# Patient Record
Sex: MALE | Race: WHITE | HISPANIC OR LATINO | Employment: FULL TIME | URBAN - METROPOLITAN AREA
[De-identification: names, ages, dates, MRNs, and addresses within clinical notes are randomized per-mention and may not be internally consistent; named-entity substitution may affect disease eponyms.]

---

## 2020-08-11 ENCOUNTER — HOSPITAL ENCOUNTER (EMERGENCY)
Facility: MEDICAL CENTER | Age: 45
End: 2020-08-12
Attending: EMERGENCY MEDICINE
Payer: OTHER MISCELLANEOUS

## 2020-08-11 DIAGNOSIS — K42.9 UMBILICAL HERNIA WITHOUT OBSTRUCTION AND WITHOUT GANGRENE: ICD-10-CM

## 2020-08-11 NOTE — LETTER
"  FORM C-4:  EMPLOYEE’S CLAIM FOR COMPENSATION/ REPORT OF INITIAL TREATMENT  EMPLOYEE’S CLAIM - PROVIDE ALL INFORMATION REQUESTED   First Name Herrera Last Name MelgarMorena Cerda Birthdate 1975  Sex male Claim Number   Home Employee Address [address]                                     Zip  [address] Height  1.651 m (5' 5\") Weight  77.1 kg (170 lb) [facility]     Mailing Employee Address [address]               Zip  [address] Telephone  [phone] (home)  Primary Language Spoken  English   Insurer   Third Party   [employer] Employee's Occupation (Job Title) When Injury or Occupational Disease Occurred     Employer's Name [employer] Telephone N/A    Employer Address [address] [place] [address]   Date of Injury  8/10/2020       Hour of Injury  5:15 PM Date Employer Notified  8/11/2020 Last Day of Work after Injury or Occupational Disease  8/11/2020 Supervisor to Whom Injury Reported  Alvaro PRÉEZ   Address or Location of Accident (if applicable) [[address] [place] [address]]   What were you doing at the time of accident? (if applicable) loading a container onto an alter elevator    How did this injury or occupational disease occur? Be specific and answer in detail. Use additional sheet if necessary)  I was loading a container onto an alter elevator when the lanyard on the container ripped  and caused me to fall backwards. I gurt my left shoulder and noticed pain and a bump  by my belly button   If you believe that you have an occupational disease, when did you first have knowledge of the disability and it relationship to your employment? N/A Witnesses to the Accident  2 leads (Jerel/Moises) A few coworkers   Nature of Injury or Occupational Disease  Workers' Compensation Part(s) of Body Injured or Affected  Shoulder (L), Abdomen Including Groin, N/A    I CERTIFY THAT THE ABOVE IS TRUE AND CORRECT TO THE BEST OF MY KNOWLEDGE AND " THAT I HAVE PROVIDED THIS INFORMATION IN ORDER TO OBTAIN THE BENEFITS OF [place]).  I HEREBY AUTHORIZE ANY PHYSICIAN, CHIROPRACTOR, SURGEON, PRACTITIONER, OR OTHER PERSON, ANY HOSPITAL, INCLUDING [facility] OR [facility], ANY MEDICAL SERVICE ORGANIZATION, ANY INSURANCE COMPANY, OR OTHER INSTITUTION OR ORGANIZATION TO RELEASE TO EACH OTHER, ANY MEDICAL OR OTHER INFORMATION, INCLUDING BENEFITS PAID OR PAYABLE, PERTINENT TO THIS INJURY OR DISEASE, EXCEPT INFORMATION RELATIVE TO DIAGNOSIS, TREATMENT AND/OR COUNSELING FOR AIDS, PSYCHOLOGICAL CONDITIONS, ALCOHOL OR CONTROLLED SUBSTANCES, FOR WHICH I MUST GIVE SPECIFIC AUTHORIZATION.  A PHOTOSTAT OF THIS AUTHORIZATION SHALL BE AS VALID AS THE ORIGINAL.  Date 08-      [facility]     Employee’s Signature   THIS REPORT MUST BE COMPLETED AND MAILED WITHIN 3 WORKING DAYS OF TREATMENT   Place [facility], EMERGENCY DEPT                                                                             Name of Facility [facility]   Date  8/11/2020 Diagnosis  (K42.9) Umbilical hernia without obstruction and without gangrene Is there evidence the injured employee was under the influence of alcohol and/or another controlled substance at the time of accident?   Hour  2:07 AM Description of Injury or Disease  Umbilical hernia without obstruction and without gangrene No   Treatment  Reduction of incarcerated umbilical hernia  Have you advised the patient to remain off work five days or more?         No   X-Ray Findings    If Yes   From Date    To Date      From information given by the employee, together with medical evidence, can you directly connect this injury or occupational disease as job incurred? Yes If No, is employee capable of: Full Duty  No Modified Duty  Yes   Is additional  "medical care by a physician indicated? Yes If Modified Duty, Specify any Limitations / Restrictions   No lifting greater than 5 pounds   Do you know of any previous injury or disease contributing to this condition or occupational disease? No    Date 8/12/2020 Print Doctor’s Name Chuy Curiel certify the employer’s copy of this form was mailed on:   Address [address] [place] [address]  [place] [id number] [phone] INSURER’S USE ONLY   Provider’s Tax ID Number [id number] Telephone Dept: [phone]    Doctor’s Signature e-CHUY Cronin M.D. Degree M.D.      Form C-4 (rev.10/07)                                                                         BRIEF DESCRIPTION OF RIGHTS AND BENEFITS  (Pursuant to [place] 616[place].050)    Notice of Injury or Occupational Disease (Incident Report Form C-1): If an injury or occupational disease (OD) arises out of and in the course of employment, you must provide written notice to your employer as soon as practicable, but no later than 7 days after the accident or OD. Your employer shall maintain a sufficient supply of the required forms.    Claim for Compensation (Form C-4): If medical treatment is sought, the form C-4 is available at the place of initial treatment. A completed \"Claim for Compensation\" (Form C-4) must be filed within 90 days after an accident or OD. The treating physician or chiropractor must, within 3 working days after treatment, complete and mail to the employer, the employer's insurer and third-party , the Claim for Compensation.    Medical Treatment: If you require medical treatment for your on-the-job injury or OD, you may be required to select a physician or chiropractor from a list provided by your workers’ compensation insurer, if it has contracted with an Organization for Managed Care (MCO) or Preferred Provider Organization (PPO) or providers of health care. If your employer has not entered into a contract with an MCO or PPO, you may " select a physician or chiropractor from the Panel of Physicians and Chiropractors. Any medical costs related to your industrial injury or OD will be paid by your insurer.    Temporary Total Disability (TTD): If your doctor has certified that you are unable to work for a period of at least 5 consecutive days, or 5 cumulative days in a 20-day period, or places restrictions on you that your employer does not accommodate, you may be entitled to TTD compensation.    Temporary Partial Disability (TPD): If the wage you receive upon reemployment is less than the compensation for TTD to which you are entitled, the insurer may be required to pay you TPD compensation to make up the difference. TPD can only be paid for a maximum of 24 months.    Permanent Partial Disability (PPD): When your medical condition is stable and there is an indication of a PPD as a result of your injury or OD, within 30 days, your insurer must arrange for an evaluation by a rating physician or chiropractor to determine the degree of your PPD. The amount of your PPD award depends on the date of injury, the results of the PPD evaluation and your age and wage.    Permanent Total Disability (PTD): If you are medically certified by a treating physician or chiropractor as permanently and totally disabled and have been granted a PTD status by your insurer, you are entitled to receive monthly benefits not to exceed 66 2/3% of your average monthly wage. The amount of your PTD payments is subject to reduction if you previously received a PPD award.    Vocational Rehabilitation Services: You may be eligible for vocational rehabilitation services if you are unable to return to the job due to a permanent physical impairment or permanent restrictions as a result of your injury or occupational disease.    Transportation and Per Andrei Reimbursement: You may be eligible for travel expenses and per andrei associated with medical treatment.    Reopening: You may be able to  reopen your claim if your condition worsens after claim closure.     Appeal Process: If you disagree with a written determination issued by the insurer or the insurer does not respond to your request, you may appeal to the Department of Administration, , by following the instructions contained in your determination letter. You must appeal the determination within 70 days from the date of the determination letter at [address], [address], [place], [place] [address], or [address], [address], [place], [place] [address]. If you disagree with the  decision, you may appeal to the Department of Administration, . You must file your appeal within 30 days from the date of the  decision letter at [address], [address], [place], [place] [address], or [address], [place], [place] [address]. If you disagree with a decision of an , you may file a petition for judicial review with the District Court. You must do so within 30 days of the Appeal Officer’s decision. You may be represented by an  at your own expense or you may contact the [facility] for possible representation.    [place]  for Injured Workers (NAIW): If you disagree with a  decision, you may request that NAIW represent you without charge at an  Hearing. For information regarding denial of benefits, you may contact the [facility] at: [address], [address], [place] [address], [phone], or [address], [place] [address], [phone]    To File a Complaint with the Division: If you wish to file a complaint with the  of the Division of Industrial Relations (DIR),  please contact the Workers’ Compensation Section, [address], [address], [place], [place] [address], telephone [phone], or [address], [address], [place], [place] [address],  telephone [phone].    For assistance with Workers’ Compensation Issues: You may contact the Office of the Governor Consumer Health Assistance, [address] [place] [address], [address], [place] [address], Toll Free 1-509.477.2899, Web site: http://FONU2.On license of [email or web address]., E-mail [email or web address].On license of [facility].[place].  D-2 (rev. 06/18)              __________________________________________________________________                                    08-            Employee Name / Signature                                                                                                                            Date

## 2020-08-12 VITALS
RESPIRATION RATE: 18 BRPM | DIASTOLIC BLOOD PRESSURE: 94 MMHG | HEIGHT: 65 IN | TEMPERATURE: 97.2 F | WEIGHT: 170 LBS | HEART RATE: 72 BPM | SYSTOLIC BLOOD PRESSURE: 155 MMHG | OXYGEN SATURATION: 94 % | BODY MASS INDEX: 28.32 KG/M2

## 2020-08-12 LAB
ALBUMIN SERPL BCP-MCNC: 4.4 G/DL (ref 3.2–4.9)
ALBUMIN/GLOB SERPL: 1.6 G/DL
ALP SERPL-CCNC: 88 U/L (ref 30–99)
ALT SERPL-CCNC: 25 U/L (ref 2–50)
ANION GAP SERPL CALC-SCNC: 14 MMOL/L (ref 7–16)
AST SERPL-CCNC: 22 U/L (ref 12–45)
BASOPHILS # BLD AUTO: 0.5 % (ref 0–1.8)
BASOPHILS # BLD: 0.05 K/UL (ref 0–0.12)
BILIRUB SERPL-MCNC: 0.2 MG/DL (ref 0.1–1.5)
BUN SERPL-MCNC: 12 MG/DL (ref 8–22)
CALCIUM SERPL-MCNC: 9 MG/DL (ref 8.5–10.5)
CHLORIDE SERPL-SCNC: 101 MMOL/L (ref 96–112)
CO2 SERPL-SCNC: 22 MMOL/L (ref 20–33)
CREAT SERPL-MCNC: 0.81 MG/DL (ref 0.5–1.4)
EOSINOPHIL # BLD AUTO: 0.23 K/UL (ref 0–0.51)
EOSINOPHIL NFR BLD: 2.4 % (ref 0–6.9)
ERYTHROCYTE [DISTWIDTH] IN BLOOD BY AUTOMATED COUNT: 44.3 FL (ref 35.9–50)
GLOBULIN SER CALC-MCNC: 2.8 G/DL (ref 1.9–3.5)
GLUCOSE SERPL-MCNC: 91 MG/DL (ref 65–99)
HCT VFR BLD AUTO: 43.8 % (ref 42–52)
HGB BLD-MCNC: 14.3 G/DL (ref 14–18)
IMM GRANULOCYTES # BLD AUTO: 0.02 K/UL (ref 0–0.11)
IMM GRANULOCYTES NFR BLD AUTO: 0.2 % (ref 0–0.9)
LYMPHOCYTES # BLD AUTO: 3.27 K/UL (ref 1–4.8)
LYMPHOCYTES NFR BLD: 34.1 % (ref 22–41)
MCH RBC QN AUTO: 27.9 PG (ref 27–33)
MCHC RBC AUTO-ENTMCNC: 32.6 G/DL (ref 33.7–35.3)
MCV RBC AUTO: 85.5 FL (ref 81.4–97.8)
MONOCYTES # BLD AUTO: 0.79 K/UL (ref 0–0.85)
MONOCYTES NFR BLD AUTO: 8.2 % (ref 0–13.4)
NEUTROPHILS # BLD AUTO: 5.24 K/UL (ref 1.82–7.42)
NEUTROPHILS NFR BLD: 54.6 % (ref 44–72)
NRBC # BLD AUTO: 0 K/UL
NRBC BLD-RTO: 0 /100 WBC
PLATELET # BLD AUTO: 308 K/UL (ref 164–446)
PMV BLD AUTO: 10.7 FL (ref 9–12.9)
POTASSIUM SERPL-SCNC: 3.6 MMOL/L (ref 3.6–5.5)
PROT SERPL-MCNC: 7.2 G/DL (ref 6–8.2)
RBC # BLD AUTO: 5.12 M/UL (ref 4.7–6.1)
SODIUM SERPL-SCNC: 137 MMOL/L (ref 135–145)
WBC # BLD AUTO: 9.6 K/UL (ref 4.8–10.8)

## 2020-08-12 PROCEDURE — 99284 EMERGENCY DEPT VISIT MOD MDM: CPT

## 2020-08-12 PROCEDURE — 85025 COMPLETE CBC W/AUTO DIFF WBC: CPT

## 2020-08-12 PROCEDURE — 700111 HCHG RX REV CODE 636 W/ 250 OVERRIDE (IP): Performed by: EMERGENCY MEDICINE

## 2020-08-12 PROCEDURE — 80053 COMPREHEN METABOLIC PANEL: CPT

## 2020-08-12 PROCEDURE — 96375 TX/PRO/DX INJ NEW DRUG ADDON: CPT

## 2020-08-12 PROCEDURE — 96374 THER/PROPH/DIAG INJ IV PUSH: CPT

## 2020-08-12 RX ORDER — MIDAZOLAM HYDROCHLORIDE 1 MG/ML
1 INJECTION INTRAMUSCULAR; INTRAVENOUS ONCE
Status: COMPLETED | OUTPATIENT
Start: 2020-08-12 | End: 2020-08-12

## 2020-08-12 RX ADMIN — MIDAZOLAM HYDROCHLORIDE 1 MG: 1 INJECTION, SOLUTION INTRAMUSCULAR; INTRAVENOUS at 00:38

## 2020-08-12 RX ADMIN — FENTANYL CITRATE 100 MCG: 50 INJECTION INTRAMUSCULAR; INTRAVENOUS at 00:38

## 2020-08-12 NOTE — ED TRIAGE NOTES
"Pt feel from standing yest at work, today c/o abd pain and a \"bump by his belly button\", denies n/v, denies HA, denies LOC   "

## 2020-08-12 NOTE — ED PROVIDER NOTES
ED Provider Note    CHIEF COMPLAINT  Chief Complaint   Patient presents with   • Fall   • Work-Related Injury       HPI  Herrera Barros is a 44 y.o. male who presents with abdominal pain.  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  He states he was pulling some cargo and what he is pulling on broke.  He had significant left shoulder pain and since that time he said periumbilical abdominal pain.  He states the pain is worse with any Valsalva type maneuver.  He does not have any associated vomiting.  He has not had any fevers.  He denies change in bowel or bladder function.  He states the pain is worse with pressure in the umbilical region.  States the pain is moderate in intensity.    REVIEW OF SYSTEMS  See HPI for further details. All other systems are negative.     PAST MEDICAL HISTORY  No past medical history on file.    FAMILY HISTORY  [unfilled]    SOCIAL HISTORY  Social History     Socioeconomic History   • Marital status: Single     Spouse name: Not on file   • Number of children: Not on file   • Years of education: Not on file   • Highest education level: Not on file   Occupational History   • Not on file   Social Needs   • Financial resource strain: Not on file   • Food insecurity     Worry: Not on file     Inability: Not on file   • Transportation needs     Medical: Not on file     Non-medical: Not on file   Tobacco Use   • Smoking status: Never Smoker   Substance and Sexual Activity   • Alcohol use: Yes     Comment: occ   • Drug use: No   • Sexual activity: Not on file   Lifestyle   • Physical activity     Days per week: Not on file     Minutes per session: Not on file   • Stress: Not on file   Relationships   • Social connections     Talks on phone: Not on file     Gets together: Not on file     Attends Hinduism service: Not on file     Active member of club or organization: Not on file     Attends meetings of clubs or organizations: Not on file     Relationship  "status: Not on file   • Intimate partner violence     Fear of current or ex partner: Not on file     Emotionally abused: Not on file     Physically abused: Not on file     Forced sexual activity: Not on file   Other Topics Concern   • Not on file   Social History Narrative   • Not on file       SURGICAL HISTORY  No past surgical history on file.    CURRENT MEDICATIONS  Home Medications     Reviewed by Sonal Barriga R.N. (Registered Nurse) on 08/11/20 at 2212  Med List Status: Not Addressed   Medication Last Dose Status        Patient Compa Taking any Medications                       ALLERGIES  No Known Allergies    PHYSICAL EXAM  VITAL SIGNS: BP (!) 169/93   Pulse 90   Temp 36 °C (96.8 °F) (Oral)   Resp 14   Ht 1.651 m (5' 5\")   Wt 77.1 kg (170 lb)   SpO2 97%   BMI 28.29 kg/m²       Constitutional: Well developed, Well nourished, No acute distress, Non-toxic appearance.   HENT: Normocephalic, Atraumatic, Bilateral external ears normal, Oropharynx moist, No oral exudates, Nose normal.   Eyes: PERRLA, EOMI, Conjunctiva normal, No discharge.   Neck: Normal range of motion, No tenderness, Supple, No stridor.   Lymphatic: No lymphadenopathy noted.   Cardiovascular: Normal heart rate, Normal rhythm, No murmurs, No rubs, No gallops.   Thorax & Lungs: Normal breath sounds, No respiratory distress, No wheezing, No chest tenderness.   Abdomen: Incarcerated umbilical hernia, otherwise mild diffuse tenderness, no distention, normal bowel sounds  Skin: Warm, Dry, No erythema, No rash.   Back: No tenderness, No CVA tenderness.   Extremities: Left anterior and lateral shoulder discomfort without obvious deformities.  Full range of motion to the left upper extremity.  Extremities otherwise intact distal pulses, No edema, No tenderness, No cyanosis, No clubbing.   Neurologic: GCS of 15   psychiatric: Affect normal, Judgment normal, Mood normal.     COURSE & MEDICAL DECISION MAKING  Pertinent Labs & Imaging studies reviewed. " (See chart for details)  This is a 44-year-old male who presents the emergency department with an incarcerated umbilical hernia.  I was able to reduce the hernia after administering Versed and fentanyl intravenously.  Subsequently the patient has been observed and has been asymptomatic since that time.  Laboratory analysis is reassuring.  The patient will be discharged with instructions to follow-up with the general surgeon for outpatient surgical intervention.  If he is unable to reduce the hernia manually he will return for repeat examination.  He is instructed to not lift greater than 5 pounds.  As for the left shoulder injury suspect this is more of a strain.  This should respond to anti-inflammatories.    Procedure umbilical hernia reduction  The patient received a milligram of Versed and 100 mcg of fentanyl.  I was able to reduce the umbilical hernia without difficulty.    FINAL IMPRESSION  1.  Incarcerated umbilical hernia  2.  Umbilical hernia reduction  3.  Left shoulder strain    Disposition  The patient will be discharged in stable condition         Electronically signed by: Chuy Curiel M.D., 8/12/2020 12:12 AM

## 2020-09-17 ENCOUNTER — HOSPITAL ENCOUNTER (OUTPATIENT)
Facility: MEDICAL CENTER | Age: 45
End: 2020-09-17
Attending: SURGERY | Admitting: SURGERY
Payer: COMMERCIAL

## 2020-09-25 ENCOUNTER — PRE-ADMISSION TESTING (OUTPATIENT)
Dept: ADMISSIONS | Facility: MEDICAL CENTER | Age: 45
End: 2020-09-25
Attending: SURGERY
Payer: COMMERCIAL

## 2020-09-25 VITALS — BODY MASS INDEX: 32.67 KG/M2 | WEIGHT: 203.26 LBS | HEIGHT: 66 IN

## 2020-09-25 DIAGNOSIS — Z01.812 PRE-OPERATIVE LABORATORY EXAMINATION: ICD-10-CM

## 2020-09-25 LAB
ANION GAP SERPL CALC-SCNC: 12 MMOL/L (ref 7–16)
BUN SERPL-MCNC: 19 MG/DL (ref 8–22)
CALCIUM SERPL-MCNC: 9.4 MG/DL (ref 8.5–10.5)
CHLORIDE SERPL-SCNC: 101 MMOL/L (ref 96–112)
CO2 SERPL-SCNC: 22 MMOL/L (ref 20–33)
COVID ORDER STATUS COVID19: NORMAL
CREAT SERPL-MCNC: 0.95 MG/DL (ref 0.5–1.4)
GLUCOSE SERPL-MCNC: 89 MG/DL (ref 65–99)
POTASSIUM SERPL-SCNC: 4.8 MMOL/L (ref 3.6–5.5)
SARS-COV-2 RNA RESP QL NAA+PROBE: NOTDETECTED
SODIUM SERPL-SCNC: 135 MMOL/L (ref 135–145)
SPECIMEN SOURCE: NORMAL

## 2020-09-25 PROCEDURE — 36415 COLL VENOUS BLD VENIPUNCTURE: CPT

## 2020-09-25 PROCEDURE — C9803 HOPD COVID-19 SPEC COLLECT: HCPCS

## 2020-09-25 PROCEDURE — U0003 INFECTIOUS AGENT DETECTION BY NUCLEIC ACID (DNA OR RNA); SEVERE ACUTE RESPIRATORY SYNDROME CORONAVIRUS 2 (SARS-COV-2) (CORONAVIRUS DISEASE [COVID-19]), AMPLIFIED PROBE TECHNIQUE, MAKING USE OF HIGH THROUGHPUT TECHNOLOGIES AS DESCRIBED BY CMS-2020-01-R: HCPCS

## 2020-09-25 PROCEDURE — 80048 BASIC METABOLIC PNL TOTAL CA: CPT

## 2020-09-25 RX ORDER — M-VIT,TX,IRON,MINS/CALC/FOLIC 27MG-0.4MG
1 TABLET ORAL DAILY
Status: ON HOLD | COMMUNITY
End: 2020-11-03

## 2020-09-25 RX ORDER — LOSARTAN POTASSIUM 50 MG/1
50 TABLET ORAL DAILY
Status: ON HOLD | COMMUNITY
End: 2021-02-01

## 2020-09-25 SDOH — HEALTH STABILITY: MENTAL HEALTH: HOW OFTEN DO YOU HAVE A DRINK CONTAINING ALCOHOL?: 4 OR MORE TIMES A WEEK

## 2020-09-25 ASSESSMENT — FIBROSIS 4 INDEX: FIB4 SCORE: 0.66

## 2020-09-28 ENCOUNTER — APPOINTMENT (OUTPATIENT)
Dept: RADIOLOGY | Facility: MEDICAL CENTER | Age: 45
End: 2020-09-28
Attending: EMERGENCY MEDICINE
Payer: OTHER MISCELLANEOUS

## 2020-09-28 ENCOUNTER — HOSPITAL ENCOUNTER (EMERGENCY)
Facility: MEDICAL CENTER | Age: 45
End: 2020-09-28
Attending: EMERGENCY MEDICINE
Payer: OTHER MISCELLANEOUS

## 2020-09-28 VITALS
WEIGHT: 202.82 LBS | DIASTOLIC BLOOD PRESSURE: 108 MMHG | TEMPERATURE: 97.4 F | OXYGEN SATURATION: 96 % | BODY MASS INDEX: 32.6 KG/M2 | SYSTOLIC BLOOD PRESSURE: 176 MMHG | RESPIRATION RATE: 18 BRPM | HEIGHT: 66 IN | HEART RATE: 57 BPM

## 2020-09-28 DIAGNOSIS — N20.0 KIDNEY STONE: ICD-10-CM

## 2020-09-28 LAB
ALBUMIN SERPL BCP-MCNC: 4.3 G/DL (ref 3.2–4.9)
ALBUMIN/GLOB SERPL: 1.3 G/DL
ALP SERPL-CCNC: 92 U/L (ref 30–99)
ALT SERPL-CCNC: 26 U/L (ref 2–50)
ANION GAP SERPL CALC-SCNC: 12 MMOL/L (ref 7–16)
APPEARANCE UR: CLEAR
AST SERPL-CCNC: 24 U/L (ref 12–45)
BACTERIA #/AREA URNS HPF: ABNORMAL /HPF
BASOPHILS # BLD AUTO: 0.4 % (ref 0–1.8)
BASOPHILS # BLD: 0.06 K/UL (ref 0–0.12)
BILIRUB SERPL-MCNC: 0.2 MG/DL (ref 0.1–1.5)
BILIRUB UR QL STRIP.AUTO: NEGATIVE
BUN SERPL-MCNC: 16 MG/DL (ref 8–22)
CALCIUM SERPL-MCNC: 9.2 MG/DL (ref 8.4–10.2)
CHLORIDE SERPL-SCNC: 105 MMOL/L (ref 96–112)
CO2 SERPL-SCNC: 25 MMOL/L (ref 20–33)
COLOR UR: YELLOW
CREAT SERPL-MCNC: 0.9 MG/DL (ref 0.5–1.4)
EOSINOPHIL # BLD AUTO: 0.05 K/UL (ref 0–0.51)
EOSINOPHIL NFR BLD: 0.4 % (ref 0–6.9)
ERYTHROCYTE [DISTWIDTH] IN BLOOD BY AUTOMATED COUNT: 42.8 FL (ref 35.9–50)
GLOBULIN SER CALC-MCNC: 3.3 G/DL (ref 1.9–3.5)
GLUCOSE SERPL-MCNC: 108 MG/DL (ref 65–99)
GLUCOSE UR STRIP.AUTO-MCNC: NEGATIVE MG/DL
HCT VFR BLD AUTO: 43.9 % (ref 42–52)
HGB BLD-MCNC: 14.5 G/DL (ref 14–18)
IMM GRANULOCYTES # BLD AUTO: 0.06 K/UL (ref 0–0.11)
IMM GRANULOCYTES NFR BLD AUTO: 0.4 % (ref 0–0.9)
KETONES UR STRIP.AUTO-MCNC: NEGATIVE MG/DL
LEUKOCYTE ESTERASE UR QL STRIP.AUTO: NEGATIVE
LIPASE SERPL-CCNC: 32 U/L (ref 7–58)
LYMPHOCYTES # BLD AUTO: 2.31 K/UL (ref 1–4.8)
LYMPHOCYTES NFR BLD: 16.3 % (ref 22–41)
MCH RBC QN AUTO: 27.5 PG (ref 27–33)
MCHC RBC AUTO-ENTMCNC: 33 G/DL (ref 33.7–35.3)
MCV RBC AUTO: 83.1 FL (ref 81.4–97.8)
MICRO URNS: ABNORMAL
MONOCYTES # BLD AUTO: 0.68 K/UL (ref 0–0.85)
MONOCYTES NFR BLD AUTO: 4.8 % (ref 0–13.4)
MUCOUS THREADS #/AREA URNS HPF: ABNORMAL /HPF
NEUTROPHILS # BLD AUTO: 11.03 K/UL (ref 1.82–7.42)
NEUTROPHILS NFR BLD: 77.7 % (ref 44–72)
NITRITE UR QL STRIP.AUTO: NEGATIVE
NRBC # BLD AUTO: 0 K/UL
NRBC BLD-RTO: 0 /100 WBC
PH UR STRIP.AUTO: 6.5 [PH] (ref 5–8)
PLATELET # BLD AUTO: 361 K/UL (ref 164–446)
PMV BLD AUTO: 10.2 FL (ref 9–12.9)
POTASSIUM SERPL-SCNC: 4.2 MMOL/L (ref 3.6–5.5)
PROT SERPL-MCNC: 7.6 G/DL (ref 6–8.2)
PROT UR QL STRIP: NEGATIVE MG/DL
RBC # BLD AUTO: 5.28 M/UL (ref 4.7–6.1)
RBC # URNS HPF: ABNORMAL /HPF
RBC UR QL AUTO: ABNORMAL
SODIUM SERPL-SCNC: 142 MMOL/L (ref 135–145)
SP GR UR STRIP.AUTO: 1.01
WBC # BLD AUTO: 14.2 K/UL (ref 4.8–10.8)
WBC #/AREA URNS HPF: ABNORMAL /HPF

## 2020-09-28 PROCEDURE — 74177 CT ABD & PELVIS W/CONTRAST: CPT

## 2020-09-28 PROCEDURE — 700105 HCHG RX REV CODE 258: Performed by: EMERGENCY MEDICINE

## 2020-09-28 PROCEDURE — 700117 HCHG RX CONTRAST REV CODE 255: Performed by: EMERGENCY MEDICINE

## 2020-09-28 PROCEDURE — 96365 THER/PROPH/DIAG IV INF INIT: CPT

## 2020-09-28 PROCEDURE — 700102 HCHG RX REV CODE 250 W/ 637 OVERRIDE(OP): Performed by: EMERGENCY MEDICINE

## 2020-09-28 PROCEDURE — 87086 URINE CULTURE/COLONY COUNT: CPT

## 2020-09-28 PROCEDURE — A9270 NON-COVERED ITEM OR SERVICE: HCPCS | Performed by: EMERGENCY MEDICINE

## 2020-09-28 PROCEDURE — 99284 EMERGENCY DEPT VISIT MOD MDM: CPT

## 2020-09-28 PROCEDURE — 81001 URINALYSIS AUTO W/SCOPE: CPT

## 2020-09-28 PROCEDURE — 85025 COMPLETE CBC W/AUTO DIFF WBC: CPT

## 2020-09-28 PROCEDURE — 83690 ASSAY OF LIPASE: CPT

## 2020-09-28 PROCEDURE — 700111 HCHG RX REV CODE 636 W/ 250 OVERRIDE (IP): Performed by: EMERGENCY MEDICINE

## 2020-09-28 PROCEDURE — 96375 TX/PRO/DX INJ NEW DRUG ADDON: CPT

## 2020-09-28 PROCEDURE — 80053 COMPREHEN METABOLIC PANEL: CPT

## 2020-09-28 RX ORDER — KETOROLAC TROMETHAMINE 30 MG/ML
15 INJECTION, SOLUTION INTRAMUSCULAR; INTRAVENOUS ONCE
Status: COMPLETED | OUTPATIENT
Start: 2020-09-28 | End: 2020-09-28

## 2020-09-28 RX ORDER — TAMSULOSIN HYDROCHLORIDE 0.4 MG/1
0.4 CAPSULE ORAL DAILY
Qty: 10 CAP | Refills: 0 | Status: ON HOLD | OUTPATIENT
Start: 2020-09-28 | End: 2021-02-01

## 2020-09-28 RX ORDER — TAMSULOSIN HYDROCHLORIDE 0.4 MG/1
0.4 CAPSULE ORAL ONCE
Status: COMPLETED | OUTPATIENT
Start: 2020-09-28 | End: 2020-09-28

## 2020-09-28 RX ORDER — HYDROCODONE BITARTRATE AND ACETAMINOPHEN 5; 325 MG/1; MG/1
1-2 TABLET ORAL EVERY 4 HOURS PRN
Qty: 15 TAB | Refills: 0 | Status: SHIPPED | OUTPATIENT
Start: 2020-09-28 | End: 2020-10-01

## 2020-09-28 RX ORDER — HYDROCODONE BITARTRATE AND ACETAMINOPHEN 5; 325 MG/1; MG/1
2 TABLET ORAL ONCE
Status: COMPLETED | OUTPATIENT
Start: 2020-09-28 | End: 2020-09-28

## 2020-09-28 RX ORDER — ONDANSETRON 4 MG/1
4 TABLET, ORALLY DISINTEGRATING ORAL EVERY 6 HOURS PRN
Qty: 10 TAB | Refills: 0 | Status: ON HOLD | OUTPATIENT
Start: 2020-09-28 | End: 2021-02-01

## 2020-09-28 RX ORDER — SULFAMETHOXAZOLE AND TRIMETHOPRIM 800; 160 MG/1; MG/1
1 TABLET ORAL 2 TIMES DAILY
Qty: 14 TAB | Refills: 0 | Status: SHIPPED | OUTPATIENT
Start: 2020-09-28 | End: 2020-10-05

## 2020-09-28 RX ADMIN — KETOROLAC TROMETHAMINE 15 MG: 30 INJECTION, SOLUTION INTRAMUSCULAR at 21:15

## 2020-09-28 RX ADMIN — TAMSULOSIN HYDROCHLORIDE 0.4 MG: 0.4 CAPSULE ORAL at 20:58

## 2020-09-28 RX ADMIN — CEFTRIAXONE SODIUM 2 G: 2 INJECTION, POWDER, FOR SOLUTION INTRAMUSCULAR; INTRAVENOUS at 22:20

## 2020-09-28 RX ADMIN — HYDROCODONE BITARTRATE AND ACETAMINOPHEN 2 TABLET: 5; 325 TABLET ORAL at 22:12

## 2020-09-28 RX ADMIN — IOHEXOL 100 ML: 350 INJECTION, SOLUTION INTRAVENOUS at 20:27

## 2020-09-28 ASSESSMENT — FIBROSIS 4 INDEX: FIB4 SCORE: 0.66

## 2020-09-29 RX ORDER — SODIUM CHLORIDE, SODIUM LACTATE, POTASSIUM CHLORIDE, CALCIUM CHLORIDE 600; 310; 30; 20 MG/100ML; MG/100ML; MG/100ML; MG/100ML
INJECTION, SOLUTION INTRAVENOUS CONTINUOUS
Status: CANCELLED | OUTPATIENT
Start: 2020-09-29 | End: 2020-09-29

## 2020-09-29 NOTE — ED TRIAGE NOTES
"Presents with a hx of umbilical hernia diagnosed this past August, scheduled for surgery tomorrow. He C/O exacerbating pain.   Chief Complaint   Patient presents with   • Hernia     /100   Pulse 78   Temp 36.3 °C (97.4 °F) (Temporal)   Resp 18   Ht 1.676 m (5' 6\")   Wt 92 kg (202 lb 13.2 oz)   SpO2 96%   BMI 32.74 kg/m²     "

## 2020-09-29 NOTE — ED NOTES
Spoke with ERP about Toradol as pt is due for surgery on his hernia tomorrow. Per ERP given Flomax and hold Toradol at this time.    Pt aware of plan and states that at this time he does not need pain medication.

## 2020-09-29 NOTE — ED PROVIDER NOTES
ED Provider Note    CHIEF COMPLAINT  Chief Complaint   Patient presents with   • Hernia       HPI  Herrera Barros is a 44 y.o. male who presents for evaluation of crampy left lower quadrant abdominal pain.  The patient apparently is scheduled for elective repair of an umbilical hernia tomorrow with Dr. Hall.  This was scheduled weeks ago.  He denies any sharp pain or discomfort or swelling around his umbilicus.  He reports dull crampy intermittent left lower quadrant pain however.  He denies hematochezia melena or hemoptysis.  No history of kidney stones no reported flank pain dysuria or hematuria.  Pain started earlier today.  It does not radiate.  REVIEW OF SYSTEMS  See HPI for further details.  No high fevers chills night sweats weight loss all other systems are negative.     PAST MEDICAL HISTORY  Past Medical History:   Diagnosis Date   • Hypertension        FAMILY HISTORY  Noncontributory    SOCIAL HISTORY  Social History     Socioeconomic History   • Marital status: Single     Spouse name: Not on file   • Number of children: Not on file   • Years of education: Not on file   • Highest education level: Not on file   Occupational History   • Not on file   Social Needs   • Financial resource strain: Not on file   • Food insecurity     Worry: Not on file     Inability: Not on file   • Transportation needs     Medical: Not on file     Non-medical: Not on file   Tobacco Use   • Smoking status: Never Smoker   • Smokeless tobacco: Never Used   Substance and Sexual Activity   • Alcohol use: Yes     Frequency: 4 or more times a week     Comment: Occasionally   • Drug use: No   • Sexual activity: Not on file   Lifestyle   • Physical activity     Days per week: Not on file     Minutes per session: Not on file   • Stress: Not on file   Relationships   • Social connections     Talks on phone: Not on file     Gets together: Not on file     Attends Christian service: Not on file     Active member of club or  "organization: Not on file     Attends meetings of clubs or organizations: Not on file     Relationship status: Not on file   • Intimate partner violence     Fear of current or ex partner: Not on file     Emotionally abused: Not on file     Physically abused: Not on file     Forced sexual activity: Not on file   Other Topics Concern   • Not on file   Social History Narrative   • Not on file     Denies IV drugs  SURGICAL HISTORY  Past Surgical History:   Procedure Laterality Date   • OTHER ORTHOPEDIC SURGERY Left 2001    knee tendon       CURRENT MEDICATIONS  No current facility-administered medications for this encounter.     Current Outpatient Medications:   •  losartan (COZAAR) 50 MG Tab, Take 50 mg by mouth every day., Disp: , Rfl:   •  therapeutic multivitamin-minerals (THERAGRAN-M) Tab, Take 1 Tab by mouth every day., Disp: , Rfl:       ALLERGIES  No Known Allergies    PHYSICAL EXAM  VITAL SIGNS: /100   Pulse 78   Temp 36.3 °C (97.4 °F) (Temporal)   Resp 18   Ht 1.676 m (5' 6\")   Wt 92 kg (202 lb 13.2 oz)   SpO2 96%   BMI 32.74 kg/m²       Constitutional: Well developed, Well nourished, No acute distress, Non-toxic appearance.   HENT: Normocephalic, Atraumatic, Bilateral external ears normal, Oropharynx moist, No oral exudates, Nose normal.   Eyes: PERRLA, EOMI, Conjunctiva normal, No discharge.   Neck: Normal range of motion, No tenderness, Supple, No stridor.   Cardiovascular: Normal heart rate, Normal rhythm, No murmurs, No rubs, No gallops.   Thorax & Lungs: Normal breath sounds, No respiratory distress, No wheezing, No chest tenderness.   Abdomen: Bowel sounds normal, patient has a palpable umbilical hernia that is nontender and easily reducible, he does however have reproducible tenderness in the left lower quadrant with localized guarding he also has some right lower quadrant tenderness as well  Skin: Warm, Dry, No erythema, No rash.   Back: No tenderness, No CVA tenderness.   Extremities: " Intact distal pulses, No edema, No tenderness, No cyanosis, No clubbing.   Neurologic: Alert & oriented x 3, Normal motor function, Normal sensory function, No focal deficits noted.   Psychiatric: Anxious    COURSE & MEDICAL DECISION MAKING  Pertinent Labs & Imaging studies reviewed. (See chart for details)  CT-ABDOMEN-PELVIS WITH   Final Result         1.  7.2 mm distal right ureteral stone results in left urinary outflow tract obstructive changes and changes of obstructive nephropathy.   2.  Right nephrolithiasis without visualized obstructive changes.   3.  Small fat-containing inguinal hernia   4.  Hepatomegaly        Results for orders placed or performed during the hospital encounter of 09/28/20   URINALYSIS    Specimen: Urine   Result Value Ref Range    Color Yellow     Character Clear     Specific Gravity 1.015 <1.035    Ph 6.5 5.0 - 8.0    Glucose Negative Negative mg/dL    Ketones Negative Negative mg/dL    Protein Negative Negative mg/dL    Bilirubin Negative Negative    Nitrite Negative Negative    Leukocyte Esterase Negative Negative    Occult Blood Small (A) Negative    Micro Urine Req Microscopic    CBC WITH DIFFERENTIAL   Result Value Ref Range    WBC 14.2 (H) 4.8 - 10.8 K/uL    RBC 5.28 4.70 - 6.10 M/uL    Hemoglobin 14.5 14.0 - 18.0 g/dL    Hematocrit 43.9 42.0 - 52.0 %    MCV 83.1 81.4 - 97.8 fL    MCH 27.5 27.0 - 33.0 pg    MCHC 33.0 (L) 33.7 - 35.3 g/dL    RDW 42.8 35.9 - 50.0 fL    Platelet Count 361 164 - 446 K/uL    MPV 10.2 9.0 - 12.9 fL    Neutrophils-Polys 77.70 (H) 44.00 - 72.00 %    Lymphocytes 16.30 (L) 22.00 - 41.00 %    Monocytes 4.80 0.00 - 13.40 %    Eosinophils 0.40 0.00 - 6.90 %    Basophils 0.40 0.00 - 1.80 %    Immature Granulocytes 0.40 0.00 - 0.90 %    Nucleated RBC 0.00 /100 WBC    Neutrophils (Absolute) 11.03 (H) 1.82 - 7.42 K/uL    Lymphs (Absolute) 2.31 1.00 - 4.80 K/uL    Monos (Absolute) 0.68 0.00 - 0.85 K/uL    Eos (Absolute) 0.05 0.00 - 0.51 K/uL    Baso (Absolute)  0.06 0.00 - 0.12 K/uL    Immature Granulocytes (abs) 0.06 0.00 - 0.11 K/uL    NRBC (Absolute) 0.00 K/uL   Comp Metabolic Panel   Result Value Ref Range    Sodium 142 135 - 145 mmol/L    Potassium 4.2 3.6 - 5.5 mmol/L    Chloride 105 96 - 112 mmol/L    Co2 25 20 - 33 mmol/L    Anion Gap 12.0 7.0 - 16.0    Glucose 108 (H) 65 - 99 mg/dL    Bun 16 8 - 22 mg/dL    Creatinine 0.90 0.50 - 1.40 mg/dL    Calcium 9.2 8.4 - 10.2 mg/dL    AST(SGOT) 24 12 - 45 U/L    ALT(SGPT) 26 2 - 50 U/L    Alkaline Phosphatase 92 30 - 99 U/L    Total Bilirubin 0.2 0.1 - 1.5 mg/dL    Albumin 4.3 3.2 - 4.9 g/dL    Total Protein 7.6 6.0 - 8.2 g/dL    Globulin 3.3 1.9 - 3.5 g/dL    A-G Ratio 1.3 g/dL   LIPASE   Result Value Ref Range    Lipase 32 7 - 58 U/L   URINE MICROSCOPIC (W/UA)   Result Value Ref Range    WBC 0-2 (A) /hpf    RBC 0-2 (A) /hpf    Bacteria Rare (A) None /hpf    Mucous Threads Few /hpf   ESTIMATED GFR   Result Value Ref Range    GFR If African American >60 >60 mL/min/1.73 m 2    GFR If Non African American >60 >60 mL/min/1.73 m 2      An IV was established.  Patient did not have severe pain.  I reviewed his history.  He is apparently scheduled for an umbilical hernia repair electively tomorrow with Dr. Hall.  His pain here does not seem to originate from his umbilicus.  He has some crampy both left and lower abdominal pain.  Therefore CT scan was performed.  He does apparently have a rather large distal 7 mm right-sided stone, with oddly some obstructive uropathy radiological signs on the left.  I reviewed this case with Dr. Villgeas urology.  He said sometimes you can see that or could be incidental finding or not germane.  He does agree that this is quite a large stone.  The patient does not have pyuria or obvious urinary tract infection.  He has mild leukocytosis which is likely stress response.  We will culture his urine given dose of Rocephin.  I reviewed the issue with Dr. Hall who indicated that he will likely cancel the  elective umbilical hernia repair until the kidney stone can get sorted out.  Urgent referral to urology tomorrow.  I will place him on Bactrim, Norco and Zofran and advised him to drink plenty of fluids    FINAL IMPRESSION  1.  Right-sided 7 mm distal ureteral kidney stone      Electronically signed by: Baljinder Logan M.D., 9/28/2020 6:32 PM

## 2020-09-29 NOTE — ED NOTES
ABX completed. Pt educated on discharge instructions and prescriptions. Pt declines any questions at this time. Pt ambulated out of ER.

## 2020-09-29 NOTE — DISCHARGE INSTRUCTIONS
Drink plenty of clear fluids.  Return immediately for worsening pain fevers or chills.  Follow-up with urology within 1 to 2 days as discussed

## 2020-10-01 LAB
BACTERIA UR CULT: NORMAL
SIGNIFICANT IND 70042: NORMAL
SITE SITE: NORMAL
SOURCE SOURCE: NORMAL

## 2020-10-11 ENCOUNTER — HOSPITAL ENCOUNTER (OUTPATIENT)
Facility: MEDICAL CENTER | Age: 45
End: 2020-10-12
Attending: EMERGENCY MEDICINE | Admitting: HOSPITALIST
Payer: OTHER MISCELLANEOUS

## 2020-10-11 ENCOUNTER — APPOINTMENT (OUTPATIENT)
Dept: RADIOLOGY | Facility: MEDICAL CENTER | Age: 45
End: 2020-10-11
Attending: EMERGENCY MEDICINE
Payer: OTHER MISCELLANEOUS

## 2020-10-11 DIAGNOSIS — N23 RENAL COLIC ON LEFT SIDE: ICD-10-CM

## 2020-10-11 DIAGNOSIS — N20.0 NEPHROLITHIASIS: Primary | ICD-10-CM

## 2020-10-11 DIAGNOSIS — E86.0 DEHYDRATION: ICD-10-CM

## 2020-10-11 DIAGNOSIS — N20.1 URETEROLITHIASIS: ICD-10-CM

## 2020-10-11 LAB
ALBUMIN SERPL BCP-MCNC: 4.5 G/DL (ref 3.2–4.9)
ALBUMIN/GLOB SERPL: 1.4 G/DL
ALP SERPL-CCNC: 95 U/L (ref 30–99)
ALT SERPL-CCNC: 25 U/L (ref 2–50)
ANION GAP SERPL CALC-SCNC: 14 MMOL/L (ref 7–16)
APPEARANCE UR: CLEAR
AST SERPL-CCNC: 24 U/L (ref 12–45)
BACTERIA #/AREA URNS HPF: ABNORMAL /HPF
BASOPHILS # BLD AUTO: 0.4 % (ref 0–1.8)
BASOPHILS # BLD: 0.07 K/UL (ref 0–0.12)
BILIRUB SERPL-MCNC: <0.2 MG/DL (ref 0.1–1.5)
BILIRUB UR QL STRIP.AUTO: NEGATIVE
BUN SERPL-MCNC: 17 MG/DL (ref 8–22)
CALCIUM SERPL-MCNC: 8.7 MG/DL (ref 8.4–10.2)
CHLORIDE SERPL-SCNC: 103 MMOL/L (ref 96–112)
CO2 SERPL-SCNC: 21 MMOL/L (ref 20–33)
COLOR UR: YELLOW
CREAT SERPL-MCNC: 1.16 MG/DL (ref 0.5–1.4)
EOSINOPHIL # BLD AUTO: 0.18 K/UL (ref 0–0.51)
EOSINOPHIL NFR BLD: 1 % (ref 0–6.9)
EPI CELLS #/AREA URNS HPF: NEGATIVE /HPF
ERYTHROCYTE [DISTWIDTH] IN BLOOD BY AUTOMATED COUNT: 44.3 FL (ref 35.9–50)
GLOBULIN SER CALC-MCNC: 3.2 G/DL (ref 1.9–3.5)
GLUCOSE SERPL-MCNC: 165 MG/DL (ref 65–99)
GLUCOSE UR STRIP.AUTO-MCNC: NEGATIVE MG/DL
HCT VFR BLD AUTO: 44 % (ref 42–52)
HGB BLD-MCNC: 14.5 G/DL (ref 14–18)
IMM GRANULOCYTES # BLD AUTO: 0.11 K/UL (ref 0–0.11)
IMM GRANULOCYTES NFR BLD AUTO: 0.6 % (ref 0–0.9)
KETONES UR STRIP.AUTO-MCNC: NEGATIVE MG/DL
LEUKOCYTE ESTERASE UR QL STRIP.AUTO: NEGATIVE
LIPASE SERPL-CCNC: 53 U/L (ref 7–58)
LYMPHOCYTES # BLD AUTO: 2.92 K/UL (ref 1–4.8)
LYMPHOCYTES NFR BLD: 17 % (ref 22–41)
MCH RBC QN AUTO: 27.7 PG (ref 27–33)
MCHC RBC AUTO-ENTMCNC: 33 G/DL (ref 33.7–35.3)
MCV RBC AUTO: 84.1 FL (ref 81.4–97.8)
MICRO URNS: ABNORMAL
MONOCYTES # BLD AUTO: 1.22 K/UL (ref 0–0.85)
MONOCYTES NFR BLD AUTO: 7.1 % (ref 0–13.4)
MUCOUS THREADS #/AREA URNS HPF: ABNORMAL /HPF
NEUTROPHILS # BLD AUTO: 12.67 K/UL (ref 1.82–7.42)
NEUTROPHILS NFR BLD: 73.9 % (ref 44–72)
NITRITE UR QL STRIP.AUTO: NEGATIVE
NRBC # BLD AUTO: 0 K/UL
NRBC BLD-RTO: 0 /100 WBC
PH UR STRIP.AUTO: 6 [PH] (ref 5–8)
PLATELET # BLD AUTO: 346 K/UL (ref 164–446)
PMV BLD AUTO: 10.5 FL (ref 9–12.9)
POTASSIUM SERPL-SCNC: 3.8 MMOL/L (ref 3.6–5.5)
PROT SERPL-MCNC: 7.7 G/DL (ref 6–8.2)
PROT UR QL STRIP: NEGATIVE MG/DL
RBC # BLD AUTO: 5.23 M/UL (ref 4.7–6.1)
RBC # URNS HPF: ABNORMAL /HPF
RBC UR QL AUTO: ABNORMAL
SODIUM SERPL-SCNC: 138 MMOL/L (ref 135–145)
SP GR UR STRIP.AUTO: 1.02
WBC # BLD AUTO: 17.2 K/UL (ref 4.8–10.8)
WBC #/AREA URNS HPF: ABNORMAL /HPF

## 2020-10-11 PROCEDURE — C9803 HOPD COVID-19 SPEC COLLECT: HCPCS | Performed by: EMERGENCY MEDICINE

## 2020-10-11 PROCEDURE — 36415 COLL VENOUS BLD VENIPUNCTURE: CPT

## 2020-10-11 PROCEDURE — 96375 TX/PRO/DX INJ NEW DRUG ADDON: CPT

## 2020-10-11 PROCEDURE — 83690 ASSAY OF LIPASE: CPT

## 2020-10-11 PROCEDURE — 74176 CT ABD & PELVIS W/O CONTRAST: CPT

## 2020-10-11 PROCEDURE — 700105 HCHG RX REV CODE 258: Performed by: EMERGENCY MEDICINE

## 2020-10-11 PROCEDURE — 700111 HCHG RX REV CODE 636 W/ 250 OVERRIDE (IP): Performed by: EMERGENCY MEDICINE

## 2020-10-11 PROCEDURE — 85025 COMPLETE CBC W/AUTO DIFF WBC: CPT

## 2020-10-11 PROCEDURE — 96374 THER/PROPH/DIAG INJ IV PUSH: CPT

## 2020-10-11 PROCEDURE — 80053 COMPREHEN METABOLIC PANEL: CPT

## 2020-10-11 PROCEDURE — 81001 URINALYSIS AUTO W/SCOPE: CPT

## 2020-10-11 PROCEDURE — 99285 EMERGENCY DEPT VISIT HI MDM: CPT

## 2020-10-11 RX ORDER — TAMSULOSIN HYDROCHLORIDE 0.4 MG/1
0.4 CAPSULE ORAL ONCE
Status: COMPLETED | OUTPATIENT
Start: 2020-10-12 | End: 2020-10-12

## 2020-10-11 RX ORDER — SODIUM CHLORIDE 9 MG/ML
1000 INJECTION, SOLUTION INTRAVENOUS ONCE
Status: COMPLETED | OUTPATIENT
Start: 2020-10-11 | End: 2020-10-11

## 2020-10-11 RX ORDER — KETOROLAC TROMETHAMINE 30 MG/ML
15 INJECTION, SOLUTION INTRAMUSCULAR; INTRAVENOUS ONCE
Status: COMPLETED | OUTPATIENT
Start: 2020-10-11 | End: 2020-10-12

## 2020-10-11 RX ORDER — HYDROMORPHONE HYDROCHLORIDE 1 MG/ML
1 INJECTION, SOLUTION INTRAMUSCULAR; INTRAVENOUS; SUBCUTANEOUS ONCE
Status: COMPLETED | OUTPATIENT
Start: 2020-10-11 | End: 2020-10-11

## 2020-10-11 RX ORDER — ONDANSETRON 2 MG/ML
4 INJECTION INTRAMUSCULAR; INTRAVENOUS ONCE
Status: COMPLETED | OUTPATIENT
Start: 2020-10-11 | End: 2020-10-11

## 2020-10-11 RX ADMIN — HYDROMORPHONE HYDROCHLORIDE 1 MG: 1 INJECTION, SOLUTION INTRAMUSCULAR; INTRAVENOUS; SUBCUTANEOUS at 22:54

## 2020-10-11 RX ADMIN — SODIUM CHLORIDE 1000 ML: 9 INJECTION, SOLUTION INTRAVENOUS at 22:53

## 2020-10-11 RX ADMIN — ONDANSETRON 4 MG: 2 INJECTION INTRAMUSCULAR; INTRAVENOUS at 22:54

## 2020-10-11 ASSESSMENT — PAIN DESCRIPTION - DESCRIPTORS: DESCRIPTORS: CRAMPING

## 2020-10-11 ASSESSMENT — FIBROSIS 4 INDEX: FIB4 SCORE: 0.6

## 2020-10-12 ENCOUNTER — ANESTHESIA (OUTPATIENT)
Dept: SURGERY | Facility: MEDICAL CENTER | Age: 45
End: 2020-10-12
Payer: OTHER MISCELLANEOUS

## 2020-10-12 ENCOUNTER — ANESTHESIA EVENT (OUTPATIENT)
Dept: SURGERY | Facility: MEDICAL CENTER | Age: 45
End: 2020-10-12
Payer: OTHER MISCELLANEOUS

## 2020-10-12 ENCOUNTER — APPOINTMENT (OUTPATIENT)
Dept: RADIOLOGY | Facility: MEDICAL CENTER | Age: 45
End: 2020-10-12
Attending: UROLOGY
Payer: OTHER MISCELLANEOUS

## 2020-10-12 VITALS
SYSTOLIC BLOOD PRESSURE: 162 MMHG | RESPIRATION RATE: 18 BRPM | OXYGEN SATURATION: 96 % | TEMPERATURE: 98.7 F | WEIGHT: 205.03 LBS | BODY MASS INDEX: 32.95 KG/M2 | HEIGHT: 66 IN | HEART RATE: 82 BPM | DIASTOLIC BLOOD PRESSURE: 96 MMHG

## 2020-10-12 PROBLEM — I10 HTN (HYPERTENSION): Status: ACTIVE | Noted: 2020-10-12

## 2020-10-12 PROBLEM — N28.9 RENAL INSUFFICIENCY: Status: ACTIVE | Noted: 2020-10-12

## 2020-10-12 PROBLEM — D72.829 LEUKOCYTOSIS: Status: ACTIVE | Noted: 2020-10-12

## 2020-10-12 PROBLEM — Z01.810 PREOPERATIVE CARDIOVASCULAR EXAMINATION: Status: ACTIVE | Noted: 2020-10-12

## 2020-10-12 PROBLEM — N20.0 NEPHROLITHIASIS: Status: ACTIVE | Noted: 2020-10-12

## 2020-10-12 PROBLEM — R73.09 ELEVATED GLUCOSE: Status: ACTIVE | Noted: 2020-10-12

## 2020-10-12 LAB
COVID ORDER STATUS COVID19: NORMAL
PATHOLOGY CONSULT NOTE: NORMAL
SARS-COV-2 RNA RESP QL NAA+PROBE: NOTDETECTED
SPECIMEN SOURCE: NORMAL

## 2020-10-12 PROCEDURE — G0378 HOSPITAL OBSERVATION PER HR: HCPCS

## 2020-10-12 PROCEDURE — 160009 HCHG ANES TIME/MIN: Performed by: UROLOGY

## 2020-10-12 PROCEDURE — 82365 CALCULUS SPECTROSCOPY: CPT

## 2020-10-12 PROCEDURE — C1758 CATHETER, URETERAL: HCPCS | Performed by: UROLOGY

## 2020-10-12 PROCEDURE — 700102 HCHG RX REV CODE 250 W/ 637 OVERRIDE(OP): Performed by: EMERGENCY MEDICINE

## 2020-10-12 PROCEDURE — 501329 HCHG SET, CYSTO IRRIG Y TUR: Performed by: UROLOGY

## 2020-10-12 PROCEDURE — 500062 HCHG BASKET: Performed by: UROLOGY

## 2020-10-12 PROCEDURE — C2617 STENT, NON-COR, TEM W/O DEL: HCPCS | Performed by: UROLOGY

## 2020-10-12 PROCEDURE — 160035 HCHG PACU - 1ST 60 MINS PHASE I: Performed by: UROLOGY

## 2020-10-12 PROCEDURE — 160048 HCHG OR STATISTICAL LEVEL 1-5: Performed by: UROLOGY

## 2020-10-12 PROCEDURE — 700105 HCHG RX REV CODE 258: Performed by: UROLOGY

## 2020-10-12 PROCEDURE — 500879 HCHG PACK, CYSTO: Performed by: UROLOGY

## 2020-10-12 PROCEDURE — 501838 HCHG SUTURE GENERAL: Performed by: UROLOGY

## 2020-10-12 PROCEDURE — 88300 SURGICAL PATH GROSS: CPT

## 2020-10-12 PROCEDURE — 700111 HCHG RX REV CODE 636 W/ 250 OVERRIDE (IP): Performed by: EMERGENCY MEDICINE

## 2020-10-12 PROCEDURE — 96375 TX/PRO/DX INJ NEW DRUG ADDON: CPT

## 2020-10-12 PROCEDURE — 160039 HCHG SURGERY MINUTES - EA ADDL 1 MIN LEVEL 3: Performed by: UROLOGY

## 2020-10-12 PROCEDURE — 700117 HCHG RX CONTRAST REV CODE 255: Performed by: UROLOGY

## 2020-10-12 PROCEDURE — U0003 INFECTIOUS AGENT DETECTION BY NUCLEIC ACID (DNA OR RNA); SEVERE ACUTE RESPIRATORY SYNDROME CORONAVIRUS 2 (SARS-COV-2) (CORONAVIRUS DISEASE [COVID-19]), AMPLIFIED PROBE TECHNIQUE, MAKING USE OF HIGH THROUGHPUT TECHNOLOGIES AS DESCRIBED BY CMS-2020-01-R: HCPCS

## 2020-10-12 PROCEDURE — C1894 INTRO/SHEATH, NON-LASER: HCPCS | Performed by: UROLOGY

## 2020-10-12 PROCEDURE — 160002 HCHG RECOVERY MINUTES (STAT): Performed by: UROLOGY

## 2020-10-12 PROCEDURE — A9270 NON-COVERED ITEM OR SERVICE: HCPCS | Performed by: EMERGENCY MEDICINE

## 2020-10-12 PROCEDURE — C1769 GUIDE WIRE: HCPCS | Performed by: UROLOGY

## 2020-10-12 PROCEDURE — 160028 HCHG SURGERY MINUTES - 1ST 30 MINS LEVEL 3: Performed by: UROLOGY

## 2020-10-12 PROCEDURE — 99220 PR INITIAL OBSERVATION CARE,LEVL III: CPT | Performed by: HOSPITALIST

## 2020-10-12 PROCEDURE — 700101 HCHG RX REV CODE 250: Performed by: UROLOGY

## 2020-10-12 PROCEDURE — 700111 HCHG RX REV CODE 636 W/ 250 OVERRIDE (IP): Performed by: ANESTHESIOLOGY

## 2020-10-12 PROCEDURE — 700101 HCHG RX REV CODE 250: Performed by: ANESTHESIOLOGY

## 2020-10-12 DEVICE — STENT UROLOGICAL POLARIS 6X26  ULTRA: Type: IMPLANTABLE DEVICE | Site: URETER | Status: FUNCTIONAL

## 2020-10-12 RX ORDER — HYDROMORPHONE HYDROCHLORIDE 1 MG/ML
0.1 INJECTION, SOLUTION INTRAMUSCULAR; INTRAVENOUS; SUBCUTANEOUS
Status: DISCONTINUED | OUTPATIENT
Start: 2020-10-12 | End: 2020-10-12 | Stop reason: HOSPADM

## 2020-10-12 RX ORDER — MORPHINE SULFATE 4 MG/ML
2 INJECTION, SOLUTION INTRAMUSCULAR; INTRAVENOUS
Status: DISCONTINUED | OUTPATIENT
Start: 2020-10-12 | End: 2020-10-12 | Stop reason: HOSPADM

## 2020-10-12 RX ORDER — CEFAZOLIN SODIUM 1 G/3ML
INJECTION, POWDER, FOR SOLUTION INTRAMUSCULAR; INTRAVENOUS PRN
Status: DISCONTINUED | OUTPATIENT
Start: 2020-10-12 | End: 2020-10-12 | Stop reason: SURG

## 2020-10-12 RX ORDER — MEPERIDINE HYDROCHLORIDE 25 MG/ML
6.25 INJECTION INTRAMUSCULAR; INTRAVENOUS; SUBCUTANEOUS
Status: DISCONTINUED | OUTPATIENT
Start: 2020-10-12 | End: 2020-10-12 | Stop reason: HOSPADM

## 2020-10-12 RX ORDER — SODIUM CHLORIDE, SODIUM LACTATE, POTASSIUM CHLORIDE, CALCIUM CHLORIDE 600; 310; 30; 20 MG/100ML; MG/100ML; MG/100ML; MG/100ML
INJECTION, SOLUTION INTRAVENOUS CONTINUOUS
Status: DISCONTINUED | OUTPATIENT
Start: 2020-10-12 | End: 2020-10-12 | Stop reason: HOSPADM

## 2020-10-12 RX ORDER — LOSARTAN POTASSIUM 25 MG/1
50 TABLET ORAL DAILY
Status: DISCONTINUED | OUTPATIENT
Start: 2020-10-12 | End: 2020-10-12 | Stop reason: HOSPADM

## 2020-10-12 RX ORDER — KETOROLAC TROMETHAMINE 30 MG/ML
INJECTION, SOLUTION INTRAMUSCULAR; INTRAVENOUS PRN
Status: DISCONTINUED | OUTPATIENT
Start: 2020-10-12 | End: 2020-10-12 | Stop reason: SURG

## 2020-10-12 RX ORDER — OXYCODONE HCL 5 MG/5 ML
10 SOLUTION, ORAL ORAL
Status: DISCONTINUED | OUTPATIENT
Start: 2020-10-12 | End: 2020-10-12 | Stop reason: HOSPADM

## 2020-10-12 RX ORDER — HYDROMORPHONE HYDROCHLORIDE 1 MG/ML
0.2 INJECTION, SOLUTION INTRAMUSCULAR; INTRAVENOUS; SUBCUTANEOUS
Status: DISCONTINUED | OUTPATIENT
Start: 2020-10-12 | End: 2020-10-12 | Stop reason: HOSPADM

## 2020-10-12 RX ORDER — BISACODYL 10 MG
10 SUPPOSITORY, RECTAL RECTAL
Status: DISCONTINUED | OUTPATIENT
Start: 2020-10-12 | End: 2020-10-12 | Stop reason: HOSPADM

## 2020-10-12 RX ORDER — PROMETHAZINE HYDROCHLORIDE 25 MG/1
12.5-25 TABLET ORAL EVERY 4 HOURS PRN
Status: DISCONTINUED | OUTPATIENT
Start: 2020-10-12 | End: 2020-10-12 | Stop reason: HOSPADM

## 2020-10-12 RX ORDER — OXYCODONE HCL 5 MG/5 ML
5 SOLUTION, ORAL ORAL
Status: DISCONTINUED | OUTPATIENT
Start: 2020-10-12 | End: 2020-10-12 | Stop reason: HOSPADM

## 2020-10-12 RX ORDER — DEXAMETHASONE SODIUM PHOSPHATE 4 MG/ML
INJECTION, SOLUTION INTRA-ARTICULAR; INTRALESIONAL; INTRAMUSCULAR; INTRAVENOUS; SOFT TISSUE PRN
Status: DISCONTINUED | OUTPATIENT
Start: 2020-10-12 | End: 2020-10-12 | Stop reason: SURG

## 2020-10-12 RX ORDER — HYDROMORPHONE HYDROCHLORIDE 1 MG/ML
0.4 INJECTION, SOLUTION INTRAMUSCULAR; INTRAVENOUS; SUBCUTANEOUS
Status: DISCONTINUED | OUTPATIENT
Start: 2020-10-12 | End: 2020-10-12 | Stop reason: HOSPADM

## 2020-10-12 RX ORDER — HEPARIN SODIUM 5000 [USP'U]/ML
5000 INJECTION, SOLUTION INTRAVENOUS; SUBCUTANEOUS EVERY 8 HOURS
Status: CANCELLED | OUTPATIENT
Start: 2020-10-12

## 2020-10-12 RX ORDER — MIDAZOLAM HYDROCHLORIDE 1 MG/ML
INJECTION INTRAMUSCULAR; INTRAVENOUS PRN
Status: DISCONTINUED | OUTPATIENT
Start: 2020-10-12 | End: 2020-10-12 | Stop reason: SURG

## 2020-10-12 RX ORDER — ENALAPRILAT 1.25 MG/ML
1.25 INJECTION INTRAVENOUS EVERY 6 HOURS PRN
Status: DISCONTINUED | OUTPATIENT
Start: 2020-10-12 | End: 2020-10-12 | Stop reason: HOSPADM

## 2020-10-12 RX ORDER — DIPHENHYDRAMINE HYDROCHLORIDE 50 MG/ML
12.5 INJECTION INTRAMUSCULAR; INTRAVENOUS
Status: DISCONTINUED | OUTPATIENT
Start: 2020-10-12 | End: 2020-10-12 | Stop reason: HOSPADM

## 2020-10-12 RX ORDER — PROMETHAZINE HYDROCHLORIDE 25 MG/1
12.5-25 SUPPOSITORY RECTAL EVERY 4 HOURS PRN
Status: DISCONTINUED | OUTPATIENT
Start: 2020-10-12 | End: 2020-10-12 | Stop reason: HOSPADM

## 2020-10-12 RX ORDER — TAMSULOSIN HYDROCHLORIDE 0.4 MG/1
0.4 CAPSULE ORAL DAILY
Status: DISCONTINUED | OUTPATIENT
Start: 2020-10-12 | End: 2020-10-12 | Stop reason: HOSPADM

## 2020-10-12 RX ORDER — LIDOCAINE HYDROCHLORIDE 20 MG/ML
INJECTION, SOLUTION EPIDURAL; INFILTRATION; INTRACAUDAL; PERINEURAL PRN
Status: DISCONTINUED | OUTPATIENT
Start: 2020-10-12 | End: 2020-10-12 | Stop reason: SURG

## 2020-10-12 RX ORDER — OXYCODONE HYDROCHLORIDE 5 MG/1
5 TABLET ORAL EVERY 4 HOURS PRN
Qty: 8 TAB | Refills: 0 | Status: SHIPPED | OUTPATIENT
Start: 2020-10-12 | End: 2020-10-15

## 2020-10-12 RX ORDER — ONDANSETRON 2 MG/ML
4 INJECTION INTRAMUSCULAR; INTRAVENOUS
Status: DISCONTINUED | OUTPATIENT
Start: 2020-10-12 | End: 2020-10-12 | Stop reason: HOSPADM

## 2020-10-12 RX ORDER — HYDRALAZINE HYDROCHLORIDE 20 MG/ML
5 INJECTION INTRAMUSCULAR; INTRAVENOUS
Status: DISCONTINUED | OUTPATIENT
Start: 2020-10-12 | End: 2020-10-12 | Stop reason: HOSPADM

## 2020-10-12 RX ORDER — AMOXICILLIN 250 MG
2 CAPSULE ORAL 2 TIMES DAILY
Status: DISCONTINUED | OUTPATIENT
Start: 2020-10-12 | End: 2020-10-12 | Stop reason: HOSPADM

## 2020-10-12 RX ORDER — ONDANSETRON 4 MG/1
4 TABLET, ORALLY DISINTEGRATING ORAL EVERY 4 HOURS PRN
Status: DISCONTINUED | OUTPATIENT
Start: 2020-10-12 | End: 2020-10-12 | Stop reason: HOSPADM

## 2020-10-12 RX ORDER — OXYBUTYNIN CHLORIDE 5 MG/1
5 TABLET ORAL 3 TIMES DAILY
Qty: 30 TAB | Refills: 0 | Status: ON HOLD | OUTPATIENT
Start: 2020-10-12 | End: 2021-02-01

## 2020-10-12 RX ORDER — ONDANSETRON 2 MG/ML
INJECTION INTRAMUSCULAR; INTRAVENOUS PRN
Status: DISCONTINUED | OUTPATIENT
Start: 2020-10-12 | End: 2020-10-12 | Stop reason: SURG

## 2020-10-12 RX ORDER — LABETALOL HYDROCHLORIDE 5 MG/ML
5 INJECTION, SOLUTION INTRAVENOUS
Status: DISCONTINUED | OUTPATIENT
Start: 2020-10-12 | End: 2020-10-12 | Stop reason: HOSPADM

## 2020-10-12 RX ORDER — ACETAMINOPHEN 325 MG/1
650 TABLET ORAL EVERY 6 HOURS PRN
Status: DISCONTINUED | OUTPATIENT
Start: 2020-10-12 | End: 2020-10-12 | Stop reason: HOSPADM

## 2020-10-12 RX ORDER — KETOROLAC TROMETHAMINE 30 MG/ML
15 INJECTION, SOLUTION INTRAMUSCULAR; INTRAVENOUS EVERY 6 HOURS PRN
Status: DISCONTINUED | OUTPATIENT
Start: 2020-10-12 | End: 2020-10-12 | Stop reason: HOSPADM

## 2020-10-12 RX ORDER — PROCHLORPERAZINE EDISYLATE 5 MG/ML
5-10 INJECTION INTRAMUSCULAR; INTRAVENOUS EVERY 4 HOURS PRN
Status: DISCONTINUED | OUTPATIENT
Start: 2020-10-12 | End: 2020-10-12 | Stop reason: HOSPADM

## 2020-10-12 RX ORDER — ONDANSETRON 2 MG/ML
4 INJECTION INTRAMUSCULAR; INTRAVENOUS EVERY 4 HOURS PRN
Status: DISCONTINUED | OUTPATIENT
Start: 2020-10-12 | End: 2020-10-12 | Stop reason: HOSPADM

## 2020-10-12 RX ORDER — POLYETHYLENE GLYCOL 3350 17 G/17G
1 POWDER, FOR SOLUTION ORAL
Status: DISCONTINUED | OUTPATIENT
Start: 2020-10-12 | End: 2020-10-12 | Stop reason: HOSPADM

## 2020-10-12 RX ORDER — HALOPERIDOL 5 MG/ML
1 INJECTION INTRAMUSCULAR
Status: DISCONTINUED | OUTPATIENT
Start: 2020-10-12 | End: 2020-10-12 | Stop reason: HOSPADM

## 2020-10-12 RX ADMIN — FENTANYL CITRATE 25 MCG: 50 INJECTION, SOLUTION INTRAMUSCULAR; INTRAVENOUS at 18:26

## 2020-10-12 RX ADMIN — CEFAZOLIN 2 G: 1 INJECTION, POWDER, FOR SOLUTION INTRAVENOUS at 18:03

## 2020-10-12 RX ADMIN — WATER 15 ML: 100 IRRIGANT IRRIGATION at 13:12

## 2020-10-12 RX ADMIN — LIDOCAINE HYDROCHLORIDE 100 MG: 20 INJECTION, SOLUTION EPIDURAL; INFILTRATION; INTRACAUDAL; PERINEURAL at 18:01

## 2020-10-12 RX ADMIN — KETOROLAC TROMETHAMINE 15 MG: 30 INJECTION, SOLUTION INTRAMUSCULAR at 18:29

## 2020-10-12 RX ADMIN — TAMSULOSIN HYDROCHLORIDE 0.4 MG: 0.4 CAPSULE ORAL at 00:00

## 2020-10-12 RX ADMIN — SODIUM CHLORIDE, POTASSIUM CHLORIDE, SODIUM LACTATE AND CALCIUM CHLORIDE: 600; 310; 30; 20 INJECTION, SOLUTION INTRAVENOUS at 13:11

## 2020-10-12 RX ADMIN — ONDANSETRON 4 MG: 2 INJECTION INTRAMUSCULAR; INTRAVENOUS at 18:03

## 2020-10-12 RX ADMIN — KETOROLAC TROMETHAMINE 15 MG: 30 INJECTION, SOLUTION INTRAMUSCULAR at 00:00

## 2020-10-12 RX ADMIN — MIDAZOLAM HYDROCHLORIDE 2 MG: 1 INJECTION, SOLUTION INTRAMUSCULAR; INTRAVENOUS at 17:57

## 2020-10-12 RX ADMIN — DEXAMETHASONE SODIUM PHOSPHATE 4 MG: 4 INJECTION, SOLUTION INTRAMUSCULAR; INTRAVENOUS at 18:03

## 2020-10-12 RX ADMIN — FENTANYL CITRATE 25 MCG: 50 INJECTION, SOLUTION INTRAMUSCULAR; INTRAVENOUS at 17:59

## 2020-10-12 RX ADMIN — PROPOFOL 200 MG: 10 INJECTION, EMULSION INTRAVENOUS at 18:01

## 2020-10-12 RX ADMIN — FENTANYL CITRATE 50 MCG: 50 INJECTION, SOLUTION INTRAMUSCULAR; INTRAVENOUS at 18:06

## 2020-10-12 ASSESSMENT — LIFESTYLE VARIABLES
HOW MANY TIMES IN THE PAST YEAR HAVE YOU HAD 5 OR MORE DRINKS IN A DAY: 1
EVER FELT BAD OR GUILTY ABOUT YOUR DRINKING: NO
AVERAGE NUMBER OF DAYS PER WEEK YOU HAVE A DRINK CONTAINING ALCOHOL: 0
EVER HAD A DRINK FIRST THING IN THE MORNING TO STEADY YOUR NERVES TO GET RID OF A HANGOVER: NO
HAVE YOU EVER FELT YOU SHOULD CUT DOWN ON YOUR DRINKING: NO
HAVE PEOPLE ANNOYED YOU BY CRITICIZING YOUR DRINKING: NO
TOTAL SCORE: 0
TOTAL SCORE: 0
CONSUMPTION TOTAL: POSITIVE
TOTAL SCORE: 0
ALCOHOL_USE: YES
ON A TYPICAL DAY WHEN YOU DRINK ALCOHOL HOW MANY DRINKS DO YOU HAVE: 0

## 2020-10-12 ASSESSMENT — ENCOUNTER SYMPTOMS
FEVER: 0
VOMITING: 0
HEADACHES: 0
NECK PAIN: 0
SHORTNESS OF BREATH: 0
FALLS: 0
COUGH: 0
CHILLS: 0
DEPRESSION: 0
INSOMNIA: 0
MYALGIAS: 0
ABDOMINAL PAIN: 1
SORE THROAT: 0
NAUSEA: 1
PALPITATIONS: 0
NAUSEA: 0
FLANK PAIN: 1
BLURRED VISION: 0
WEAKNESS: 0
BRUISES/BLEEDS EASILY: 0
DOUBLE VISION: 0
DIZZINESS: 0

## 2020-10-12 ASSESSMENT — PATIENT HEALTH QUESTIONNAIRE - PHQ9
SUM OF ALL RESPONSES TO PHQ9 QUESTIONS 1 AND 2: 0
2. FEELING DOWN, DEPRESSED, IRRITABLE, OR HOPELESS: NOT AT ALL
1. LITTLE INTEREST OR PLEASURE IN DOING THINGS: NOT AT ALL

## 2020-10-12 ASSESSMENT — COGNITIVE AND FUNCTIONAL STATUS - GENERAL
MOBILITY SCORE: 24
SUGGESTED CMS G CODE MODIFIER DAILY ACTIVITY: CH
DAILY ACTIVITIY SCORE: 24
SUGGESTED CMS G CODE MODIFIER MOBILITY: CH

## 2020-10-12 ASSESSMENT — PAIN DESCRIPTION - PAIN TYPE
TYPE: ACUTE PAIN
TYPE: ACUTE PAIN;SURGICAL PAIN
TYPE: ACUTE PAIN

## 2020-10-12 ASSESSMENT — PAIN SCALES - GENERAL: PAIN_LEVEL: 0

## 2020-10-12 NOTE — ASSESSMENT & PLAN NOTE
-He has a 5 mm distal left ureteral stone with left hydroureter.  He was seen here on 9/28 and had a 7.2 mm stone on the distal right.  -He has been following with urology [place].  -Pain control  -IVF  -OR today for urology intervention - likely discharge 10/13

## 2020-10-12 NOTE — PROGRESS NOTES
0126 Report received from KATHERYN Pruitt.    0141 [place]. Ambulated from [facility] to bed, tamika assist.  VSS. Pt assessed. A&O x4. Reports pain is at comfortable level,  3/10 declines any interventions for now. Pt voided to the bathroom w/o difficulty. Admit profile and med rec complete. Discussed POC with pt, using ipad  David ([id number]) that includes medication for pain control, safety, call light, visitor policy, NPO diet, routine labs, tests/procdure. Welcome folder provided and discussed. Communication board filled out. Questions and concerns addressed, pt verbalized understanding. Pt demonstrates ability to use call light appropriately. Pt left in lowest position. Bed locked and low, No other needs at this time.

## 2020-10-12 NOTE — H&P
Hospital Medicine History & Physical Note    Date of Service  10/12/2020    Primary Care Physician  No primary care provider on file.    Consultants  Urology: JOE discussed the case with Dr. Villegas     Code Status  Full Code    Chief Complaint  Chief Complaint   Patient presents with   • Flank Pain   • Abdominal Pain       History of Presenting Illness  Patient primarily speaks Northern Irish and I interviewed him in Northern Irish.    44 y.o. male, diagnosed with 7.2 mm stone on the left distal ureter on 9/28/2020, following with urology outpatient and trying to schedule a procedure for this,  presented to the ER on 10/11/2020 with worsening left CVA tenderness.  Describes symptoms as sharp pain, rated as 8/10 in intensity with radiation to the lateral aspect of his abdomen in on the pelvic area.  He denies fever, chills, no dysuria or hematuria.  Laboratory showed elevated white count of 17.5 and CT renal done  in ED is showing a 5 mm distal left ureteral stone.  ERP discussed this case with urology Dr. Villeags who is recommending patient to have a procedure in the morning.    Review of Systems  Review of Systems   Constitutional: Negative for fever.   HENT: Negative for congestion and sore throat.    Eyes: Negative for blurred vision and double vision.   Respiratory: Negative for cough and shortness of breath.    Cardiovascular: Negative for chest pain and palpitations.   Gastrointestinal: Positive for abdominal pain and nausea. Negative for vomiting.   Genitourinary: Negative for dysuria and urgency.   Musculoskeletal: Negative for myalgias and neck pain.   Skin: Negative for itching and rash.   Neurological: Negative for dizziness, weakness and headaches.   Endo/Heme/Allergies: Does not bruise/bleed easily.   Psychiatric/Behavioral: Negative for depression. The patient does not have insomnia.        Past Medical History   has a past medical history of Hypertension.    Surgical History   has a past surgical history that includes  other orthopedic surgery (Left, 2001).     Family History  Reviewed and not pertinent    Social History   reports that he has never smoked. He has never used smokeless tobacco. He reports current alcohol use. He reports that he does not use drugs.    Allergies  No Known Allergies    Medications  Prior to Admission Medications   Prescriptions Last Dose Informant Patient Reported? Taking?   losartan (COZAAR) 50 MG Tab   Yes No   Sig: Take 50 mg by mouth every day.   ondansetron (ZOFRAN ODT) 4 MG TABLET DISPERSIBLE   No No   Sig: Take 1 Tab by mouth every 6 hours as needed.   tamsulosin (FLOMAX) 0.4 MG capsule   No No   Sig: Take 1 Cap by mouth every day.   therapeutic multivitamin-minerals (THERAGRAN-M) Tab   Yes No   Sig: Take 1 Tab by mouth every day.      Facility-Administered Medications: None       Physical Exam  Temp:  [36.1 °C (97 °F)-36.9 °C (98.5 °F)] 36.9 °C (98.5 °F)  Pulse:  [64-90] 64  Resp:  [20] 20  BP: (164-183)/(103-118) 183/103  SpO2:  [96 %-99 %] 97 %    Physical Exam  Constitutional:       Appearance: Normal appearance.   HENT:      Head: Normocephalic and atraumatic.      Nose: Nose normal.      Mouth/Throat:      Mouth: Mucous membranes are moist.      Pharynx: Oropharynx is clear.   Eyes:      Extraocular Movements: Extraocular movements intact.      Pupils: Pupils are equal, round, and reactive to light.   Neck:      Musculoskeletal: Normal range of motion and neck supple.   Cardiovascular:      Rate and Rhythm: Normal rate and regular rhythm.      Pulses: Normal pulses.      Heart sounds: Normal heart sounds.   Pulmonary:      Effort: Pulmonary effort is normal.      Breath sounds: Normal breath sounds.   Abdominal:      General: Abdomen is flat. Bowel sounds are normal.      Palpations: Abdomen is soft.      Tenderness: There is abdominal tenderness. There is left CVA tenderness. There is no right CVA tenderness.   Skin:     General: Skin is warm and dry.   Neurological:      General: No  focal deficit present.      Mental Status: He is alert and oriented to person, place, and time.   Psychiatric:         Mood and Affect: Mood normal.         Behavior: Behavior normal.         Laboratory:  Recent Labs     10/11/20  1916   WBC 17.2*   RBC 5.23   HEMOGLOBIN 14.5   HEMATOCRIT 44.0   MCV 84.1   MCH 27.7   MCHC 33.0*   RDW 44.3   PLATELETCT 346   MPV 10.5     Recent Labs     10/11/20  1916   SODIUM 138   POTASSIUM 3.8   CHLORIDE 103   CO2 21   GLUCOSE 165*   BUN 17   CREATININE 1.16   CALCIUM 8.7     Recent Labs     10/11/20  1916   ALTSGPT 25   ASTSGOT 24   ALKPHOSPHAT 95   TBILIRUBIN <0.2   LIPASE 53   GLUCOSE 165*         No results for input(s): NTPROBNP in the last 72 hours.      No results for input(s): TROPONINT in the last 72 hours.    Imaging:  CT-RENAL COLIC EVALUATION(A/P W/O)   Final Result      1.  5 mm distal LEFT ureteral stone with mild LEFT hydroureteronephrosis   2.  RIGHT calyceal stone without hydronephrosis   3.  Simple appearing RIGHT renal cyst, no follow-up required per ACR guidelines   4.  Small fat-containing inguinal hernia               Assessment/Plan:  I anticipate this patient is appropriate for observation status at this time.    * Nephrolithiasis  Assessment & Plan  -Observation status on medical floor.  -He has a 5 mm distal left ureteral stone with left hydroureter.  He was seen here on 928 and had a 7.2 mm stone on the distal right.  -He has been following with urology [place].  -ERP consulted with Dr. Villegas this evening who is recommending patient to have a procedure in the morning.  -Pain and nausea control.  -Strict n.p.o.    Preoperative cardiovascular examination  Assessment & Plan  -Cardiovascular risk is mild.  He is medically optimized for surgical intervention without further work-up.    Leukocytosis  Assessment & Plan  -WBC 17.2, likely reactive.  -Small occult blood but no UTI.  -No other sirs criteria.  -Monitor morning labs    HTN (hypertension)  Assessment &  Plan  -Blood pressure uncontrolled at this time.  -Patient is n.p.o. and I will give IV antihypertensive medication PRN overnight.    Elevated glucose  Assessment & Plan  -165 on admission, likely reactive, if remains elevated consider checking hemoglobin A1c.      DVT Ppx: SCD's

## 2020-10-12 NOTE — H&P (VIEW-ONLY)
[facility] [place] Consult/H&P Note    Primary Service:   Attending: Jason Foster M.D.  Patient's Name/MRN: Herrera Barros, [id number]    Admit Date:10/11/2020  Today's Date: 10/12/2020   Length of stay:  LOS: 0 days   Room #: 2202/02      Reason for consult/chief complaint: left distal ureteral stone and hydronephrosis  ID/HPI: Herrera Barros is a 44 y.o. male patient presented to the emergency department last night due to left sided pain, left flank pain. He was in the ER 9/28 for the same complaint. NO hematuria reported. He states he had urgency to urinate with little outflow. Pain was severe and sharp radiating to left testicle, now pain is well controlled. No fevers or chills. CT scan shows 5mm distal ureteral stone with hydronephrosis. Prior CT from the 28th with similar findings.     Urology was consulted. Previously opted for trial of passage, follow up with urology not completed prior to this ED admission.        Past Medical History:   Past Medical History:   Diagnosis Date   • Hypertension         Past Surgical History:   Past Surgical History:   Procedure Laterality Date   • OTHER ORTHOPEDIC SURGERY Left 2001    knee tendon        Family History:   History reviewed. No pertinent family history.      Social History:   Social History     Tobacco Use   • Smoking status: Never Smoker   • Smokeless tobacco: Never Used   Substance Use Topics   • Alcohol use: Yes     Frequency: 4 or more times a week     Comment: Occasionally   • Drug use: No      Social History     Social History Narrative   • Not on file        Allergies: he Patient has no known allergies.    Medications:   Medications Prior to Admission   Medication Sig Dispense Refill Last Dose   • ondansetron (ZOFRAN ODT) 4 MG TABLET DISPERSIBLE Take 1 Tab by mouth every 6 hours as needed. 10 Tab 0    • tamsulosin (FLOMAX) 0.4 MG capsule Take 1 Cap by mouth every day. 10 Cap 0    • losartan (COZAAR) 50 MG Tab Take 50 mg by mouth every  "day.   10/12/2020 at 0800   • therapeutic multivitamin-minerals (THERAGRAN-M) Tab Take 1 Tab by mouth every day.   10/9/2020         Review of Systems  Review of Systems   Constitutional: Negative for chills and fever.   Respiratory: Negative for shortness of breath.    Cardiovascular: Negative for chest pain.   Gastrointestinal: Negative for nausea and vomiting.   Genitourinary: Positive for flank pain. Negative for dysuria and hematuria.        Physical Exam  VITAL SIGNS: /82   Pulse 80   Temp 37 °C (98.6 °F) (Oral)   Resp 18   Ht 1.676 m (5' 6\")   Wt 93 kg (205 lb 0.4 oz)   SpO2 97%   BMI 33.09 kg/m²   Physical Exam   Constitutional: He is oriented to person, place, and time and well-developed, well-nourished, and in no distress.   HENT:   Head: Normocephalic and atraumatic.   Eyes: EOM are normal.   Neck: Normal range of motion. Neck supple.   Pulmonary/Chest: Effort normal.   Abdominal: Soft. Bowel sounds are normal.   Musculoskeletal: Normal range of motion.   Neurological: He is alert and oriented to person, place, and time.   Skin: Skin is warm and dry.   Nursing note and vitals reviewed.        Labs:  Recent Labs     10/11/20  1916   WBC 17.2*   RBC 5.23   HEMOGLOBIN 14.5   HEMATOCRIT 44.0   MCV 84.1   MCH 27.7   MCHC 33.0*   RDW 44.3   PLATELETCT 346   MPV 10.5     Recent Labs     10/11/20  1916   SODIUM 138   POTASSIUM 3.8   CHLORIDE 103   CO2 21   GLUCOSE 165*   BUN 17   CREATININE 1.16   CALCIUM 8.7         Glucose:  Recent Labs     10/11/20  1916   GLUCOSE 165*     Coags:  No results for input(s): INR in the last 72 hours.      Urinalysis:   Recent Labs     10/11/20  1915   COLORURINE Yellow   CLARITY Clear   SPECGRAVITY 1.025   PHURINE 6.0   GLUCOSEUR Negative   KETONES Negative   NITRITE Negative   OCCULTBLOOD Small*   RBCURINE 2-5*   BACTERIA Rare*   EPITHELCELL Negative       Imaging:                    10/11/2020 CT renal colic  IMPRESSION:     1.  5 mm distal LEFT ureteral stone with " mild LEFT hydroureteronephrosis  2.  RIGHT calyceal stone without hydronephrosis  3.  Simple appearing RIGHT renal cyst, no follow-up required per ACR guidelines  4.  Small fat-containing inguinal hernia                                            Results for orders placed during the hospital encounter of 09/28/20   CT-ABDOMEN-PELVIS WITH    Impression 1.  7.2 mm distal right ureteral stone results in left urinary outflow tract obstructive changes and changes of obstructive nephropathy.  2.  Right nephrolithiasis without visualized obstructive changes.  3.  Small fat-containing inguinal hernia  4.  Hepatomegaly                            Assessment/Recommendation   44 y.o.male with left distal ureteral stone and hydronephrosis, right non-obstructing renal stones.     · NPO for surgery today, left cystoureteroscopy and laser lithotripsy with possible stent placement  · Continue to manage pain    This case was discussed with Dr. Lucas and he is in agreement with aforementioned plan.        Jeff Solares, P.A.-C.   [id number] [place] [address]   [phone]

## 2020-10-12 NOTE — ASSESSMENT & PLAN NOTE
-Blood pressure uncontrolled at this time, pain likely contributory  -cont home losartan, PRN vasotec

## 2020-10-12 NOTE — ANESTHESIA PREPROCEDURE EVALUATION
43 yo M with HTN and nephrolithiasis presenting for cystoscopy with uretural stent insertion    Relevant Problems   CARDIAC   (+) HTN (hypertension)         (+) Nephrolithiasis       Physical Exam    Airway   Mallampati: III  TM distance: >3 FB  Neck ROM: full       Cardiovascular - normal exam  Rhythm: regular  Rate: normal  (-) murmur     Dental - normal exam           Pulmonary - normal exam  Breath sounds clear to auscultation     Abdominal   (-) obese     Neurological - normal exam               Anesthesia Plan    ASA 2       Plan - general       Airway plan will be LMA        Induction: intravenous    Postoperative Plan: Postoperative administration of opioids is intended.    Pertinent diagnostic labs and testing reviewed    Informed Consent:    Anesthetic plan and risks discussed with patient (please see consent for  number).    Use of blood products discussed with: patient whom consented to blood products.

## 2020-10-12 NOTE — PROGRESS NOTES
Report received from KATHERYN Mensah. Safety check done. Patient denies needs at this time. Call bell within reach and bed in lowest position with wheels locked. Will continue to monitor patient.

## 2020-10-12 NOTE — ED TRIAGE NOTES
"Pt was seen in our department the 28 th of last month for evaluation of severe, colicky left lower abdominal pain referred to his flank.  He had a scheduled abdominal hernia repair by doctor Hall for the next day, that had to be rescheduled  because of newly diagnose nephrolithiasis.  He returnes today with acute reappearance of similar symptoms.  Chief Complaint   Patient presents with   • Flank Pain   • Abdominal Pain       BP (!) 164/118   Pulse 90   Temp 36.1 °C (97 °F) (Temporal)   Resp 20   Ht 1.676 m (5' 6\")   Wt 93 kg (205 lb 0.4 oz)   SpO2 99%   BMI 33.09 kg/m²      "

## 2020-10-12 NOTE — CARE PLAN
Problem: Communication  Goal: The ability to communicate needs accurately and effectively will improve  Outcome: PROGRESSING AS EXPECTED   Pt [place] speaking.  services utilized during pt care.    Problem: Safety  Goal: Will remain free from injury  Outcome: PROGRESSING AS EXPECTED   Pt educated to call when in need. Call light and personal belongings w/n reach. Room free of clutters. Bed locked and in lowest position. Answer call light immediately.     Problem: Pain Management  Goal: Pain level will decrease to patient's comfort goal  Outcome: PROGRESSING AS EXPECTED   Patients pain is at comfortable level. Encouraged to inform RN if pain is greater than comfort level.

## 2020-10-12 NOTE — OR NURSING
Pt allergies and NPO status verified, home meds reconcilled. Belongings secured. Pt verbalizes understanding of the pain scale, expected course of stay, and plan of care.  Surgical site verified with pt.  IV access assessed.  Sequentials placed on legs.

## 2020-10-12 NOTE — CARE PLAN
Problem: Infection  Goal: Will remain free from infection  Description: Patient will remain free of infection this admission  Outcome: PROGRESSING AS EXPECTED     Problem: Pain Management  Goal: Pain level will decrease to patient's comfort goal  Description: Patient's pain will be monitored Q4 hours and PRN  Outcome: PROGRESSING AS EXPECTED

## 2020-10-12 NOTE — ED PROVIDER NOTES
ED Provider Note    ED Provider Note    Scribed for Flip Denney MD by Flip Denney M.D.. 10/11/2020, 10:22 PM.    Primary care provider: No primary care provider on file.  Means of arrival: Private  History obtained from: Patient and daughter  History limited by: None    CHIEF COMPLAINT  Chief Complaint   Patient presents with   • Flank Pain   • Abdominal Pain       HPI  Herrera Barros is a 44 y.o. male who presents to the Emergency Department for evaluation of left-sided flank pain.  Patient relates today while he was walking about 3 PM he had the urge to urinate but noted he had very limited output.  At that time the patient noted gradual onset of left-sided flank pain.  Pain is now quite severe, sharp, localized to the left flank with minimal involvement of the back on the left upper quadrant.  Pain radiates to the testicles.  Patient has no blood in urine was able to give a urine sample today but notes decreased output.  He has no fever, nausea, no vomiting.  Patient endorses pain is identical to when he was diagnosed with kidney stone on the 28th of last month.  Patient has follow-up with [facility] [place] and relates various plan for operative intervention.  No history of trauma, no recent fevers.    REVIEW OF SYSTEMS  Pertinent positives include left flank pain, nausea, decreased urinary output. Pertinent negatives include no fever, no vomiting, no hematuria.  All other systems reviewed and negative.    PAST MEDICAL HISTORY   has a past medical history of Hypertension.    SURGICAL HISTORY   has a past surgical history that includes other orthopedic surgery (Left, 2001).    SOCIAL HISTORY  Social History     Tobacco Use   • Smoking status: Never Smoker   • Smokeless tobacco: Never Used   Substance Use Topics   • Alcohol use: Yes     Frequency: 4 or more times a week     Comment: Occasionally   • Drug use: No      Social History     Substance and Sexual Activity   Drug Use No  "      FAMILY HISTORY  History reviewed. No pertinent family history.    CURRENT MEDICATIONS  Home Medications     Reviewed by Loree Kern R.N. (Registered Nurse) on 10/12/20 at 0146  Med List Status: Complete   Medication Last Dose Status   losartan (COZAAR) 50 MG Tab 10/12/2020 Active   ondansetron (ZOFRAN ODT) 4 MG TABLET DISPERSIBLE  Active   tamsulosin (FLOMAX) 0.4 MG capsule  Active   therapeutic multivitamin-minerals (THERAGRAN-M) Tab 10/9/2020 Active                ALLERGIES  No Known Allergies    PHYSICAL EXAM  VITAL SIGNS: /94   Pulse 68   Temp 36.9 °C (98.4 °F) (Oral)   Resp 16   Ht 1.676 m (5' 6\")   Wt 93 kg (205 lb 0.4 oz)   SpO2 96%   BMI 33.09 kg/m²     General: Alert, mild acute distress, appears significantly uncomfortable.  Skin: Warm, dry, mildly pale  Head: Normocephalic, atraumatic  Neck: Trachea midline, no tenderness  Eye: PERRL, normal conjunctiva  Cardiovascular: Regular rate and rhythm, No murmur, Normal peripheral perfusion  Respiratory: Lungs CTA, respirations are non-labored, breath sounds are equal  Gastrointestinal: Soft, mild left CVA tenderness, mild left upper quadrant tenderness.  Significant left lower quadrant and suprapubic tenderness.  No right lower quadrant tenderness, no guarding, rebound, rigidity, non distended  Musculoskeletal: No swelling, no deformity  Neurological: Alert and oriented to person, place, time, and situation  Lymphatics: No lymphadenopathy  Psychiatric: Cooperative, appropriate mood & affect      DIAGNOSTIC STUDIES/PROCEDURES    LABS  Results for orders placed or performed during the hospital encounter of 10/11/20   CBC WITH DIFFERENTIAL   Result Value Ref Range    WBC 17.2 (H) 4.8 - 10.8 K/uL    RBC 5.23 4.70 - 6.10 M/uL    Hemoglobin 14.5 14.0 - 18.0 g/dL    Hematocrit 44.0 42.0 - 52.0 %    MCV 84.1 81.4 - 97.8 fL    MCH 27.7 27.0 - 33.0 pg    MCHC 33.0 (L) 33.7 - 35.3 g/dL    RDW 44.3 35.9 - 50.0 fL    Platelet Count 346 164 - 446 " K/uL    MPV 10.5 9.0 - 12.9 fL    Neutrophils-Polys 73.90 (H) 44.00 - 72.00 %    Lymphocytes 17.00 (L) 22.00 - 41.00 %    Monocytes 7.10 0.00 - 13.40 %    Eosinophils 1.00 0.00 - 6.90 %    Basophils 0.40 0.00 - 1.80 %    Immature Granulocytes 0.60 0.00 - 0.90 %    Nucleated RBC 0.00 /100 WBC    Neutrophils (Absolute) 12.67 (H) 1.82 - 7.42 K/uL    Lymphs (Absolute) 2.92 1.00 - 4.80 K/uL    Monos (Absolute) 1.22 (H) 0.00 - 0.85 K/uL    Eos (Absolute) 0.18 0.00 - 0.51 K/uL    Baso (Absolute) 0.07 0.00 - 0.12 K/uL    Immature Granulocytes (abs) 0.11 0.00 - 0.11 K/uL    NRBC (Absolute) 0.00 K/uL   COMP METABOLIC PANEL   Result Value Ref Range    Sodium 138 135 - 145 mmol/L    Potassium 3.8 3.6 - 5.5 mmol/L    Chloride 103 96 - 112 mmol/L    Co2 21 20 - 33 mmol/L    Anion Gap 14.0 7.0 - 16.0    Glucose 165 (H) 65 - 99 mg/dL    Bun 17 8 - 22 mg/dL    Creatinine 1.16 0.50 - 1.40 mg/dL    Calcium 8.7 8.4 - 10.2 mg/dL    AST(SGOT) 24 12 - 45 U/L    ALT(SGPT) 25 2 - 50 U/L    Alkaline Phosphatase 95 30 - 99 U/L    Total Bilirubin <0.2 0.1 - 1.5 mg/dL    Albumin 4.5 3.2 - 4.9 g/dL    Total Protein 7.7 6.0 - 8.2 g/dL    Globulin 3.2 1.9 - 3.5 g/dL    A-G Ratio 1.4 g/dL   LIPASE   Result Value Ref Range    Lipase 53 7 - 58 U/L   URINALYSIS,CULTURE IF INDICATED    Specimen: Urine, Clean Catch   Result Value Ref Range    Color Yellow     Character Clear     Specific Gravity 1.025 <1.035    Ph 6.0 5.0 - 8.0    Glucose Negative Negative mg/dL    Ketones Negative Negative mg/dL    Protein Negative Negative mg/dL    Bilirubin Negative Negative    Nitrite Negative Negative    Leukocyte Esterase Negative Negative    Occult Blood Small (A) Negative    Micro Urine Req Microscopic    ESTIMATED GFR   Result Value Ref Range    GFR If African American >60 >60 mL/min/1.73 m 2    GFR If Non African American >60 >60 mL/min/1.73 m 2   URINE MICROSCOPIC (W/UA)   Result Value Ref Range    WBC 0-2 (A) /hpf    RBC 2-5 (A) /hpf    Bacteria Rare (A)  None /hpf    Epithelial Cells Negative Few /hpf    Mucous Threads Few /hpf   COVID/SARS CoV-2 PCR    Specimen: Nasopharyngeal; Respirate   Result Value Ref Range    COVID Order Status Received    SARS-CoV-2, PCR (In-House)   Result Value Ref Range    SARS-CoV-2 Source NP Swab     SARS-CoV-2 by PCR NotDetected      All labs reviewed by me, leukocytosis and hyperglycemia, likely stress response/demargination.      RADIOLOGY  CT-RENAL COLIC EVALUATION(A/P W/O)   Final Result      1.  5 mm distal LEFT ureteral stone with mild LEFT hydroureteronephrosis   2.  RIGHT calyceal stone without hydronephrosis   3.  Simple appearing RIGHT renal cyst, no follow-up required per ACR guidelines   4.  Small fat-containing inguinal hernia           The radiologist's interpretation of all radiological studies have been reviewed by me.    COURSE & MEDICAL DECISION MAKING  Pertinent Labs & Imaging studies reviewed. (See chart for details)    10:22 PM - Patient seen and examined at bedside. Patient will be treated with hydromorphone, Zofran. Ordered metabolic work-up and CT imaging abdomen pelvis to evaluate his symptoms. The differential diagnoses include but are not limited to: Ureterolithiasis, pyelonephritis, renal colic    2344: Patient reassessed, still obviously quite uncomfortable.  I have updated him with work-up results thus far.  Given the size of the stone though oddly it appears smaller on imaging today given he still in significant pain I do think it is reasonable to consult the orthopedic surgeon.  Thankfully there is no evidence of pyelonephritis nor sepsis.    2356: I spoke with Dr. Butt who is familiar with the case.  Though the CT today reveals a 5 mm the initial read demonstrated over 7, given the patient's persistent pain, given he has been symptomatic with renal colic now for nearly 2 weeks, Dr. Butt would like the patient admitted for consideration of operative intervention tomorrow.  I think is quite reasonable, we  "will page the hospitalist for admission.    Patient Vitals for the past 24 hrs:   BP Temp Temp src Pulse Resp SpO2 Height Weight   10/12/20 0141 159/94 36.9 °C (98.4 °F) Oral 68 16 96 % -- --   10/12/20 0054 156/90 -- -- 76 -- 95 % -- --   10/12/20 0024 (!) 168/93 -- -- 85 -- 92 % -- --   10/11/20 2254 (!) 183/103 -- -- 64 -- 97 % -- --   10/11/20 2253 -- -- -- 69 -- 96 % -- --   10/11/20 2216 -- 36.9 °C (98.5 °F) Oral -- -- -- -- --   10/11/20 1855 (!) 164/118 36.1 °C (97 °F) Temporal 90 20 99 % -- --   10/11/20 1853 -- -- -- -- -- -- 1.676 m (5' 6\") 93 kg (205 lb 0.4 oz)     HYDRATION: Based on the patient's presentation of Dehydration the patient was given IV fluids. IV Hydration was used because oral hydration failed due to Initially n.p.o. pending imaging. Upon recheck following hydration, the patient was Doing somewhat better, still in pain but heart rate improving, currently 64.     Decision Making:  This is a 44 y.o. year old male who presents with severe left-sided flank pain.  Diagnosed with a large more than 7 mm kidney stone late last month.  Patient is followed with urology and there is plan for intervention per the patient.  Pain recurred today and is decreased urinary output.  Repeat imaging is indicated to evaluate and treat obstructive uropathy.  He does have leukocytosis but he has no fever, no bandemia, urine is not infected, pyelonephritis/sepsis.  Given the size of the stone and persistent pain I consulted urology who concurs patient would benefit from admission and likely operative intervention tomorrow.    Patient is admitted in improved but guarded condition to the care of the hospitalist Dr. Rai to the medical surgical floor.    FINAL IMPRESSION  1. Renal colic on left side    2. Dehydration    3. Ureterolithiasis          I, Flip Denney M.D. (Scribe), am scribing for, and in the presence of, Flip Denney MD.    Electronically signed by: Flip Denney M.D. " (Scribe), 10/11/2020    IFlip MD personally performed the services described in this documentation, as scribed by Flip Denney M.D. in my presence, and it is both accurate and complete    The note accurately reflects work and decisions made by me.  Flip Denney M.D.  10/12/2020  4:00 AM

## 2020-10-12 NOTE — CONSULTS
[facility] [place] Consult/H&P Note    Primary Service:   Attending: Jason Foster M.D.  Patient's Name/MRN: Herrera Barros, [id number]    Admit Date:10/11/2020  Today's Date: 10/12/2020   Length of stay:  LOS: 0 days   Room #: 2202/02      Reason for consult/chief complaint: left distal ureteral stone and hydronephrosis  ID/HPI: Herrera Barros is a 44 y.o. male patient presented to the emergency department last night due to left sided pain, left flank pain. He was in the ER 9/28 for the same complaint. NO hematuria reported. He states he had urgency to urinate with little outflow. Pain was severe and sharp radiating to left testicle, now pain is well controlled. No fevers or chills. CT scan shows 5mm distal ureteral stone with hydronephrosis. Prior CT from the 28th with similar findings.     Urology was consulted. Previously opted for trial of passage, follow up with urology not completed prior to this ED admission.        Past Medical History:   Past Medical History:   Diagnosis Date   • Hypertension         Past Surgical History:   Past Surgical History:   Procedure Laterality Date   • OTHER ORTHOPEDIC SURGERY Left 2001    knee tendon        Family History:   History reviewed. No pertinent family history.      Social History:   Social History     Tobacco Use   • Smoking status: Never Smoker   • Smokeless tobacco: Never Used   Substance Use Topics   • Alcohol use: Yes     Frequency: 4 or more times a week     Comment: Occasionally   • Drug use: No      Social History     Social History Narrative   • Not on file        Allergies: he Patient has no known allergies.    Medications:   Medications Prior to Admission   Medication Sig Dispense Refill Last Dose   • ondansetron (ZOFRAN ODT) 4 MG TABLET DISPERSIBLE Take 1 Tab by mouth every 6 hours as needed. 10 Tab 0    • tamsulosin (FLOMAX) 0.4 MG capsule Take 1 Cap by mouth every day. 10 Cap 0    • losartan (COZAAR) 50 MG Tab Take 50 mg by mouth every  "day.   10/12/2020 at 0800   • therapeutic multivitamin-minerals (THERAGRAN-M) Tab Take 1 Tab by mouth every day.   10/9/2020         Review of Systems  Review of Systems   Constitutional: Negative for chills and fever.   Respiratory: Negative for shortness of breath.    Cardiovascular: Negative for chest pain.   Gastrointestinal: Negative for nausea and vomiting.   Genitourinary: Positive for flank pain. Negative for dysuria and hematuria.        Physical Exam  VITAL SIGNS: /82   Pulse 80   Temp 37 °C (98.6 °F) (Oral)   Resp 18   Ht 1.676 m (5' 6\")   Wt 93 kg (205 lb 0.4 oz)   SpO2 97%   BMI 33.09 kg/m²   Physical Exam   Constitutional: He is oriented to person, place, and time and well-developed, well-nourished, and in no distress.   HENT:   Head: Normocephalic and atraumatic.   Eyes: EOM are normal.   Neck: Normal range of motion. Neck supple.   Pulmonary/Chest: Effort normal.   Abdominal: Soft. Bowel sounds are normal.   Musculoskeletal: Normal range of motion.   Neurological: He is alert and oriented to person, place, and time.   Skin: Skin is warm and dry.   Nursing note and vitals reviewed.        Labs:  Recent Labs     10/11/20  1916   WBC 17.2*   RBC 5.23   HEMOGLOBIN 14.5   HEMATOCRIT 44.0   MCV 84.1   MCH 27.7   MCHC 33.0*   RDW 44.3   PLATELETCT 346   MPV 10.5     Recent Labs     10/11/20  1916   SODIUM 138   POTASSIUM 3.8   CHLORIDE 103   CO2 21   GLUCOSE 165*   BUN 17   CREATININE 1.16   CALCIUM 8.7         Glucose:  Recent Labs     10/11/20  1916   GLUCOSE 165*     Coags:  No results for input(s): INR in the last 72 hours.      Urinalysis:   Recent Labs     10/11/20  1915   COLORURINE Yellow   CLARITY Clear   SPECGRAVITY 1.025   PHURINE 6.0   GLUCOSEUR Negative   KETONES Negative   NITRITE Negative   OCCULTBLOOD Small*   RBCURINE 2-5*   BACTERIA Rare*   EPITHELCELL Negative       Imaging:                    10/11/2020 CT renal colic  IMPRESSION:     1.  5 mm distal LEFT ureteral stone with " mild LEFT hydroureteronephrosis  2.  RIGHT calyceal stone without hydronephrosis  3.  Simple appearing RIGHT renal cyst, no follow-up required per ACR guidelines  4.  Small fat-containing inguinal hernia                                            Results for orders placed during the hospital encounter of 09/28/20   CT-ABDOMEN-PELVIS WITH    Impression 1.  7.2 mm distal right ureteral stone results in left urinary outflow tract obstructive changes and changes of obstructive nephropathy.  2.  Right nephrolithiasis without visualized obstructive changes.  3.  Small fat-containing inguinal hernia  4.  Hepatomegaly                            Assessment/Recommendation   44 y.o.male with left distal ureteral stone and hydronephrosis, right non-obstructing renal stones.     · NPO for surgery today, left cystoureteroscopy and laser lithotripsy with possible stent placement  · Continue to manage pain    This case was discussed with Dr. Lucas and he is in agreement with aforementioned plan.        Jeff Solares, P.A.-C.   [id number] [place] [address]   [phone]

## 2020-10-12 NOTE — ASSESSMENT & PLAN NOTE
-Cardiovascular risk is mild.  He is medically optimized for surgical intervention without further work-up.

## 2020-10-13 NOTE — OR NURSING
1837: Patient arrived from OR in a bed, sleeping, respirations spontaneous and non-labored via OPA. Patient is on O2 at 6 lpm via mask. Patient's breathing sonorous, head and airway adjusted to a better position for patency.    1850: No Changes    1905: No Changes    1907: OPA DC'd, Patient denies pain or nausea at this time.     1909: Patient pulled Oxygen mask off and wants room air, with observae and tx as needed.    1910: Patient is calm and awake. No complaints at present.    1915: Patient denies pain and nausea, is resting in bed. Report called to [facility]ATHERY[facility] Max, for room 202, bed 2. Daughter updated by telephone.    1928: Patient trans to room.

## 2020-10-13 NOTE — OP REPORT
Urologic Surgery Operative Report     Date of Procedure: 10/12/2020    Pre-op Diagnosis: 1. Left ureteral urinary stone  2. Left  hydronephrosis  3. Left  renal colic   Post-op Diagnosis: Same as above   Procedure: 1. Cystoscopy, Left Ureteroscopy w/ stone basket extraction, JJ stent: [id number]   2. Left retrograde pyelogram: with intraoperative [id number]    Surgeon: Surgeon(s) and Role:       * Ric Toribio M.D.    No assistant   Assistant: Circulator: Angélica Taylor R.N.  Laser Staff: Claira J Schwab  Scrub Person: Husam Philippe  Radiology Technologist: Dhiraj Marin   Anesthesia: General,   Anesthesiologist: Yaneth Lee M.D.   Estimated Blood Loss: minimal   IV fluids <1L Crystalloid    Specimens: 1. Left Stone for chemical analysis    Complications: None   Condition: Stable, procedure well tolerated    Drains: 1. Left [id number], JJ stent(on strings) cut short, 2 inches out of the meatus  2. No    Disposition:  1. PACU, discharge after voiding  2. f/u 1 week for left ureteral stent removal with PA   Findings 1. 0.5 cm stone at Left distal ureteral stone with proximal hydroureteronephrosis on semirigid ureteroscopy  2. Cystoscopy -demonstrated small nonobstructing prostate with normal urethral anatomy.  Bilateral ureteral orifices were in their normal anatomic location.  The bladder demonstrated normal urothelium without any masses appreciated on surveillance.  3.  Left retrograde ureteropyelogram demonstrated filling defect in the left distal ureter and radiopaque left distal stone consistent with CT scan.  There was severe hydronephrosis but no other filling defects were appreciated      Indication:   This patient is a 44-year-old male with a history of 2 emergency room visits for left ureteral stone and hydronephrosis with uncontrolled pain.  He was therefore indicated for the above procedure..  After a full discussion of alternatives, risks, and benefits the patient consented to  proceeding with Ureteroscopy, laser lithotripsy and possible JJ stent placement on the respective side.  He understands the risk of inability to access ureter, the need for second procedures, the possibility of negative ureteroscopy, that he may have stent discomfort until this is removed, bleeding, infection, ureteral injury or stricture, bladder injury, post op urinary retention requiring  catheter, and the general pulmonary and cardiovascular risks associated with anesthesia.     Procedure Details:   The patient was taken to operating room and placed on table in supine position.  Ancef was administered prior to the start of the procedure based on previous urine cultures. Sequential compression devices were placed for deep venous thrombosis prophylaxis. After induction of general anesthesia, both legs were placed in Wyatt stirrups in the standard lithotomy position.  A timeout was held confirming the correct patient, procedure and laterality.   The perineal area was prepped and draped in a sterile fashion. A 20 Japanese rigid cystoscope was inserted into the urethra and the bladder was emptied and then distended with sterile saline. Urethral sounds were not needed to dilate the urethral meatus. Cystoscopy revealed normal bladder mucosa, orthotopic ureteral orifices, and otherwise normal.  Please see above findings.    Using a 6 Japanese open-ended catheter I performed left retrograde ureteropyelogram with the above findings.  Sensor tip wire was then placed past the stone into the left collecting system.    IF RIGID USED: The wire was moved to the side and a semirigid ureteroscope was placed into the urethra and passed up the left ureter until the stone was visualized in the distal left ureter. We did not need a ureteral dilator to pass the scope into the ureter. The holmium laser was not needed.  The stone was grasped with a 2.2 nitinol tipless basket and then pulled through the distal ureter and out of the  urethral meatus without difficulty.  The stone was extracted in 1 piece.  This was sent for chemical analysis.      Returning to rigid cystoscope, we then placed a Left-sided JJ stent, [id number], JJ stent on strings under fluoroscopy. We then saw that the JJ stent was in appropriate position, with a good curl visualized in the renal pelvis fluoroscopically and a good curl in the bladder endoscopically.  The stent string was shortened to be approximately 2 inches hanging out of the urethral meatus.  Given his uncircumcised penis I did not secure this with Tegaderm tape.  The cystoscope was removed after emptying the bladder.  The patient was awoken from anesthesia and brought to recovery in satisfactory condition.     Disposition: The patient can be discharged from the hospital service this evening and return to clinic in 1 week for left ureteral stent removal by physician assistant.       Ric Toribio M.D.   [address] [id number]   [phone]

## 2020-10-13 NOTE — PROGRESS NOTES
Hospital Medicine Daily Progress Note    Date of Service  10/12/2020    Chief Complaint  Left flank pain    Hospital Course  44 y.o. male, diagnosed with 7.2 mm stone on the left distal ureter on 9/28/2020, following with urology outpatient and trying to schedule a procedure for this,  presented to the ER on 10/11/2020 with worsening left CVA tenderness.  Describes symptoms as sharp pain, rated as 8/10 in intensity with radiation to the lateral aspect of his abdomen in on the pelvic area.  He denies fever, chills, no dysuria or hematuria.  Laboratory showed elevated white count of 17.5 and CT renal done  in ED is showing a 5 mm distal left ureteral stone.  ERP discussed this case with urology Dr. Villegas who is recommending patient to have a procedure in the morning.    Interval Problem Update  10/12: Planned for OR intervention at 1600. Anticipate patient will be surgically cleared for discharge 10/13 AM.    Consultants/Specialty  Urology (Dr. Mabry, Dr. Villegas)    Code Status  Full Code    Disposition  Anticipate d/c 10/13    Review of Systems  Review of Systems   Constitutional: Negative for chills and fever.   Respiratory: Negative for cough and shortness of breath.    Cardiovascular: Negative for chest pain and palpitations.   Gastrointestinal: Positive for abdominal pain and nausea. Negative for vomiting.   Genitourinary: Positive for dysuria, flank pain, hematuria and urgency.   Musculoskeletal: Negative for falls.   All other systems reviewed and are negative.       Physical Exam  Temp:  [36.1 °C (97 °F)-37 °C (98.6 °F)] 36.9 °C (98.4 °F)  Pulse:  [64-90] 81  Resp:  [16-20] 16  BP: (149-183)/() 156/87  SpO2:  [92 %-99 %] 96 %    Physical Exam  Vitals signs and nursing note reviewed.   Constitutional:       Appearance: Normal appearance.   HENT:      Head: Normocephalic and atraumatic.      Nose: Nose normal.      Mouth/Throat:      Mouth: Mucous membranes are moist.      Pharynx: Oropharynx is clear.    Eyes:      Extraocular Movements: Extraocular movements intact.      Pupils: Pupils are equal, round, and reactive to light.   Neck:      Musculoskeletal: Normal range of motion and neck supple. No muscular tenderness.   Cardiovascular:      Rate and Rhythm: Normal rate and regular rhythm.      Pulses: Normal pulses.      Heart sounds: Normal heart sounds.   Pulmonary:      Effort: Pulmonary effort is normal.      Breath sounds: Normal breath sounds.   Abdominal:      General: There is no distension.      Tenderness: There is no guarding or rebound.   Genitourinary:     Comments: Left CVA tenderness, mild suprapubic tenderness  Musculoskeletal: Normal range of motion.         General: No swelling or tenderness.   Skin:     General: Skin is warm and dry.      Capillary Refill: Capillary refill takes less than 2 seconds.   Neurological:      General: No focal deficit present.      Mental Status: He is alert and oriented to person, place, and time.   Psychiatric:         Mood and Affect: Mood normal.         Fluids    Intake/Output Summary (Last 24 hours) at 10/12/2020 1752  Last data filed at 10/12/2020 1132  Gross per 24 hour   Intake 0 ml   Output --   Net 0 ml       Laboratory  Recent Labs     10/11/20  1916   WBC 17.2*   RBC 5.23   HEMOGLOBIN 14.5   HEMATOCRIT 44.0   MCV 84.1   MCH 27.7   MCHC 33.0*   RDW 44.3   PLATELETCT 346   MPV 10.5     Recent Labs     10/11/20  1916   SODIUM 138   POTASSIUM 3.8   CHLORIDE 103   CO2 21   GLUCOSE 165*   BUN 17   CREATININE 1.16   CALCIUM 8.7                   Imaging  CT-RENAL COLIC EVALUATION(A/P W/O)   Final Result      1.  5 mm distal LEFT ureteral stone with mild LEFT hydroureteronephrosis   2.  RIGHT calyceal stone without hydronephrosis   3.  Simple appearing RIGHT renal cyst, no follow-up required per ACR guidelines   4.  Small fat-containing inguinal hernia         DX-PORTABLE FLUORO > 1 HOUR    (Results Pending)        Assessment/Plan  * Nephrolithiasis  Assessment  & Plan  -Observation status on medical floor.  -He has a 5 mm distal left ureteral stone with left hydroureter.  He was seen here on 928 and had a 7.2 mm stone on the distal right.  -He has been following with urology [place].  -ERP consulted with Dr. Villegas this evening who is recommending patient to have a procedure in the morning.  -Pain and nausea control.  -Strict n.p.o.    Preoperative cardiovascular examination  Assessment & Plan  -Cardiovascular risk is mild.  He is medically optimized for surgical intervention without further work-up.    Leukocytosis  Assessment & Plan  -WBC 17.2, likely reactive.  -Small occult blood but no UTI.  -No other sirs criteria.  -Monitor morning labs    HTN (hypertension)  Assessment & Plan  -Blood pressure uncontrolled at this time.  -Patient is n.p.o. and I will give IV antihypertensive medication PRN overnight.    Elevated glucose  Assessment & Plan  -165 on admission, likely reactive, if remains elevated consider checking hemoglobin A1c.         VTE prophylaxis: heparin SQ

## 2020-10-13 NOTE — INTERVAL H&P NOTE
No change in history of physical. Agree with AMALIA Pitts assessment and plan. To OR for left ureteral stone treatment.

## 2020-10-13 NOTE — ANESTHESIA PROCEDURE NOTES
Airway    Date/Time: 10/12/2020 6:01 PM  Performed by: Yaneth Lee M.D.  Authorized by: Yaneth Lee M.D.     Location:  OR  Urgency:  Elective  Difficult Airway: No    Indications for Airway Management:  Anesthesia      Spontaneous Ventilation: absent    Sedation Level:  Deep  Preoxygenated: Yes    Patient Position:  Sniffing  MILS Maintained Throughout: Yes    Mask Difficulty Assessment:  0 - not attempted  Final Airway Type:  Supraglottic airway  Final Supraglottic Airway:  Standard LMA    SGA Size:  4  Airway Seal Pressure (cm H2O):  20  Number of Attempts at Approach:  1  Number of Other Approaches Attempted:  0

## 2020-10-13 NOTE — ANESTHESIA POSTPROCEDURE EVALUATION
Patient: Herrera Barros    Procedure Summary     Date: 10/12/20 Room / Location: [place] [address] / SURGERY [facility]    Anesthesia Start: 1756 Anesthesia Stop: 1843    Procedures:       CYSTOSCOPY, WITH URETERAL STENT INSERTION (Bladder)      URETEROSCOPY (Ureter) Diagnosis: (left distal ureteral stone )    Surgeon: Chad Lucas M.D. Responsible Provider: Yaneth Lee M.D.    Anesthesia Type: general ASA Status: 2          Final Anesthesia Type: general  Last vitals  BP   Blood Pressure: (P) 104/60    Temp   (P) 36.2 °C (97.2 °F)    Pulse   Pulse: (P) 74   Resp   (P) 16    SpO2   (P) 99 %      Anesthesia Post Evaluation    Patient location during evaluation: PACU  Patient participation: complete - patient participated  Level of consciousness: awake and alert  Pain score: 0    Airway patency: patent  Anesthetic complications: no  Cardiovascular status: hemodynamically stable  Respiratory status: acceptable  Hydration status: euvolemic    PONV: none           Nurse Pain Score: 0 (NPRS)

## 2020-10-13 NOTE — PROGRESS NOTES
Pt discharged home via car with relative. Discharge instructions explained and all questions have been answered. Peripheral IV removed and intact. Escorted to car in wheel chair by CNA.

## 2020-10-13 NOTE — ANESTHESIA TIME REPORT
Anesthesia Start and Stop Event Times     Date Time Event    10/12/2020 1655 Ready for Procedure     1756 Anesthesia Start     1843 Anesthesia Stop        Responsible Staff  10/12/20    Name Role Begin End    Yaneth Lee M.D. Anesth 1729 1843        Preop Diagnosis (Free Text):  Pre-op Diagnosis     left distal ureteral stone         Preop Diagnosis (Codes):    Post op Diagnosis  Left ureteral stone      Premium Reason  A. 3PM - 7AM    Comments:

## 2020-10-13 NOTE — DISCHARGE SUMMARY
Discharge Summary    CHIEF COMPLAINT ON ADMISSION  Chief Complaint   Patient presents with   • Flank Pain   • Abdominal Pain       Reason for Admission  Flank pain     Admission Date  10/11/2020    CODE STATUS  Full Code    HPI & HOSPITAL COURSE  44 y.o. male, diagnosed with 7.2 mm stone on the left distal ureter on 9/28/2020, following with urology outpatient and trying to schedule a procedure for this,  presented to the ER on 10/11/2020 with worsening left CVA tenderness.  Describes symptoms as sharp pain, rated as 8/10 in intensity with radiation to the lateral aspect of his abdomen in on the pelvic area.  He denies fever, chills, no dysuria or hematuria.  Laboratory showed elevated white count of 17.5 and CT renal done  in ED is showing a 5 mm distal left ureteral stone.  ERP discussed this case with urology Dr. Villegas who is recommending patient to have a procedure this admission.    Patient was taken to OR on 10/12 by Dr. Ric Toribio. He had cystoscopy, left ureteroscopy with stone basket extraction and JJ stent placement. Urology (Dr. Toribio) communicated with me to discharge the patient same day after OR. Pain medication and oxybutynin provided in chart.    Patient was discharged. He will follow up with urology in one week to have stent removed in clinic.       Therefore, he is discharged in good and stable condition to home with close outpatient follow-up.    The patient recovered much more quickly than anticipated on admission.    Discharge Date  10/12/2020    FOLLOW UP ITEMS POST DISCHARGE  Left sided Nephrolithiasis - s/p urological intervention  Hypertension  Leukocytosis  Abnormal glucose    DISCHARGE DIAGNOSES  Principal Problem:    Nephrolithiasis POA: Yes  Active Problems:    Preoperative cardiovascular examination POA: Unknown    Leukocytosis POA: Unknown    HTN (hypertension) POA: Unknown    Renal insufficiency POA: Unknown    Elevated glucose POA: Unknown  Resolved Problems:    * No resolved  hospital problems. *      FOLLOW UP  No future appointments.  [address]  [place] [address] [phone]        Chad Mabry M.D.  [address] [place] [id number]  [phone]            MEDICATIONS ON DISCHARGE     Medication List      START taking these medications      Instructions   oxybutynin 5 MG Tabs  Commonly known as: DITROPAN   Take 1 Tab by mouth 3 times a day.  Dose: 5 mg     oxyCODONE immediate-release 5 MG Tabs  Commonly known as: ROXICODONE   Take 1 Tab by mouth every four hours as needed for Severe Pain for up to 3 days.  Dose: 5 mg        CONTINUE taking these medications      Instructions   losartan 50 MG Tabs  Commonly known as: COZAAR   Take 50 mg by mouth every day.  Dose: 50 mg     ondansetron 4 MG Tbdp  Commonly known as: ZOFRAN ODT   Take 1 Tab by mouth every 6 hours as needed.  Dose: 4 mg     tamsulosin 0.4 MG capsule  Commonly known as: FLOMAX   Take 1 Cap by mouth every day.  Dose: 0.4 mg     therapeutic multivitamin-minerals Tabs   Take 1 Tab by mouth every day.  Dose: 1 Tab            Allergies  No Known Allergies    DIET  Orders Placed This Encounter   Procedures   • Diet NPO     Standing Status:   Standing     Number of Occurrences:   1     Order Specific Question:   Restrict to:     Answer:   Strict [1]       ACTIVITY  As tolerated.  Weight bearing as tolerated    CONSULTATIONS  Urology (Dr. Ric Toribio, Dr. Villegas, Dr. Mabry)    PROCEDURES  Performed 10/12/2020:  1. Cystoscopy, Left Ureteroscopy w/ stone basket extraction, JJ stent   2. Left retrograde pyelogram: with intraoperative interpretation    LABORATORY  Lab Results   Component Value Date    SODIUM 138 10/11/2020    POTASSIUM 3.8 10/11/2020    CHLORIDE 103 10/11/2020    CO2 21 10/11/2020    GLUCOSE 165 (H) 10/11/2020    BUN 17 10/11/2020    CREATININE 1.16 10/11/2020        Lab Results   Component Value Date    WBC 17.2 (H) 10/11/2020    HEMOGLOBIN 14.5 10/11/2020     HEMATOCRIT 44.0 10/11/2020    PLATELETCT 346 10/11/2020        Total time of the discharge process exceeds 33 minutes.

## 2020-10-16 LAB
APPEARANCE STONE: NORMAL
COMPN STONE: NORMAL
NUMBER STONE: 1
SIZE STONE: NORMAL MM
SPECIMEN WT: 48 MG

## 2020-10-31 ENCOUNTER — PRE-ADMISSION TESTING (OUTPATIENT)
Dept: ADMISSIONS | Facility: MEDICAL CENTER | Age: 45
End: 2020-10-31
Attending: SURGERY
Payer: OTHER MISCELLANEOUS

## 2020-10-31 DIAGNOSIS — Z01.812 PRE-OPERATIVE LABORATORY EXAMINATION: ICD-10-CM

## 2020-10-31 LAB — COVID ORDER STATUS COVID19: NORMAL

## 2020-10-31 PROCEDURE — U0003 INFECTIOUS AGENT DETECTION BY NUCLEIC ACID (DNA OR RNA); SEVERE ACUTE RESPIRATORY SYNDROME CORONAVIRUS 2 (SARS-COV-2) (CORONAVIRUS DISEASE [COVID-19]), AMPLIFIED PROBE TECHNIQUE, MAKING USE OF HIGH THROUGHPUT TECHNOLOGIES AS DESCRIBED BY CMS-2020-01-R: HCPCS

## 2020-10-31 PROCEDURE — C9803 HOPD COVID-19 SPEC COLLECT: HCPCS

## 2020-11-01 LAB
SARS-COV-2 RNA RESP QL NAA+PROBE: NOTDETECTED
SPECIMEN SOURCE: NORMAL

## 2020-11-02 SDOH — HEALTH STABILITY: MENTAL HEALTH: HOW OFTEN DO YOU HAVE A DRINK CONTAINING ALCOHOL?: MONTHLY OR LESS

## 2020-11-03 ENCOUNTER — ANESTHESIA EVENT (OUTPATIENT)
Dept: SURGERY | Facility: MEDICAL CENTER | Age: 45
End: 2020-11-03
Payer: OTHER MISCELLANEOUS

## 2020-11-03 ENCOUNTER — ANESTHESIA (OUTPATIENT)
Dept: SURGERY | Facility: MEDICAL CENTER | Age: 45
End: 2020-11-03
Payer: OTHER MISCELLANEOUS

## 2020-11-03 ENCOUNTER — HOSPITAL ENCOUNTER (OUTPATIENT)
Facility: MEDICAL CENTER | Age: 45
End: 2020-11-03
Attending: SURGERY | Admitting: SURGERY
Payer: OTHER MISCELLANEOUS

## 2020-11-03 VITALS
HEIGHT: 66 IN | DIASTOLIC BLOOD PRESSURE: 88 MMHG | OXYGEN SATURATION: 95 % | RESPIRATION RATE: 16 BRPM | TEMPERATURE: 98.1 F | SYSTOLIC BLOOD PRESSURE: 143 MMHG | BODY MASS INDEX: 31.25 KG/M2 | WEIGHT: 194.45 LBS | HEART RATE: 79 BPM

## 2020-11-03 DIAGNOSIS — G89.18 POST-OPERATIVE PAIN: ICD-10-CM

## 2020-11-03 PROCEDURE — 160036 HCHG PACU - EA ADDL 30 MINS PHASE I: Performed by: SURGERY

## 2020-11-03 PROCEDURE — 700111 HCHG RX REV CODE 636 W/ 250 OVERRIDE (IP)

## 2020-11-03 PROCEDURE — 700102 HCHG RX REV CODE 250 W/ 637 OVERRIDE(OP): Performed by: ANESTHESIOLOGY

## 2020-11-03 PROCEDURE — C1781 MESH (IMPLANTABLE): HCPCS | Performed by: SURGERY

## 2020-11-03 PROCEDURE — 160042 HCHG SURGERY MINUTES - EA ADDL 1 MIN LEVEL 5: Performed by: SURGERY

## 2020-11-03 PROCEDURE — 160031 HCHG SURGERY MINUTES - 1ST 30 MINS LEVEL 5: Performed by: SURGERY

## 2020-11-03 PROCEDURE — 160002 HCHG RECOVERY MINUTES (STAT): Performed by: SURGERY

## 2020-11-03 PROCEDURE — 700105 HCHG RX REV CODE 258: Performed by: SURGERY

## 2020-11-03 PROCEDURE — 700101 HCHG RX REV CODE 250: Performed by: SURGERY

## 2020-11-03 PROCEDURE — 501838 HCHG SUTURE GENERAL: Performed by: SURGERY

## 2020-11-03 PROCEDURE — 700101 HCHG RX REV CODE 250: Performed by: ANESTHESIOLOGY

## 2020-11-03 PROCEDURE — 700111 HCHG RX REV CODE 636 W/ 250 OVERRIDE (IP): Performed by: ANESTHESIOLOGY

## 2020-11-03 PROCEDURE — 160025 RECOVERY II MINUTES (STATS): Performed by: SURGERY

## 2020-11-03 PROCEDURE — 500868 HCHG NEEDLE, SURGI(VARES): Performed by: SURGERY

## 2020-11-03 PROCEDURE — 700102 HCHG RX REV CODE 250 W/ 637 OVERRIDE(OP): Performed by: SURGERY

## 2020-11-03 PROCEDURE — 160009 HCHG ANES TIME/MIN: Performed by: SURGERY

## 2020-11-03 PROCEDURE — 160046 HCHG PACU - 1ST 60 MINS PHASE II: Performed by: SURGERY

## 2020-11-03 PROCEDURE — 160048 HCHG OR STATISTICAL LEVEL 1-5: Performed by: SURGERY

## 2020-11-03 PROCEDURE — A9270 NON-COVERED ITEM OR SERVICE: HCPCS | Performed by: ANESTHESIOLOGY

## 2020-11-03 PROCEDURE — 160035 HCHG PACU - 1ST 60 MINS PHASE I: Performed by: SURGERY

## 2020-11-03 PROCEDURE — 500002 HCHG ADHESIVE, DERMABOND: Performed by: SURGERY

## 2020-11-03 PROCEDURE — A9270 NON-COVERED ITEM OR SERVICE: HCPCS | Performed by: SURGERY

## 2020-11-03 PROCEDURE — 502714 HCHG ROBOTIC SURGERY SERVICES: Performed by: SURGERY

## 2020-11-03 DEVICE — MESH PROGRIP LAPROSCOPIC SELF FIXATING (1/CA): Type: IMPLANTABLE DEVICE | Status: FUNCTIONAL

## 2020-11-03 RX ORDER — ONDANSETRON 2 MG/ML
4 INJECTION INTRAMUSCULAR; INTRAVENOUS
Status: DISCONTINUED | OUTPATIENT
Start: 2020-11-03 | End: 2020-11-03 | Stop reason: HOSPADM

## 2020-11-03 RX ORDER — OXYCODONE HCL 5 MG/5 ML
5 SOLUTION, ORAL ORAL
Status: COMPLETED | OUTPATIENT
Start: 2020-11-03 | End: 2020-11-03

## 2020-11-03 RX ORDER — HYDROMORPHONE HYDROCHLORIDE 1 MG/ML
0.1 INJECTION, SOLUTION INTRAMUSCULAR; INTRAVENOUS; SUBCUTANEOUS
Status: DISCONTINUED | OUTPATIENT
Start: 2020-11-03 | End: 2020-11-03 | Stop reason: HOSPADM

## 2020-11-03 RX ORDER — HYDROMORPHONE HYDROCHLORIDE 1 MG/ML
0.2 INJECTION, SOLUTION INTRAMUSCULAR; INTRAVENOUS; SUBCUTANEOUS
Status: DISCONTINUED | OUTPATIENT
Start: 2020-11-03 | End: 2020-11-03 | Stop reason: HOSPADM

## 2020-11-03 RX ORDER — SODIUM CHLORIDE, SODIUM LACTATE, POTASSIUM CHLORIDE, CALCIUM CHLORIDE 600; 310; 30; 20 MG/100ML; MG/100ML; MG/100ML; MG/100ML
INJECTION, SOLUTION INTRAVENOUS CONTINUOUS
Status: DISCONTINUED | OUTPATIENT
Start: 2020-11-03 | End: 2020-11-03 | Stop reason: HOSPADM

## 2020-11-03 RX ORDER — ONDANSETRON 2 MG/ML
INJECTION INTRAMUSCULAR; INTRAVENOUS PRN
Status: DISCONTINUED | OUTPATIENT
Start: 2020-11-03 | End: 2020-11-03 | Stop reason: SURG

## 2020-11-03 RX ORDER — HYDROMORPHONE HYDROCHLORIDE 1 MG/ML
0.4 INJECTION, SOLUTION INTRAMUSCULAR; INTRAVENOUS; SUBCUTANEOUS
Status: DISCONTINUED | OUTPATIENT
Start: 2020-11-03 | End: 2020-11-03 | Stop reason: HOSPADM

## 2020-11-03 RX ORDER — HYDRALAZINE HYDROCHLORIDE 20 MG/ML
5 INJECTION INTRAMUSCULAR; INTRAVENOUS
Status: DISCONTINUED | OUTPATIENT
Start: 2020-11-03 | End: 2020-11-03 | Stop reason: HOSPADM

## 2020-11-03 RX ORDER — DIPHENHYDRAMINE HYDROCHLORIDE 50 MG/ML
12.5 INJECTION INTRAMUSCULAR; INTRAVENOUS
Status: DISCONTINUED | OUTPATIENT
Start: 2020-11-03 | End: 2020-11-03 | Stop reason: HOSPADM

## 2020-11-03 RX ORDER — KETOROLAC TROMETHAMINE 30 MG/ML
INJECTION, SOLUTION INTRAMUSCULAR; INTRAVENOUS PRN
Status: DISCONTINUED | OUTPATIENT
Start: 2020-11-03 | End: 2020-11-03 | Stop reason: SURG

## 2020-11-03 RX ORDER — MEPERIDINE HYDROCHLORIDE 25 MG/ML
12.5 INJECTION INTRAMUSCULAR; INTRAVENOUS; SUBCUTANEOUS
Status: DISCONTINUED | OUTPATIENT
Start: 2020-11-03 | End: 2020-11-03 | Stop reason: HOSPADM

## 2020-11-03 RX ORDER — GABAPENTIN 300 MG/1
300 CAPSULE ORAL ONCE
Status: COMPLETED | OUTPATIENT
Start: 2020-11-03 | End: 2020-11-03

## 2020-11-03 RX ORDER — OXYCODONE HCL 5 MG/5 ML
10 SOLUTION, ORAL ORAL
Status: COMPLETED | OUTPATIENT
Start: 2020-11-03 | End: 2020-11-03

## 2020-11-03 RX ORDER — HALOPERIDOL 5 MG/ML
1 INJECTION INTRAMUSCULAR
Status: DISCONTINUED | OUTPATIENT
Start: 2020-11-03 | End: 2020-11-03 | Stop reason: HOSPADM

## 2020-11-03 RX ORDER — DEXAMETHASONE SODIUM PHOSPHATE 4 MG/ML
INJECTION, SOLUTION INTRA-ARTICULAR; INTRALESIONAL; INTRAMUSCULAR; INTRAVENOUS; SOFT TISSUE PRN
Status: DISCONTINUED | OUTPATIENT
Start: 2020-11-03 | End: 2020-11-03 | Stop reason: SURG

## 2020-11-03 RX ORDER — LABETALOL HYDROCHLORIDE 5 MG/ML
5 INJECTION, SOLUTION INTRAVENOUS
Status: DISCONTINUED | OUTPATIENT
Start: 2020-11-03 | End: 2020-11-03 | Stop reason: HOSPADM

## 2020-11-03 RX ORDER — CEFAZOLIN SODIUM 1 G/3ML
INJECTION, POWDER, FOR SOLUTION INTRAMUSCULAR; INTRAVENOUS PRN
Status: DISCONTINUED | OUTPATIENT
Start: 2020-11-03 | End: 2020-11-03 | Stop reason: SURG

## 2020-11-03 RX ORDER — SODIUM CHLORIDE, SODIUM LACTATE, POTASSIUM CHLORIDE, CALCIUM CHLORIDE 600; 310; 30; 20 MG/100ML; MG/100ML; MG/100ML; MG/100ML
INJECTION, SOLUTION INTRAVENOUS CONTINUOUS
Status: CANCELLED | OUTPATIENT
Start: 2020-11-03 | End: 2020-11-03

## 2020-11-03 RX ORDER — OXYCODONE HYDROCHLORIDE AND ACETAMINOPHEN 5; 325 MG/1; MG/1
1-2 TABLET ORAL EVERY 4 HOURS PRN
Qty: 30 TAB | Refills: 0 | Status: SHIPPED | OUTPATIENT
Start: 2020-11-03 | End: 2020-11-08

## 2020-11-03 RX ORDER — LIDOCAINE HYDROCHLORIDE 20 MG/ML
INJECTION, SOLUTION EPIDURAL; INFILTRATION; INTRACAUDAL; PERINEURAL PRN
Status: DISCONTINUED | OUTPATIENT
Start: 2020-11-03 | End: 2020-11-03 | Stop reason: SURG

## 2020-11-03 RX ORDER — METOCLOPRAMIDE HYDROCHLORIDE 5 MG/ML
INJECTION INTRAMUSCULAR; INTRAVENOUS PRN
Status: DISCONTINUED | OUTPATIENT
Start: 2020-11-03 | End: 2020-11-03 | Stop reason: SURG

## 2020-11-03 RX ORDER — BUPIVACAINE HYDROCHLORIDE AND EPINEPHRINE 5; 5 MG/ML; UG/ML
INJECTION, SOLUTION EPIDURAL; INTRACAUDAL; PERINEURAL
Status: DISCONTINUED | OUTPATIENT
Start: 2020-11-03 | End: 2020-11-03 | Stop reason: HOSPADM

## 2020-11-03 RX ORDER — ACETAMINOPHEN 500 MG
1000 TABLET ORAL ONCE
Status: COMPLETED | OUTPATIENT
Start: 2020-11-03 | End: 2020-11-03

## 2020-11-03 RX ADMIN — SODIUM CHLORIDE, POTASSIUM CHLORIDE, SODIUM LACTATE AND CALCIUM CHLORIDE: 600; 310; 30; 20 INJECTION, SOLUTION INTRAVENOUS at 13:16

## 2020-11-03 RX ADMIN — ROCURONIUM BROMIDE 50 MG: 10 INJECTION, SOLUTION INTRAVENOUS at 13:54

## 2020-11-03 RX ADMIN — ACETAMINOPHEN 1000 MG: 500 TABLET ORAL at 13:16

## 2020-11-03 RX ADMIN — FENTANYL CITRATE 50 MCG: 50 INJECTION, SOLUTION INTRAMUSCULAR; INTRAVENOUS at 15:05

## 2020-11-03 RX ADMIN — GABAPENTIN 300 MG: 300 CAPSULE ORAL at 13:16

## 2020-11-03 RX ADMIN — METOCLOPRAMIDE 10 MG: 5 INJECTION, SOLUTION INTRAMUSCULAR; INTRAVENOUS at 14:35

## 2020-11-03 RX ADMIN — PROPOFOL 200 MG: 10 INJECTION, EMULSION INTRAVENOUS at 13:54

## 2020-11-03 RX ADMIN — LIDOCAINE HYDROCHLORIDE 40 MG: 20 INJECTION, SOLUTION EPIDURAL; INFILTRATION; INTRACAUDAL at 13:54

## 2020-11-03 RX ADMIN — FENTANYL CITRATE 50 MCG: 50 INJECTION, SOLUTION INTRAMUSCULAR; INTRAVENOUS at 14:43

## 2020-11-03 RX ADMIN — SUGAMMADEX 200 MG: 100 INJECTION, SOLUTION INTRAVENOUS at 14:41

## 2020-11-03 RX ADMIN — DEXAMETHASONE SODIUM PHOSPHATE 4 MG: 4 INJECTION, SOLUTION INTRA-ARTICULAR; INTRALESIONAL; INTRAMUSCULAR; INTRAVENOUS; SOFT TISSUE at 13:54

## 2020-11-03 RX ADMIN — FENTANYL CITRATE 50 MCG: 50 INJECTION, SOLUTION INTRAMUSCULAR; INTRAVENOUS at 13:54

## 2020-11-03 RX ADMIN — CEFAZOLIN 2 G: 330 INJECTION, POWDER, FOR SOLUTION INTRAMUSCULAR; INTRAVENOUS at 13:54

## 2020-11-03 RX ADMIN — POVIDONE IODINE 15 ML: 100 SOLUTION TOPICAL at 13:16

## 2020-11-03 RX ADMIN — ROCURONIUM BROMIDE 10 MG: 10 INJECTION, SOLUTION INTRAVENOUS at 14:14

## 2020-11-03 RX ADMIN — ONDANSETRON 4 MG: 2 INJECTION INTRAMUSCULAR; INTRAVENOUS at 14:35

## 2020-11-03 RX ADMIN — KETOROLAC TROMETHAMINE 30 MG: 30 INJECTION, SOLUTION INTRAMUSCULAR at 14:35

## 2020-11-03 RX ADMIN — OXYCODONE HYDROCHLORIDE 5 MG: 5 SOLUTION ORAL at 15:31

## 2020-11-03 ASSESSMENT — PAIN DESCRIPTION - PAIN TYPE
TYPE: ACUTE PAIN;SURGICAL PAIN
TYPE: SURGICAL PAIN

## 2020-11-03 ASSESSMENT — FIBROSIS 4 INDEX: FIB4 SCORE: 0.64

## 2020-11-03 NOTE — ANESTHESIA PROCEDURE NOTES
Airway    Date/Time: 11/3/2020 1:55 PM  Performed by: Ian Morejon M.D.  Authorized by: Ian Morejon M.D.     Location:  OR  Urgency:  Elective  Difficult Airway: No    Indications for Airway Management:  Anesthesia      Spontaneous Ventilation: absent    Sedation Level:  Deep  Preoxygenated: Yes    Patient Position:  Sniffing  Final Airway Type:  Endotracheal airway  Final Endotracheal Airway:  ETT  Cuffed: Yes    Technique Used for Successful ETT Placement:  Direct laryngoscopy  Devices/Methods Used in Placement:  Cricoid pressure    Insertion Site:  Oral  Blade Type:  Shelli  Laryngoscope Blade/Videolaryngoscope Blade Size:  4  ETT Size (mm):  7.5  Measured from:  Teeth  ETT to Teeth (cm):  23  Placement Verified by: auscultation and capnometry    Cormack-Lehane Classification:  Grade IIa - partial view of glottis  Number of Attempts at Approach:  1

## 2020-11-03 NOTE — OR NURSING
Report given to Kathi CAMPA, pt transported with nurse to room 30 phase II, pt is alert and oriented at this time.

## 2020-11-03 NOTE — ANESTHESIA PREPROCEDURE EVALUATION
Umbilical and Ventral Hernia    Relevant Problems   CARDIAC   (+) HTN (hypertension)       Physical Exam    Airway   Mallampati: II  TM distance: <3 FB  Neck ROM: full       Cardiovascular - normal exam  Rhythm: regular  Rate: normal  (-) murmur     Dental - normal exam           Pulmonary - normal exam  Breath sounds clear to auscultation     Abdominal    Neurological - normal exam                 Anesthesia Plan    ASA 2       Plan - general       Airway plan will be ETT        Induction: intravenous      Pertinent diagnostic labs and testing reviewed    Informed Consent:    Anesthetic plan and risks discussed with patient.      Professional translation service used to obtain informed consent.

## 2020-11-03 NOTE — ANESTHESIA TIME REPORT
Anesthesia Start and Stop Event Times     Date Time Event    11/3/2020 1348 Ready for Procedure     1350 Anesthesia Start     1449 Anesthesia Stop        Responsible Staff  11/03/20    Name Role Begin End    Ian Morejon M.D. Anesth 1350 1449        Preop Diagnosis (Free Text):  Pre-op Diagnosis     UMBILICAL HERNIA        Preop Diagnosis (Codes):    Post op Diagnosis  Umbilical hernia      Premium Reason  Non-Premium    Comments:

## 2020-11-03 NOTE — OR NURSING
Family member notified pt is in recovery at this time, pt spoke with daughter briefly.  Pt at this time is alert and oriented, verbalized understanding of poc and has no questions at this time.   utilized during recovery.

## 2020-11-04 NOTE — DISCHARGE INSTRUCTIONS
Reparación laparoscópica de shahrzad hernia ventral  Laparoscopic Ventral Hernia Repair  La reparación laparoscópica de shahrzad hernia ventrales un procedimiento para arreglar shahrzad protuberancia de tejido que protruye a través de shahrzad christine débil del músculo en el abdomen (hernia ventral). Shahrzad hernia ventral puede ubicarse en el ombligo (umbilical), por encima del ombligo (epigástrica) o en el lugar de la incisión de shahrzad cirugía abdominal previa (hernia incisional). Se le puede realizar will procedimiento kip cirugía de emergencia si parte del intestino queda atrapado dentro de la hernia y comienza a perder briggs suministro de ekta (estrangulación).  La cirugía laparoscópica se hace a través de pequeñas incisiones usando un telescopio quirúrgico mott que tiene shahrzad mell y shahrzad cámara en el extremo (laparoscopio). Pedro la cirugía, el cirujano usará las imágenes del laparoscopio para guiar el procedimiento. Se colocará shahrzad melinda sobre la hernia para cerrar la abertura y fortalecer la pared abdominal.  Informe al médico acerca de lo siguiente:  · Cualquier alergia que tenga.  · Todos los medicamentos que utiliza, incluidos vitaminas, hierbas, gotas oftálmicas, cremas y medicamentos de venta mayela.  · Cualquier problema previo que usted o algún miembro de briggs rebecca haya tenido con los anestésicos.  · Cualquier enfermedad de la ekta que tenga.  · Cirugías previas a las que se sometió.  · Cualquier afección médica que tenga.  · Si está embarazada o podría estarlo.  ¿Cuáles son los riesgos?  En general, se trata de un procedimiento seguro. Sin embargo, pueden ocurrir complicaciones, por ejemplo:  · Infección.  · Hemorragia.  · Reacciones alérgicas a los medicamentos.  · Daños a otras estructuras u otros órganos del abdomen.  · Dificultad para orinar o defecar después de la cirugía.  · Neumonía.  · Coágulos de ekta.  · La hernia regresa luego de la cirugía.  · Acumulación de líquido en la christine de la hernia.  En algunos casos,  el médico puede tener que cambiar de un procedimiento laparoscópico a un procedimiento que se realiza a través de shahrzad única incisión más neema en el abdomen (procedimiento abierto). El procedimiento abierto puede ser necesario en los siguientes casos:  · Tiene shahrzad hernia difícil de reparar.  · Aster órganos son difíciles de chetna.  · Tiene problemas de sangrado joanna el procedimiento laparoscópico.  ¿Qué ocurre antes del procedimiento?  Mantenerse hidratado  Siga las indicaciones del médico acerca de mantenerse hidratado, las cuales pueden incluir lo siguiente:  · Hasta 2 horas antes del procedimiento, puede beber líquidos transparentes, kip agua, jugos frutales transparentes, café jose angel y té solo.  Restricciones en las comidas y bebidas  Siga las indicaciones del médico respecto de las restricciones de comidas o bebidas, las cuales pueden incluir lo siguiente:  · Ocho horas antes del procedimiento, deje de ingerir comidas o alimentos pesados, por ejemplo, carne, alimentos fritos o alimentos grasos.  · Seis horas antes del procedimiento, deje de ingerir comidas o alimentos livianos, kip tostadas o cereales.  · Seis horas antes del procedimiento, deje de beber leche o bebidas que contengan leche.  · Dos horas antes del procedimiento, deje de beber líquidos transparentes.  Medicamentos  · Consulte al médico sobre:  ? Cambiar o suspender los medicamentos que deirdre habitualmente. [place] es muy importante si deirdre medicamentos para la diabetes o anticoagulantes.  ? Perez medicamentos kip aspirina e ibuprofeno. Estos medicamentos pueden tener un efecto anticoagulante en la ekta. No tome estos medicamentos antes del procedimiento si briggs médico le indica que no lo piotr.  · Pueden indicarle un antibiótico para ayudar a prevenir infecciones.  Instrucciones generales    · Se le puede pedir que tome un laxante o que se piotr un enema para vaciar los intestinos antes de la cirugía (preparación del intestino).  · No consuma ningún  producto que contenga nicotina o tabaco, kip cigarrillos y cigarrillos electrónicos. Si necesita ayuda para dejar de fumar, consulte al médico.  · Es posible que tenga que hacerse estudios antes del procedimiento, por ejemplo:  ? Análisis de ekta.  ? Análisis de orina.  ? Ecografía abdominal.  ? Radiografía de tórax.  ? Electrocardiograma (ECG).  · [place]dale a alguien que lo lleve a briggs casa desde el hospital o la clínica.  · Si se irá a briggs casa inmediatamente después del procedimiento, planifique que alguien se quede con usted joanna 24 horas.  ¿Qué ocurre joanna el procedimiento?  · Para disminuir el riesgo de contraer shahrzad infección:  ? El equipo médico se lavará o se desinfectará las tom.  ? Le lavarán la piel con jabón.  · Le colocarán un tubo (catéter) intravenoso en shahrzad de las venas.  · Le administrarán sadie o más de los siguientes medicamentos:  ? Un medicamento para ayudarlo a relajarse (sedante).  ? Un medicamento para hacerlo dormir (anestesia general).  · Le realizarán shahrzad pequeña incisión en el abdomen. Se introducirá un tubo hueco de metal (trocar) a través de la incisión.  · Se colocará un tubo a través del trocar para inflar el abdomen con un gas similar al aire. [place] le permite al cirujano chetna con mayor facilidad el interior del abdomen y hacer reparaciones.  · Se introducirá un laparoscopio en el abdomen a través del trocar. El laparoscopio enviará imágenes a un monitor del quirófano.  · Se introducirán otros trocares a través de las otras pequeñas incisiones del abdomen. Los instrumentos quirúrgicos necesarios para el procedimiento se colocarán a través de estos trocares.  · Los tejidos o el intestino que conforman la hernia se colocarán nuevamente en briggs posición normal.  · Los bordes de la hernia se pueden coser juntos.  · Se usará shahrzad melinda para cerrar la hernia. Se usarán puntos (suturas), clips o grapas para mantener la melinda en briggs lugar.  · Se colocará shahrzad venda (vendaje) o goma para cerrar  la piel sobre las incisiones.  [place] procedimiento puede variar según el médico y el hospital.  ¿Qué ocurre después del procedimiento?  · Le controlarán la presión arterial, la frecuencia cardíaca, la frecuencia respiratoria y el nivel de oxígeno en la ekta hasta que desaparezca el efecto de los medicamentos administrados.  · Seguirá recibiendo líquidos y medicamentos a través de un tubo (catéter) intravenoso. Le retirarán el tubo intravenoso cuando pueda beber líquidos transparentes.  · Le darán analgésicos si los necesita.  · Lo alentarán a levantarse y caminar lo más pronto posible.  · Quizás deba usar medias de compresión. Estas medias ayudan a evitar la formación de coágulos de ekta y a reducir la hinchazón de las piernas.  · Le mostrarán cómo hacer ejercicios de respiración profunda para evitar shahrzad infección pulmonar.  · No conduzca joanna 24 horas si le administraron un sedante.  Esta información no tiene kip fin reemplazar el consejo del médico. Asegúrese de hacerle al médico cualquier pregunta que tenga.  Document Released: 12/04/2013 Document Revised: 11/07/2018 Document Reviewed: 10/09/2013  [employer] Patient Education © 2020 [employer]    ACTIVITY: Rest and take it easy for the first 24 hours.  A responsible adult is recommended to remain with you during that time.  It is normal to feel sleepy.  We encourage you to not do anything that requires balance, judgment or coordination.    MILD FLU-LIKE SYMPTOMS ARE NORMAL. YOU MAY EXPERIENCE GENERALIZED MUSCLE ACHES, THROAT IRRITATION, HEADACHE AND/OR SOME NAUSEA.    FOR 24 HOURS DO NOT:  Drive, operate machinery or run household appliances.  Drink beer or alcoholic beverages.   Make important decisions or sign legal documents.    SPECIAL INSTRUCTIONS:   Normal daily activity is ok, no strenuous activity or heavy lifting until cleared by your doctor.   Notify MD for any signs of infection, uncontrolled pain or nausea  Call MD office for follow up  appointment.    DIET: To avoid nausea, slowly advance diet as tolerated, avoiding spicy or greasy foods for the first day.  Add more substantial food to your diet according to your physician's instructions.   INCREASE FLUIDS AND FIBER TO AVOID CONSTIPATION.    SURGICAL DRESSING/BATHING: Keep site clean dry and intact. Ok to shower. Do not soak in hot tub/bath until cleared by MD. Do not pull off dermabond (surgical glue), allow it to peel off on it's own.    FOLLOW-UP APPOINTMENT:  A follow-up appointment should be arranged with your doctor in 1-2 weeks; call to schedule.    You should CALL YOUR PHYSICIAN if you develop:  Fever greater than 101 degrees F.  Pain not relieved by medication, or persistent nausea or vomiting.  Excessive bleeding (blood soaking through dressing) or unexpected drainage from the wound.  Extreme redness or swelling around the incision site, drainage of pus or foul smelling drainage.  Inability to urinate or empty your bladder within 8 hours.  Problems with breathing or chest pain.    You should call 911 if you develop problems with breathing or chest pain.  If you are unable to contact your doctor or surgical center, you should go to the nearest emergency room or urgent care center.    Physician's telephone #: [phone] Dr Hlal    If any questions arise, call your doctor.  If your doctor is not available, please feel free to call the Surgical Center at [phone]. The Contact Center is open Monday through Friday 7AM to 5PM and may speak to a nurse at [phone], or toll free at [phone].     A registered nurse may call you a few days after your surgery to see how you are doing after your procedure.    MEDICATIONS: Resume taking daily medication.  Take prescribed pain medication with food.  If no medication is prescribed, you may take non-aspirin pain medication if needed.  PAIN MEDICATION CAN BE VERY CONSTIPATING.  Take a stool softener or laxative such as senokot,  pericolace, or milk of magnesia if needed.    Prescription given for Percocet for pain.  Last pain medication given at 3:30pm (5mg oxycodone).    If your physician has prescribed pain medication that includes Acetaminophen (Tylenol), do not take additional Acetaminophen (Tylenol) while taking the prescribed medication.    Depression / Suicide Risk    As you are discharged from this [facility] facility, it is important to learn how to keep safe from harming yourself.    Recognize the warning signs:  · Abrupt changes in personality, positive or negative- including increase in energy   · Giving away possessions  · Change in eating patterns- significant weight changes-  positive or negative  · Change in sleeping patterns- unable to sleep or sleeping all the time   · Unwillingness or inability to communicate  · Depression  · Unusual sadness, discouragement and loneliness  · Talk of wanting to die  · Neglect of personal appearance   · Rebelliousness- reckless behavior  · Withdrawal from people/activities they love  · Confusion- inability to concentrate     If you or a loved one observes any of these behaviors or has concerns about self-harm, here's what you can do:  · Talk about it- your feelings and reasons for harming yourself  · Remove any means that you might use to hurt yourself (examples: pills, rope, extension cords, firearm)  · Get professional help from the community (Mental Health, Substance Abuse, psychological counseling)  · Do not be alone:Call your Safe Contact- someone whom you trust who will be there for you.  · Call your local CRISIS HOTLINE 705-5732 or 553-194-5544  · Call your local Children's Mobile Crisis Response Team [place] (483) 749-8344 or www.HealthDataInsights  · Call the toll free National Suicide Prevention Hotlines   · National Suicide Prevention Lifeline 543-012-JDWD (8981)  National Hope Line Network 800-SUICIDE (149-3049)Instrucciones Para [place]    (Home Care  Instructions)    ACTIVIDAD: Descanse y tome todo con mucha calma las primeras 24 horas después de briggs cirugía.  Shahrzad persona adulta responsable debe permanecer con usted joanna vonda periodo de tiempo.  Es normal sentirse sonoliento o sonolienta joanna esas primeras horas.  Le recomendamos que no dariana nada que requiera equilibrio, perez decisiones a mucha coordinación de briggs parte.    NO DARIANA ESTO PURANTE LAS PRIMERAS 24 HORAS:   Manejar o conducir algún vehiculo, operar maquinarias o utilizar electrodomesticos.   Beber cerveza o algún otro tipo de bebida alcohólica.   Perez decisiones importantes o firmar documentos legales.    INSTRUCCIONES ESPECIALES:   La actividad diaria normal está abelardo, ninguna actividad extenuante ni levantar objetos pesados ??hasta que briggs médico lo autorice.  Notifique al médico de cualquier signo de infección, dolor incontrolado o náuseas  Llame a la oficina de MD para shahrzad luana de seguimiento.        DIETA: Para evitar las nauseas, prosiga despacito con briggs dieta a medida que pueda ir tolerándola mejor, evite comidas muy condimentadas o grasosas joanna vonda primer día.  Vaya agregando comidas más substanciadas a briggs dieta a medida que asi lo indique briggs médica.  Los bebés pueden beber leche preparada o formula, ásl yolis también leche del seno de la madre a medida que vayan teniendo hambre.  SIGA AGREGANDO LIQUIDOS Y COMIDAS CON FIBRA PARA EVITAR ESTREÑIMIENTO.    YOLIS BAÑARSE Y CAMBIAR LOS VENDAJES DE LA CIRUGIA: Mantenga el sitio limpio, seco e intacto. Ok para ducharse. No se sumerja en el jacuzzi / baño hasta que lo autorice un médico. No quite el dermabond (pegamento quirúrgico), deje que se despegue por sí solo.    MEDICAMENTOS/MEDICINAS:  Vuelva a perez wilfredo medicamentos diarios.  [place] los medicamentos que se le prescribe con un poco de comida.  Si no le prescribe ningún tipo de medicamento, entonces puede perez medicinas para el dolor que no contienen aspirina, si las necesita.  LAS  MEDICINAS PARA EL DOLOR PUEDEN ESTREÑIRLE MUCHO.  [place] un suavizante para el excremento o materia fecal (stool softener) o un laxativo kip por ejemplo: senokot, pericolase, o leche de magnesia, si lo necesita.    La prescripción la administro Percocet for delore.  La ultima sosis de medicina para el dolor fue administrada 3:30pm.     Se debe hacer shahrzad consulta medica con el doctor en 102 weeks, Líame para hacer la luana.    Usted debe LIAMAR A BRIGGS MEDICO si tiene los siguientes síntomas:   -   Shahrzad fiebre más alber de 101 grados Fahrenheit.   -   Un dolor incesante aún con los medicamentos, o nauseas y vómito persistente.   -   Un sangrado excesivo (ekta que traspasa los vendajes o gasas) o algúln tipo de drenaje inesperado que proviene de la henda.     -   Un color lei exagerado o hinchazón alrededor del área en donde se le hizo incisión o trang, o un drenaje de pus o con olor cristian proveniente de la henda.   -    La inhabilidad de orinar o vaciar briggs vejiga en 8 horas.   -    Problemas con a respiración o ramy en el pecho.    Usted debe llamar al 911 si se presentan problemas con el dolor al respirar o el pecho.  Si no se puede ponnoer en comunicación con un medica o con el centro de cirugía, usted debe ir a la estación de emergencia (emergency room) más cercana o a un centro de atención de urgencia (urgent care center).  El teléfono del medico es: [phone] Dr Hall    LOS SÍNTOMAS DE UN LEVE RESFRIO SON MUY NORMALES.  ADEMÁS USTED PUEDE LLEGAR A SENTIR RAMY GENERALES DE MÚSCULOS, IRRITACIÓN EN LA GARGANTA, RAMY DE ARABELLA Y/O UN POCO DE NAUSEAS.    Sie tiene alguna pregunta, llame a briggs médico.  Si briggs médico no se encuentra disponible, por favor llame al Centro de Cirugía at [phone].  el Centro está abierto de Lunes a Viernes desde las 7:00 de la manana hasta las 5:00 de la noche.  Usted también puede llamar al CENTRO DE LLAMADAS SOBRE LA COLT o HEALTH HOTLINE.  [place] está abierto viente y  cuatro horas por janine, siete vyas por semana, allí podrá hablar con shahrzad enfermera.  Llame al [phone], o al número garland 1 [phone].    Mi firma a continuación indica que he recibido y entiendco estas instrucciones acera de los cuidados en la casa (Home Care Instructions)    · Usted recibirá shahrzad encuesta en la correspondencia en las siguientes semanas y le pedimos que por favor tome un momento para completar vickie encuesta y regresaría a hosotros.  Nuestro objetivó es brindarle un cuidado muy enriquez y par lo tanto apreciamos wilfredo coméntanos.  Muchas chano por sterling escogido el Centro de Cirugía de [facility].

## 2020-11-04 NOTE — OP REPORT
DATE OF SERVICE:  11/03/2020    SURGEON:  Tamir Hall MD    ASSISTANT:  Mason Toure MD    PREOPERATIVE DIAGNOSIS:  Umbilical hernia.    POSTOPERATIVE DIAGNOSIS:  Umbilical hernia.    PROCEDURE PERFORMED:  Robotic repair of umbilical hernia with mesh.    ANESTHESIA:  General endotracheal anesthesia.    ANESTHESIOLOGIST:  Ian Morejon MD    INDICATIONS:  A 44-year-old man with a symptomatic relatively large umbilical   hernia.  Repair is indicated.  Due to his body habitus and the size of the   hernia, I felt that a robotic repair would be most appropriate.    DESCRIPTION OF PROCEDURE:  Procedure was discussed in detail with the patient   including the use of surgical robot, potentially converting to an open   procedure, associated risk of bleeding, infection, abscess, hematoma.  I also   discussed the use of mesh, and risk of recurrence as well as the risk of   injury to intraperitoneal organs.  He understood all of the above and wished   to proceed.  He was placed under anesthesia by Dr. Morejon.  His abdomen was   prepped and draped with chlorhexidine prep and sterile drapes.  After a   timeout, a left subcostal incision was made and through this incision, a   Veress needle was placed.  Low pressure was confirmed and CO2 insufflation was   used to achieve a pneumoperitoneum of 15 mmHg.  Through this same incision,   an 8 mm port was placed and the laparoscope was inserted.  Under direct   visualization, left-sided port and a left lower quadrant port were placed.    Robot was docked and instruments were placed.  The hernia was readily evident.    A preperitoneal pocket was created by dividing the peritoneum and creating a   space.  Once this pocket had been created, it was measured and noted to be   about 11x7 cm.  The hernia defect itself was then approximated with a   Stratafix suture.  A ProGrip mesh was cut to the appropriate size and placed   in the pocket above the peritoneum.  The peritoneum was  then reapproximated   using a running Stratafix suture which also incorporated the mesh with   multiple throws to ensure that it did not slide.  Once this had been   accomplished, the needles that were used were removed.  The mesh was noted to   be nicely covered in the preperitoneal pocket.  The ports were removed.    Pneumoperitoneum was released.  The skin on all incisions was approximated   with 4-0 Vicryl sutures.  Dermabond was placed.  The patient tolerated the   procedure well without apparent complication.  Final counts reported as   Correct. Wound class is class .       ____________________________________     MD JEAN-PIERRE EVANS / NOÉ    DD:  11/03/2020 14:45:37  DT:  11/03/2020 16:13:01    D#:  [id number]  Job#:  [id number]

## 2020-11-04 NOTE — ANESTHESIA POSTPROCEDURE EVALUATION
Patient: Herrera Barros    Procedure Summary     Date: 11/03/20 Room / Location: [facility] OR 08 / SURGERY [facility]    Anesthesia Start: 1350 Anesthesia Stop: 1449    Procedure: REPAIR, HERNIA, VENTRAL, ROBOT-ASSISTED, USING DA FERNIE XI- UMBILICAL WITH MESH (Abdomen) Diagnosis: (UMBILICAL HERNIA)    Surgeons: Tamir Hall M.D. Responsible Provider: Ian Morejon M.D.    Anesthesia Type: general ASA Status: 2          Final Anesthesia Type: general  Last vitals  BP   Blood Pressure: 147/82    Temp   36.7 °C (98.1 °F)    Pulse   Pulse: 67   Resp   16    SpO2   95 %      Anesthesia Post Evaluation    Patient location during evaluation: PACU  Patient participation: complete - patient participated  Level of consciousness: awake and alert    Airway patency: patent  Anesthetic complications: no  Cardiovascular status: hemodynamically stable  Respiratory status: acceptable  Hydration status: euvolemic    PONV: none           Nurse Pain Score: 2 (NPRS)

## 2020-11-04 NOTE — PROGRESS NOTES
Pt VSS, pain controlled, tolerating po intake. Pt's daughter given discharge education/instructions over the phone, all questions answered. IV discontinued, site wnl. Surgical lapsites x3 with DB, cdi, wnl. Ice pack to site. Pt discharged home in stable condition with all belongings and script.

## 2021-01-17 ENCOUNTER — HOSPITAL ENCOUNTER (OUTPATIENT)
Facility: MEDICAL CENTER | Age: 46
End: 2021-01-17
Attending: STUDENT IN AN ORGANIZED HEALTH CARE EDUCATION/TRAINING PROGRAM
Payer: OTHER MISCELLANEOUS

## 2021-01-17 ENCOUNTER — OFFICE VISIT (OUTPATIENT)
Dept: URGENT CARE | Facility: CLINIC | Age: 46
End: 2021-01-17
Payer: OTHER MISCELLANEOUS

## 2021-01-17 VITALS
WEIGHT: 194 LBS | SYSTOLIC BLOOD PRESSURE: 126 MMHG | TEMPERATURE: 98.2 F | HEIGHT: 66 IN | DIASTOLIC BLOOD PRESSURE: 82 MMHG | HEART RATE: 104 BPM | RESPIRATION RATE: 16 BRPM | BODY MASS INDEX: 31.18 KG/M2 | OXYGEN SATURATION: 93 %

## 2021-01-17 DIAGNOSIS — Z20.822 COVID-19 RULED OUT: ICD-10-CM

## 2021-01-17 DIAGNOSIS — J06.9 VIRAL URI WITH COUGH: ICD-10-CM

## 2021-01-17 DIAGNOSIS — Z71.89 COUNSELED ABOUT COVID-19 VIRUS INFECTION: ICD-10-CM

## 2021-01-17 PROCEDURE — U0003 INFECTIOUS AGENT DETECTION BY NUCLEIC ACID (DNA OR RNA); SEVERE ACUTE RESPIRATORY SYNDROME CORONAVIRUS 2 (SARS-COV-2) (CORONAVIRUS DISEASE [COVID-19]), AMPLIFIED PROBE TECHNIQUE, MAKING USE OF HIGH THROUGHPUT TECHNOLOGIES AS DESCRIBED BY CMS-2020-01-R: HCPCS

## 2021-01-17 PROCEDURE — U0005 INFEC AGEN DETEC AMPLI PROBE: HCPCS

## 2021-01-17 PROCEDURE — 99204 OFFICE O/P NEW MOD 45 MIN: CPT | Performed by: STUDENT IN AN ORGANIZED HEALTH CARE EDUCATION/TRAINING PROGRAM

## 2021-01-17 RX ORDER — OXYCODONE HYDROCHLORIDE 5 MG/1
TABLET ORAL
COMMUNITY
End: 2021-01-22

## 2021-01-17 RX ORDER — BENZONATATE 100 MG/1
200 CAPSULE ORAL 3 TIMES DAILY PRN
Qty: 60 CAP | Refills: 0 | Status: ON HOLD | OUTPATIENT
Start: 2021-01-17 | End: 2021-02-01 | Stop reason: SDUPTHER

## 2021-01-17 ASSESSMENT — FIBROSIS 4 INDEX: FIB4 SCORE: 0.65

## 2021-01-18 DIAGNOSIS — Z71.89 COUNSELED ABOUT COVID-19 VIRUS INFECTION: ICD-10-CM

## 2021-01-18 DIAGNOSIS — J06.9 VIRAL URI WITH COUGH: ICD-10-CM

## 2021-01-18 DIAGNOSIS — Z20.822 COVID-19 RULED OUT: ICD-10-CM

## 2021-01-18 NOTE — PROGRESS NOTES
Subjective:   CHIEF COMPLAINT  Chief Complaint   Patient presents with   • Cough     x4 days, shortness of breath, loss of taste        HPI  Herrera Barros is a 45 y.o. male who presents with a chief complaint of dry cough, headache and loss of sense of taste for approximately 5 days.  The patient was accompanied by his daughter who translated (he does not speak English).  The patient recently returned from [place] which he was visiting for the last month.  The daughter reports he has tried taking DayQuil which has provided nominal relief of symptoms.  No additional modifying factors.  Symptoms are during the day and night.  He denies any associated symptoms of wheezing, chest pain, shortness of breath.    REVIEW OF SYSTEMS  General: no fever or chills  Skin: no rashes or new lesions  Eyes: no blurry vision or pain  ENT: Admits congestion, runny nose  Cardiovascular: no chest pain or palpitations   Resp: Admits cough.  No SOB  GI: no nausea or vomiting  Neuro: numbness or tingling   Endo: no heat/cold intolerance or excessive thirst  Psych: no anxiety or depression  See HPI for further details.    PAST MEDICAL HISTORY  Patient Active Problem List    Diagnosis Date Noted   • Preoperative cardiovascular examination 10/12/2020     Priority: High   • Leukocytosis 10/12/2020     Priority: Medium   • Elevated glucose 10/12/2020     Priority: Low   • Nephrolithiasis 10/12/2020   • HTN (hypertension) 10/12/2020   • Renal insufficiency 10/12/2020       SURGICAL HISTORY   has a past surgical history that includes other orthopedic surgery (Left, 2001); cystoscopy,insert ureteral stent (10/12/2020); cysto/uretero/pyeloscopy, dx (10/12/2020); and ventral hernia repair robotic xi (11/3/2020).    ALLERGIES  No Known Allergies    CURRENT MEDICATIONS  Home Medications     Reviewed by Derick Champagne Ass't (Medical Assistant) on 01/17/21 at 2105  Med List Status: <None>   Medication Last Dose Status   losartan  "(COZAAR) 50 MG Tab Taking Active   ondansetron (ZOFRAN ODT) 4 MG TABLET DISPERSIBLE Not Taking Active   oxybutynin (DITROPAN) 5 MG Tab Not Taking Active   oxyCODONE immediate-release (ROXICODONE) 5 MG Tab Not Taking Active   tamsulosin (FLOMAX) 0.4 MG capsule Not Taking Active                SOCIAL HISTORY  Social History     Tobacco Use   • Smoking status: Never Smoker   • Smokeless tobacco: Never Used   Substance and Sexual Activity   • Alcohol use: Yes     Frequency: Monthly or less     Comment: 1 per month   • Drug use: No   • Sexual activity: Not on file       FAMILY HISTORY  No family history on file.       Objective:   PHYSICAL EXAM  VITAL SIGNS: /82 (Patient Position: Sitting)   Pulse (!) 104   Temp 36.8 °C (98.2 °F) (Temporal)   Resp 16   Ht 1.676 m (5' 6\")   Wt 88 kg (194 lb)   SpO2 93%   BMI 31.31 kg/m²     Gen: no acute distress, normal voice  Skin: dry, intact, moist mucosal membranes  Eyes: no conjunctival infection b/l  ENT: no cervical lymphadenopathy   Neck: no meningeal signs, full ROM  Lungs: CTAB w/ symmetric expansion  CV: RRR w/o murmurs or clicks  Psych: normal affect, normal judgement, alert, awake    Assessment/Plan:     1. Viral URI with cough  benzonatate (TESSALON) 100 MG Cap    COVID/SARS CoV-2 PCR   2. COVID-19 ruled out  COVID/SARS CoV-2 PCR   3. Counseled about COVID-19 virus infection  COVID/SARS CoV-2 PCR   Signs and symptoms are consistent with a viral upper respiratory tract infection.  Patient recently returned from [place] which he was visiting for the last month.  Encounter was interpreted by his daughter.  Oxygen did not drop below 90% with ambulation.  Exam was otherwise unremarkable.  -Rx sent for Tessalon  -Ordered Covid.  Results will be sent through [employer].  -Instructed to quarantine until Covid results have been returned  -Encouraged hydration and symptomatic treatment with Tylenol/ibuprofen prn  -Pt was in full understanding and agreement with the " plan.    Differential diagnosis, natural history, supportive care, and indications for immediate follow-up discussed. All questions answered. Patient agrees with the plan of care.    Follow-up as needed if symptoms worsen or fail to improve to PCP, Urgent care or Emergency Room.    Please note that this dictation was created using voice recognition software. I have made a reasonable attempt to correct obvious errors, but I expect that there are errors of grammar and possibly content that I did not discover before finalizing the note.

## 2021-01-19 LAB
COVID ORDER STATUS COVID19: NORMAL
SARS-COV-2 RNA RESP QL NAA+PROBE: DETECTED
SPECIMEN SOURCE: ABNORMAL

## 2021-01-22 ENCOUNTER — APPOINTMENT (OUTPATIENT)
Dept: RADIOLOGY | Facility: MEDICAL CENTER | Age: 46
DRG: 853 | End: 2021-01-22
Attending: EMERGENCY MEDICINE
Payer: OTHER MISCELLANEOUS

## 2021-01-22 ENCOUNTER — HOSPITAL ENCOUNTER (INPATIENT)
Facility: MEDICAL CENTER | Age: 46
LOS: 10 days | DRG: 853 | End: 2021-02-01
Attending: EMERGENCY MEDICINE | Admitting: STUDENT IN AN ORGANIZED HEALTH CARE EDUCATION/TRAINING PROGRAM
Payer: OTHER MISCELLANEOUS

## 2021-01-22 DIAGNOSIS — J96.01 ACUTE HYPOXEMIC RESPIRATORY FAILURE DUE TO COVID-19 (HCC): ICD-10-CM

## 2021-01-22 DIAGNOSIS — J06.9 VIRAL URI WITH COUGH: ICD-10-CM

## 2021-01-22 DIAGNOSIS — I65.21 THROMBOSIS OF RIGHT COMMON CAROTID ARTERY: ICD-10-CM

## 2021-01-22 DIAGNOSIS — U07.1 ACUTE HYPOXEMIC RESPIRATORY FAILURE DUE TO COVID-19 (HCC): ICD-10-CM

## 2021-01-22 DIAGNOSIS — U07.1 PNEUMONIA DUE TO COVID-19 VIRUS: ICD-10-CM

## 2021-01-22 DIAGNOSIS — R09.02 HYPOXEMIA: ICD-10-CM

## 2021-01-22 DIAGNOSIS — J12.82 PNEUMONIA DUE TO COVID-19 VIRUS: ICD-10-CM

## 2021-01-22 LAB
ALBUMIN SERPL BCP-MCNC: 3.5 G/DL (ref 3.2–4.9)
ALBUMIN/GLOB SERPL: 0.8 G/DL
ALP SERPL-CCNC: 57 U/L (ref 30–99)
ALT SERPL-CCNC: 70 U/L (ref 2–50)
ANION GAP SERPL CALC-SCNC: 12 MMOL/L (ref 7–16)
AST SERPL-CCNC: 63 U/L (ref 12–45)
BASE EXCESS BLDA CALC-SCNC: -1 MMOL/L (ref -4–3)
BASOPHILS # BLD AUTO: 0.2 % (ref 0–1.8)
BASOPHILS # BLD: 0.02 K/UL (ref 0–0.12)
BILIRUB SERPL-MCNC: 0.2 MG/DL (ref 0.1–1.5)
BODY TEMPERATURE: ABNORMAL CENTIGRADE
BUN SERPL-MCNC: 9 MG/DL (ref 8–22)
CALCIUM SERPL-MCNC: 8.7 MG/DL (ref 8.5–10.5)
CHLORIDE SERPL-SCNC: 100 MMOL/L (ref 96–112)
CO2 SERPL-SCNC: 25 MMOL/L (ref 20–33)
CREAT SERPL-MCNC: 0.72 MG/DL (ref 0.5–1.4)
EKG IMPRESSION: NORMAL
EOSINOPHIL # BLD AUTO: 0.07 K/UL (ref 0–0.51)
EOSINOPHIL NFR BLD: 0.5 % (ref 0–6.9)
ERYTHROCYTE [DISTWIDTH] IN BLOOD BY AUTOMATED COUNT: 46.5 FL (ref 35.9–50)
GLOBULIN SER CALC-MCNC: 4.2 G/DL (ref 1.9–3.5)
GLUCOSE SERPL-MCNC: 147 MG/DL (ref 65–99)
HCO3 BLDA-SCNC: 23 MMOL/L (ref 17–25)
HCT VFR BLD AUTO: 43.8 % (ref 42–52)
HGB BLD-MCNC: 13.8 G/DL (ref 14–18)
IMM GRANULOCYTES # BLD AUTO: 0.13 K/UL (ref 0–0.11)
IMM GRANULOCYTES NFR BLD AUTO: 1 % (ref 0–0.9)
LACTATE BLD-SCNC: 1.3 MMOL/L (ref 0.5–2)
LACTATE BLD-SCNC: 1.3 MMOL/L (ref 0.5–2)
LACTATE BLD-SCNC: 2.5 MMOL/L (ref 0.5–2)
LYMPHOCYTES # BLD AUTO: 1.22 K/UL (ref 1–4.8)
LYMPHOCYTES NFR BLD: 9.3 % (ref 22–41)
MCH RBC QN AUTO: 27.1 PG (ref 27–33)
MCHC RBC AUTO-ENTMCNC: 31.5 G/DL (ref 33.7–35.3)
MCV RBC AUTO: 86.1 FL (ref 81.4–97.8)
MONOCYTES # BLD AUTO: 0.39 K/UL (ref 0–0.85)
MONOCYTES NFR BLD AUTO: 3 % (ref 0–13.4)
NEUTROPHILS # BLD AUTO: 11.29 K/UL (ref 1.82–7.42)
NEUTROPHILS NFR BLD: 86 % (ref 44–72)
NRBC # BLD AUTO: 0 K/UL
NRBC BLD-RTO: 0 /100 WBC
PCO2 BLDA: 33.7 MMHG (ref 26–37)
PH BLDA: 7.44 [PH] (ref 7.4–7.5)
PLATELET # BLD AUTO: 390 K/UL (ref 164–446)
PMV BLD AUTO: 10.5 FL (ref 9–12.9)
PO2 BLDA: 100.6 MMHG (ref 64–87)
POTASSIUM SERPL-SCNC: 3.8 MMOL/L (ref 3.6–5.5)
PROCALCITONIN SERPL-MCNC: 0.18 NG/ML
PROT SERPL-MCNC: 7.7 G/DL (ref 6–8.2)
RBC # BLD AUTO: 5.09 M/UL (ref 4.7–6.1)
SAO2 % BLDA: 96.8 % (ref 93–99)
SODIUM SERPL-SCNC: 137 MMOL/L (ref 135–145)
WBC # BLD AUTO: 13.1 K/UL (ref 4.8–10.8)

## 2021-01-22 PROCEDURE — 82803 BLOOD GASES ANY COMBINATION: CPT

## 2021-01-22 PROCEDURE — 770020 HCHG ROOM/CARE - TELE (206)

## 2021-01-22 PROCEDURE — 93005 ELECTROCARDIOGRAM TRACING: CPT | Performed by: EMERGENCY MEDICINE

## 2021-01-22 PROCEDURE — 700105 HCHG RX REV CODE 258: Performed by: EMERGENCY MEDICINE

## 2021-01-22 PROCEDURE — 80053 COMPREHEN METABOLIC PANEL: CPT

## 2021-01-22 PROCEDURE — 83605 ASSAY OF LACTIC ACID: CPT | Mod: 91

## 2021-01-22 PROCEDURE — 84145 PROCALCITONIN (PCT): CPT

## 2021-01-22 PROCEDURE — 87040 BLOOD CULTURE FOR BACTERIA: CPT

## 2021-01-22 PROCEDURE — 96375 TX/PRO/DX INJ NEW DRUG ADDON: CPT

## 2021-01-22 PROCEDURE — 99223 1ST HOSP IP/OBS HIGH 75: CPT | Performed by: STUDENT IN AN ORGANIZED HEALTH CARE EDUCATION/TRAINING PROGRAM

## 2021-01-22 PROCEDURE — 700111 HCHG RX REV CODE 636 W/ 250 OVERRIDE (IP): Performed by: EMERGENCY MEDICINE

## 2021-01-22 PROCEDURE — 96365 THER/PROPH/DIAG IV INF INIT: CPT

## 2021-01-22 PROCEDURE — 96367 TX/PROPH/DG ADDL SEQ IV INF: CPT

## 2021-01-22 PROCEDURE — 700111 HCHG RX REV CODE 636 W/ 250 OVERRIDE (IP): Performed by: STUDENT IN AN ORGANIZED HEALTH CARE EDUCATION/TRAINING PROGRAM

## 2021-01-22 PROCEDURE — 93005 ELECTROCARDIOGRAM TRACING: CPT

## 2021-01-22 PROCEDURE — 71045 X-RAY EXAM CHEST 1 VIEW: CPT

## 2021-01-22 PROCEDURE — 99285 EMERGENCY DEPT VISIT HI MDM: CPT

## 2021-01-22 PROCEDURE — 85025 COMPLETE CBC W/AUTO DIFF WBC: CPT

## 2021-01-22 RX ORDER — GUAIFENESIN 600 MG/1
600 TABLET, EXTENDED RELEASE ORAL 2 TIMES DAILY PRN
Status: DISCONTINUED | OUTPATIENT
Start: 2021-01-22 | End: 2021-02-01 | Stop reason: HOSPADM

## 2021-01-22 RX ORDER — ACETAMINOPHEN 500 MG
1000 TABLET ORAL
COMMUNITY
End: 2022-01-24

## 2021-01-22 RX ORDER — ACETAMINOPHEN 325 MG/1
650 TABLET ORAL EVERY 6 HOURS PRN
Status: DISCONTINUED | OUTPATIENT
Start: 2021-01-22 | End: 2021-01-28

## 2021-01-22 RX ORDER — LORAZEPAM 0.5 MG/1
0.5 TABLET ORAL EVERY 4 HOURS PRN
Status: DISCONTINUED | OUTPATIENT
Start: 2021-01-22 | End: 2021-02-01 | Stop reason: HOSPADM

## 2021-01-22 RX ORDER — DEXAMETHASONE SODIUM PHOSPHATE 10 MG/ML
6 INJECTION, SOLUTION INTRAMUSCULAR; INTRAVENOUS ONCE
Status: COMPLETED | OUTPATIENT
Start: 2021-01-22 | End: 2021-01-22

## 2021-01-22 RX ORDER — DEXAMETHASONE SODIUM PHOSPHATE 4 MG/ML
6 INJECTION, SOLUTION INTRA-ARTICULAR; INTRALESIONAL; INTRAMUSCULAR; INTRAVENOUS; SOFT TISSUE DAILY
Status: COMPLETED | OUTPATIENT
Start: 2021-01-23 | End: 2021-01-31

## 2021-01-22 RX ORDER — AZITHROMYCIN 500 MG/1
500 INJECTION, POWDER, LYOPHILIZED, FOR SOLUTION INTRAVENOUS ONCE
Status: COMPLETED | OUTPATIENT
Start: 2021-01-22 | End: 2021-01-22

## 2021-01-22 RX ORDER — FUROSEMIDE 10 MG/ML
20 INJECTION INTRAMUSCULAR; INTRAVENOUS ONCE
Status: COMPLETED | OUTPATIENT
Start: 2021-01-22 | End: 2021-01-22

## 2021-01-22 RX ORDER — ONDANSETRON 4 MG/1
4 TABLET, ORALLY DISINTEGRATING ORAL EVERY 6 HOURS PRN
Status: DISCONTINUED | OUTPATIENT
Start: 2021-01-22 | End: 2021-02-01 | Stop reason: HOSPADM

## 2021-01-22 RX ORDER — DEXAMETHASONE SODIUM PHOSPHATE 4 MG/ML
6 INJECTION, SOLUTION INTRA-ARTICULAR; INTRALESIONAL; INTRAMUSCULAR; INTRAVENOUS; SOFT TISSUE ONCE
Status: DISCONTINUED | OUTPATIENT
Start: 2021-01-22 | End: 2021-01-22

## 2021-01-22 RX ORDER — ONDANSETRON 2 MG/ML
4 INJECTION INTRAMUSCULAR; INTRAVENOUS EVERY 6 HOURS PRN
Status: DISCONTINUED | OUTPATIENT
Start: 2021-01-22 | End: 2021-01-27

## 2021-01-22 RX ADMIN — SODIUM CHLORIDE 3 G: 900 INJECTION INTRAVENOUS at 19:55

## 2021-01-22 RX ADMIN — DEXAMETHASONE SODIUM PHOSPHATE 6 MG: 10 INJECTION INTRAMUSCULAR; INTRAVENOUS at 18:29

## 2021-01-22 RX ADMIN — AZITHROMYCIN MONOHYDRATE 500 MG: 500 INJECTION, POWDER, LYOPHILIZED, FOR SOLUTION INTRAVENOUS at 20:55

## 2021-01-22 RX ADMIN — FUROSEMIDE 20 MG: 10 INJECTION, SOLUTION INTRAMUSCULAR; INTRAVENOUS at 21:56

## 2021-01-22 ASSESSMENT — COGNITIVE AND FUNCTIONAL STATUS - GENERAL
DRESSING REGULAR LOWER BODY CLOTHING: A LITTLE
STANDING UP FROM CHAIR USING ARMS: A LITTLE
HELP NEEDED FOR BATHING: A LITTLE
DRESSING REGULAR UPPER BODY CLOTHING: A LITTLE
CLIMB 3 TO 5 STEPS WITH RAILING: A LITTLE
MOBILITY SCORE: 21
SUGGESTED CMS G CODE MODIFIER MOBILITY: CJ
WALKING IN HOSPITAL ROOM: A LITTLE
SUGGESTED CMS G CODE MODIFIER DAILY ACTIVITY: CJ
DAILY ACTIVITIY SCORE: 21

## 2021-01-22 ASSESSMENT — ENCOUNTER SYMPTOMS
BLURRED VISION: 0
BRUISES/BLEEDS EASILY: 0
BLOOD IN STOOL: 0
CHILLS: 1
DOUBLE VISION: 0
ABDOMINAL PAIN: 0
MYALGIAS: 1
DIZZINESS: 0
WEAKNESS: 0
SPUTUM PRODUCTION: 0
SHORTNESS OF BREATH: 1
FOCAL WEAKNESS: 0
PALPITATIONS: 0
VOMITING: 0
HEADACHES: 1
MEMORY LOSS: 0
NAUSEA: 0
SORE THROAT: 0
COUGH: 1
NERVOUS/ANXIOUS: 0
FEVER: 1
FALLS: 0
CONSTIPATION: 0
DIARRHEA: 1

## 2021-01-22 ASSESSMENT — LIFESTYLE VARIABLES
TOTAL SCORE: 0
EVER HAD A DRINK FIRST THING IN THE MORNING TO STEADY YOUR NERVES TO GET RID OF A HANGOVER: NO
HAVE PEOPLE ANNOYED YOU BY CRITICIZING YOUR DRINKING: NO
DOES PATIENT WANT TO STOP DRINKING: NO
HAVE YOU EVER FELT YOU SHOULD CUT DOWN ON YOUR DRINKING: NO
ON A TYPICAL DAY WHEN YOU DRINK ALCOHOL HOW MANY DRINKS DO YOU HAVE: 0
AVERAGE NUMBER OF DAYS PER WEEK YOU HAVE A DRINK CONTAINING ALCOHOL: 0
ALCOHOL_USE: YES
EVER FELT BAD OR GUILTY ABOUT YOUR DRINKING: NO
CONSUMPTION TOTAL: NEGATIVE
TOTAL SCORE: 0
TOTAL SCORE: 0
HOW MANY TIMES IN THE PAST YEAR HAVE YOU HAD 5 OR MORE DRINKS IN A DAY: 0

## 2021-01-22 ASSESSMENT — FIBROSIS 4 INDEX: FIB4 SCORE: 0.91

## 2021-01-23 PROBLEM — J96.01 ACUTE HYPOXEMIC RESPIRATORY FAILURE DUE TO COVID-19 (HCC): Status: ACTIVE | Noted: 2021-01-23

## 2021-01-23 PROBLEM — R20.2 NUMBNESS AND TINGLING OF RIGHT LEG: Status: ACTIVE | Noted: 2021-01-23

## 2021-01-23 PROBLEM — U07.1 ACUTE HYPOXEMIC RESPIRATORY FAILURE DUE TO COVID-19 (HCC): Status: ACTIVE | Noted: 2021-01-23

## 2021-01-23 PROBLEM — E66.9 OBESITY (BMI 30.0-34.9): Status: ACTIVE | Noted: 2021-01-23

## 2021-01-23 PROBLEM — R20.0 NUMBNESS AND TINGLING OF RIGHT LEG: Status: ACTIVE | Noted: 2021-01-23

## 2021-01-23 LAB
ALBUMIN SERPL BCP-MCNC: 3.4 G/DL (ref 3.2–4.9)
ALBUMIN/GLOB SERPL: 0.9 G/DL
ALP SERPL-CCNC: 52 U/L (ref 30–99)
ALT SERPL-CCNC: 58 U/L (ref 2–50)
ANION GAP SERPL CALC-SCNC: 13 MMOL/L (ref 7–16)
AST SERPL-CCNC: 55 U/L (ref 12–45)
BASOPHILS # BLD AUTO: 0.1 % (ref 0–1.8)
BASOPHILS # BLD: 0.01 K/UL (ref 0–0.12)
BILIRUB SERPL-MCNC: 0.2 MG/DL (ref 0.1–1.5)
BUN SERPL-MCNC: 11 MG/DL (ref 8–22)
CALCIUM SERPL-MCNC: 8.6 MG/DL (ref 8.5–10.5)
CHLORIDE SERPL-SCNC: 103 MMOL/L (ref 96–112)
CHOLEST SERPL-MCNC: 123 MG/DL (ref 100–199)
CO2 SERPL-SCNC: 22 MMOL/L (ref 20–33)
CREAT SERPL-MCNC: 0.67 MG/DL (ref 0.5–1.4)
CRP SERPL HS-MCNC: 17.82 MG/DL (ref 0–0.75)
CRP SERPL HS-MCNC: 17.97 MG/DL (ref 0–0.75)
D DIMER PPP IA.FEU-MCNC: 1.21 UG/ML (FEU) (ref 0–0.5)
EOSINOPHIL # BLD AUTO: 0 K/UL (ref 0–0.51)
EOSINOPHIL NFR BLD: 0 % (ref 0–6.9)
ERYTHROCYTE [DISTWIDTH] IN BLOOD BY AUTOMATED COUNT: 45.4 FL (ref 35.9–50)
ERYTHROCYTE [SEDIMENTATION RATE] IN BLOOD BY WESTERGREN METHOD: 89 MM/HOUR (ref 0–15)
EST. AVERAGE GLUCOSE BLD GHB EST-MCNC: 169 MG/DL
GLOBULIN SER CALC-MCNC: 4 G/DL (ref 1.9–3.5)
GLUCOSE BLD-MCNC: 160 MG/DL (ref 65–99)
GLUCOSE BLD-MCNC: 168 MG/DL (ref 65–99)
GLUCOSE SERPL-MCNC: 168 MG/DL (ref 65–99)
HBA1C MFR BLD: 7.5 % (ref 0–5.6)
HCT VFR BLD AUTO: 42.3 % (ref 42–52)
HDLC SERPL-MCNC: 39 MG/DL
HGB BLD-MCNC: 13.7 G/DL (ref 14–18)
IMM GRANULOCYTES # BLD AUTO: 0.06 K/UL (ref 0–0.11)
IMM GRANULOCYTES NFR BLD AUTO: 0.7 % (ref 0–0.9)
LDLC SERPL CALC-MCNC: 62 MG/DL
LYMPHOCYTES # BLD AUTO: 0.68 K/UL (ref 1–4.8)
LYMPHOCYTES NFR BLD: 8.1 % (ref 22–41)
MAGNESIUM SERPL-MCNC: 2.3 MG/DL (ref 1.5–2.5)
MCH RBC QN AUTO: 27.6 PG (ref 27–33)
MCHC RBC AUTO-ENTMCNC: 32.4 G/DL (ref 33.7–35.3)
MCV RBC AUTO: 85.1 FL (ref 81.4–97.8)
MONOCYTES # BLD AUTO: 0.26 K/UL (ref 0–0.85)
MONOCYTES NFR BLD AUTO: 3.1 % (ref 0–13.4)
NEUTROPHILS # BLD AUTO: 7.36 K/UL (ref 1.82–7.42)
NEUTROPHILS NFR BLD: 88 % (ref 44–72)
NRBC # BLD AUTO: 0 K/UL
NRBC BLD-RTO: 0 /100 WBC
PHOSPHATE SERPL-MCNC: 2.6 MG/DL (ref 2.5–4.5)
PLATELET # BLD AUTO: 412 K/UL (ref 164–446)
PMV BLD AUTO: 11.2 FL (ref 9–12.9)
POTASSIUM SERPL-SCNC: 4.4 MMOL/L (ref 3.6–5.5)
PROT SERPL-MCNC: 7.4 G/DL (ref 6–8.2)
RBC # BLD AUTO: 4.97 M/UL (ref 4.7–6.1)
SODIUM SERPL-SCNC: 138 MMOL/L (ref 135–145)
TRIGL SERPL-MCNC: 111 MG/DL (ref 0–149)
WBC # BLD AUTO: 8.4 K/UL (ref 4.8–10.8)

## 2021-01-23 PROCEDURE — 84100 ASSAY OF PHOSPHORUS: CPT

## 2021-01-23 PROCEDURE — A9270 NON-COVERED ITEM OR SERVICE: HCPCS | Performed by: STUDENT IN AN ORGANIZED HEALTH CARE EDUCATION/TRAINING PROGRAM

## 2021-01-23 PROCEDURE — 83036 HEMOGLOBIN GLYCOSYLATED A1C: CPT

## 2021-01-23 PROCEDURE — 700102 HCHG RX REV CODE 250 W/ 637 OVERRIDE(OP): Performed by: STUDENT IN AN ORGANIZED HEALTH CARE EDUCATION/TRAINING PROGRAM

## 2021-01-23 PROCEDURE — 770020 HCHG ROOM/CARE - TELE (206)

## 2021-01-23 PROCEDURE — 700111 HCHG RX REV CODE 636 W/ 250 OVERRIDE (IP): Performed by: STUDENT IN AN ORGANIZED HEALTH CARE EDUCATION/TRAINING PROGRAM

## 2021-01-23 PROCEDURE — 80053 COMPREHEN METABOLIC PANEL: CPT

## 2021-01-23 PROCEDURE — 86140 C-REACTIVE PROTEIN: CPT | Mod: 91

## 2021-01-23 PROCEDURE — 83735 ASSAY OF MAGNESIUM: CPT

## 2021-01-23 PROCEDURE — 80061 LIPID PANEL: CPT

## 2021-01-23 PROCEDURE — 36415 COLL VENOUS BLD VENIPUNCTURE: CPT

## 2021-01-23 PROCEDURE — 85652 RBC SED RATE AUTOMATED: CPT

## 2021-01-23 PROCEDURE — 85025 COMPLETE CBC W/AUTO DIFF WBC: CPT

## 2021-01-23 PROCEDURE — 85379 FIBRIN DEGRADATION QUANT: CPT

## 2021-01-23 PROCEDURE — 99233 SBSQ HOSP IP/OBS HIGH 50: CPT | Performed by: STUDENT IN AN ORGANIZED HEALTH CARE EDUCATION/TRAINING PROGRAM

## 2021-01-23 PROCEDURE — 82962 GLUCOSE BLOOD TEST: CPT

## 2021-01-23 RX ORDER — MULTIVITAMIN
1 CAPSULE ORAL DAILY
Status: DISCONTINUED | OUTPATIENT
Start: 2021-01-23 | End: 2021-01-23

## 2021-01-23 RX ORDER — LOSARTAN POTASSIUM 50 MG/1
50 TABLET ORAL DAILY
Status: DISCONTINUED | OUTPATIENT
Start: 2021-01-23 | End: 2021-01-26

## 2021-01-23 RX ORDER — AZITHROMYCIN 250 MG/1
500 TABLET, FILM COATED ORAL DAILY
Status: COMPLETED | OUTPATIENT
Start: 2021-01-23 | End: 2021-01-24

## 2021-01-23 RX ORDER — DEXTROSE MONOHYDRATE 25 G/50ML
50 INJECTION, SOLUTION INTRAVENOUS
Status: DISCONTINUED | OUTPATIENT
Start: 2021-01-23 | End: 2021-02-01 | Stop reason: HOSPADM

## 2021-01-23 RX ORDER — TAMSULOSIN HYDROCHLORIDE 0.4 MG/1
0.4 CAPSULE ORAL
Status: DISCONTINUED | OUTPATIENT
Start: 2021-01-23 | End: 2021-02-01 | Stop reason: HOSPADM

## 2021-01-23 RX ADMIN — ACETAMINOPHEN 650 MG: 325 TABLET ORAL at 18:16

## 2021-01-23 RX ADMIN — ENOXAPARIN SODIUM 40 MG: 40 INJECTION SUBCUTANEOUS at 05:47

## 2021-01-23 RX ADMIN — THERA TABS 1 TABLET: TAB at 05:47

## 2021-01-23 RX ADMIN — INSULIN HUMAN 1 UNITS: 100 INJECTION, SOLUTION PARENTERAL at 18:11

## 2021-01-23 RX ADMIN — TAMSULOSIN HYDROCHLORIDE 0.4 MG: 0.4 CAPSULE ORAL at 08:15

## 2021-01-23 RX ADMIN — INSULIN HUMAN 1 UNITS: 100 INJECTION, SOLUTION PARENTERAL at 20:41

## 2021-01-23 RX ADMIN — DEXAMETHASONE SODIUM PHOSPHATE 6 MG: 4 INJECTION, SOLUTION INTRA-ARTICULAR; INTRALESIONAL; INTRAMUSCULAR; INTRAVENOUS; SOFT TISSUE at 05:48

## 2021-01-23 RX ADMIN — AZITHROMYCIN MONOHYDRATE 500 MG: 250 TABLET ORAL at 17:14

## 2021-01-23 RX ADMIN — LOSARTAN POTASSIUM 50 MG: 50 TABLET, FILM COATED ORAL at 05:48

## 2021-01-23 ASSESSMENT — PATIENT HEALTH QUESTIONNAIRE - PHQ9
SUM OF ALL RESPONSES TO PHQ QUESTIONS 1-9: 2
6. FEELING BAD ABOUT YOURSELF - OR THAT YOU ARE A FAILURE OR HAVE LET YOURSELF OR YOUR FAMILY DOWN: NOT AL ALL
4. FEELING TIRED OR HAVING LITTLE ENERGY: SEVERAL DAYS
8. MOVING OR SPEAKING SO SLOWLY THAT OTHER PEOPLE COULD HAVE NOTICED. OR THE OPPOSITE, BEING SO FIGETY OR RESTLESS THAT YOU HAVE BEEN MOVING AROUND A LOT MORE THAN USUAL: NOT AT ALL
3. TROUBLE FALLING OR STAYING ASLEEP OR SLEEPING TOO MUCH: NOT AT ALL
9. THOUGHTS THAT YOU WOULD BE BETTER OFF DEAD, OR OF HURTING YOURSELF: NOT AT ALL
5. POOR APPETITE OR OVEREATING: NOT AT ALL
1. LITTLE INTEREST OR PLEASURE IN DOING THINGS: SEVERAL DAYS
7. TROUBLE CONCENTRATING ON THINGS, SUCH AS READING THE NEWSPAPER OR WATCHING TELEVISION: NOT AT ALL
SUM OF ALL RESPONSES TO PHQ9 QUESTIONS 1 AND 2: 1
2. FEELING DOWN, DEPRESSED, IRRITABLE, OR HOPELESS: NOT AT ALL

## 2021-01-23 ASSESSMENT — ENCOUNTER SYMPTOMS
EYES NEGATIVE: 1
WEAKNESS: 1
COUGH: 1
NAUSEA: 0
MYALGIAS: 0
FEVER: 0
CHILLS: 0
VOMITING: 0
SHORTNESS OF BREATH: 1
HEADACHES: 0
ABDOMINAL PAIN: 0

## 2021-01-23 ASSESSMENT — FIBROSIS 4 INDEX: FIB4 SCORE: 0.82

## 2021-01-23 ASSESSMENT — PAIN DESCRIPTION - PAIN TYPE: TYPE: ACUTE PAIN

## 2021-01-23 NOTE — H&P
Hospital Medicine History & Physical Note    Date of Service  1/23/2021    Primary Care Physician  Pcp Pt States None    Consultants     services were used in the patient's primary language of Barbadian.     Name or Number: Christoph [id number]  Mode of interpretation: iPad    Content of Interpretation:  History taking, review of systems, and physical exam.  Explanation of plan of care.      Code Status  Full Code    Chief Complaint  Chief Complaint   Patient presents with   • Cough   • Shortness of Breath       History of Presenting Illness  45 y.o. male who presented 1/22/2021 with worsening shortness of breath and cough for approximately a week.  He has a history of obesity, hypertension, BPH, and snoring.      He was brought in by ambulance today due to acute worsening of shortness of breath.  In the ER, he was noted to have SPO2 of 74% on room air, which improved to greater than 97% on 6 LPM of oxygen.  Patient also reports development of fevers and chills several days prior.  He also reports production of sputum with his cough.  He also complains of a headache as well as numbness in his right leg started about 3 days ago.  Denies any history of smoking.  He drinks alcohol occasionally.    In the ER, patient had a white count of 13.1 with a left shift, procalcitonin of 0.18.  He did receive a dose of Unasyn and azithromycin per ER physician.  Chest x-ray showed diffuse interstitial infiltrates concerning for COVID-19 infection.      Review of Systems  Review of Systems   Constitutional: Positive for chills, fever and malaise/fatigue.   HENT: Negative for ear pain and sore throat.    Eyes: Negative for blurred vision and double vision.   Respiratory: Positive for cough and shortness of breath. Negative for sputum production.    Cardiovascular: Negative for chest pain and palpitations.   Gastrointestinal: Positive for diarrhea. Negative for abdominal pain, blood in stool, constipation, nausea and  vomiting.   Genitourinary: Negative for dysuria, frequency and urgency.   Musculoskeletal: Positive for myalgias. Negative for falls and joint pain.   Skin: Negative for itching and rash.   Neurological: Positive for headaches. Negative for dizziness, focal weakness and weakness.   Endo/Heme/Allergies: Negative for environmental allergies. Does not bruise/bleed easily.   Psychiatric/Behavioral: Negative for memory loss. The patient is not nervous/anxious.        Past Medical History   has a past medical history of Arrhythmia, Hypertension, Renal disorder (2020), and Snoring.    Surgical History   has a past surgical history that includes other orthopedic surgery (Left, 2001); pr cystoscopy,insert ureteral stent (10/12/2020); pr cysto/uretero/pyeloscopy, dx (10/12/2020); and ventral hernia repair robotic xi (11/3/2020).     Family History  family history includes Hypertension in his father and mother.     Social History   reports that he has never smoked. He has never used smokeless tobacco. He reports current alcohol use. He reports that he does not use drugs.    Allergies  No Known Allergies    Medications  Prior to Admission Medications   Prescriptions Last Dose Informant Patient Reported? Taking?   Multiple Vitamin (MULTIVITAMINS PO) 1/21/2021 at AM Patient Yes Yes   Sig: Take 3 Tabs by mouth every day.   acetaminophen (TYLENOL) 500 MG Tab 1/21/2021 at AM Patient Yes Yes   Sig: Take 1,000 mg by mouth one time as needed (pain).   benzonatate (TESSALON) 100 MG Cap Not Taking at Unknown time Patient No No   Sig: Take 2 Caps by mouth 3 times a day as needed for Cough.   Patient not taking: Reported on 1/22/2021   losartan (COZAAR) 50 MG Tab 1/22/2021 at 0900 Patient Yes No   Sig: Take 50 mg by mouth every day.   ondansetron (ZOFRAN ODT) 4 MG TABLET DISPERSIBLE Not Taking at Unknown time Patient No No   Sig: Take 1 Tab by mouth every 6 hours as needed.   Patient not taking: Reported on 1/22/2021   oxybutynin  (DITROPAN) 5 MG Tab Not Taking at Unknown time Patient No No   Sig: Take 1 Tab by mouth 3 times a day.   Patient not taking: Reported on 1/22/2021   tamsulosin (FLOMAX) 0.4 MG capsule Not Taking at Unknown time Patient No No   Sig: Take 1 Cap by mouth every day.   Patient not taking: Reported on 1/22/2021      Facility-Administered Medications: None       Physical Exam  Temp:  [37.5 °C (99.5 °F)-37.9 °C (100.2 °F)] 37.5 °C (99.5 °F)  Pulse:  [85-94] 86  Resp:  [42-49] 49  BP: (126-146)/(75-95) 143/88  SpO2:  [97 %-99 %] 99 %    Physical Exam  Vitals signs and nursing note reviewed.   Constitutional:       General: He is not in acute distress.     Appearance: He is well-developed. He is obese. He is ill-appearing and diaphoretic. He is not toxic-appearing.   HENT:      Head: Normocephalic and atraumatic.      Right Ear: External ear normal.      Left Ear: External ear normal.      Nose: Nose normal. No congestion or rhinorrhea.      Mouth/Throat:      Mouth: Mucous membranes are moist.      Pharynx: No oropharyngeal exudate or posterior oropharyngeal erythema.   Eyes:      General: No scleral icterus.        Right eye: No discharge.         Left eye: No discharge.      Conjunctiva/sclera: Conjunctivae normal.      Pupils: Pupils are equal, round, and reactive to light.   Neck:      Musculoskeletal: Neck supple. No neck rigidity.      Thyroid: No thyromegaly.      Vascular: No JVD.      Trachea: No tracheal deviation.   Cardiovascular:      Rate and Rhythm: Normal rate and regular rhythm.      Heart sounds: Normal heart sounds. No murmur. No friction rub. No gallop.    Pulmonary:      Effort: Respiratory distress present.      Breath sounds: No stridor. Rales present. No wheezing.   Abdominal:      General: Bowel sounds are normal. There is no distension.      Palpations: Abdomen is soft. There is no mass.      Tenderness: There is no abdominal tenderness. There is no guarding or rebound.   Musculoskeletal:          General: No tenderness or deformity.      Right lower leg: No edema.      Left lower leg: No edema.   Lymphadenopathy:      Cervical: No cervical adenopathy.   Skin:     General: Skin is warm.      Coloration: Skin is not pale.      Findings: No erythema or rash.   Neurological:      Mental Status: He is alert and oriented to person, place, and time.      Cranial Nerves: No cranial nerve deficit.      Sensory: No sensory deficit.      Motor: No abnormal muscle tone.   Psychiatric:         Behavior: Behavior normal.         Thought Content: Thought content normal.         Judgment: Judgment normal.         Laboratory:  Recent Labs     01/22/21  1810   WBC 13.1*   RBC 5.09   HEMOGLOBIN 13.8*   HEMATOCRIT 43.8   MCV 86.1   MCH 27.1   MCHC 31.5*   RDW 46.5   PLATELETCT 390   MPV 10.5     Recent Labs     01/22/21  1810   SODIUM 137   POTASSIUM 3.8   CHLORIDE 100   CO2 25   GLUCOSE 147*   BUN 9   CREATININE 0.72   CALCIUM 8.7     Recent Labs     01/22/21  1810   ALTSGPT 70*   ASTSGOT 63*   ALKPHOSPHAT 57   TBILIRUBIN 0.2   GLUCOSE 147*         No results for input(s): NTPROBNP in the last 72 hours.      No results for input(s): TROPONINT in the last 72 hours.    Imaging:  DX-CHEST-PORTABLE (1 VIEW)   Final Result      Multifocal bilateral pneumonia.          Chest x-ray per my read, shows significant diffuse interstitial infiltrates concerning for COVID-19 pneumonia.      EKG per my read, shows normal sinus rhythm with heart rate of 96, QTc 455, no significant ST elevation or depression.      Assessment/Plan:  I anticipate this patient will require at least two midnights for appropriate medical management, necessitating inpatient admission.    Acute hypoxemic respiratory failure due to COVID-19 ([facility])- (present on admission)  Assessment & Plan  Requiring 8 LPM oxygen with tachypnea.  Received azithromycin and Unasyn in the ER.    Admit to telemetry for close monitoring.  Respiratory consult for high flow oxygen to decrease  work of breathing.  Continue dexamethasone 6 mg daily for 10 days.  Continue azithromycin for anti-inflammatory effects and possible atypical bacterial pneumonia in setting of normal procalcitonin level.    Elevated glucose- (present on admission)  Assessment & Plan  Check hemoglobin A1c.  Low threshold for hospital insulin sliding scale.    HTN (hypertension)- (present on admission)  Assessment & Plan  Controlled.    Continue home losartan.    Numbness and tingling of right leg- (present on admission)  Assessment & Plan  Possibly due to radiculopathy vs DVT.    Continue to monitor.  Consider gabapentin if persistent.  Check D-dimer.  Consider ultrasound leg to assess for DVT.    Obesity (BMI 30.0-34.9)- (present on admission)  Assessment & Plan  BMI 34.     healthy lifestyle changes when stable.

## 2021-01-23 NOTE — ASSESSMENT & PLAN NOTE
COVID positive 1/17/21  Contact, droplet, eye precaution  Oxygen per protocol  Decadron day 9/10  Lasix 20mg daily  Trend inflammatory markers  4L nasal cannula

## 2021-01-23 NOTE — ED TRIAGE NOTES
Patient comes in 74% on RA, post covid positive test.  Placed on 6L via n/c now sating at 96%.  Fevers, bodyaches and chills.

## 2021-01-23 NOTE — ED NOTES
Bedside report to receiving RNSneha. All questions answered. Pt to floor on zoll and 6L O2 with chart and all belongings. No acute changes in pt condition.

## 2021-01-23 NOTE — ED NOTES
Patient comes in diagnosed with covid post 10 days ago, increased sob, fevers, body aches.  74% on RA by EMS, now on 5L via n/c and sating 96%

## 2021-01-23 NOTE — PROGRESS NOTES
[facility] Medicine Daily Progress Note    Date of Service  1/23/2021    Chief Complaint  45 y.o. male admitted 1/22/2021 with SOB, cough    Hospital Course  A 45-year-old man with h/o HTN, BPH, snoring, obesity, recently diagnosed COVID 19 infection (1/17/21) presented with worsening SOB, cough for one week.  Saturation was 74% on room air, improved to 97% on 6 L NC oxygen. Leukocytosis  13.1. CXR showed diffuse interstitial infiltrates concerning for COVID 19 infection.  Blood culture negative so far.    Interval Problem Update  Patient reports cough, SOB, weakness. Denies fever, nausea.  Patient is afebrile, hemodynamically stable, tachypneic. On 6 L NC oxygen.  Sinus rhythm, HR 71-94 on telemonitor.  D-dimer 1.21  CRP 17.97  sed rate 89    Consultants/Specialty  None    Code Status  Full Code    Disposition  TBD    Review of Systems  Review of Systems   Constitutional: Positive for malaise/fatigue. Negative for chills and fever.   HENT: Negative.    Eyes: Negative.    Respiratory: Positive for cough and shortness of breath.    Cardiovascular: Negative for chest pain.   Gastrointestinal: Negative for abdominal pain, nausea and vomiting.   Genitourinary: Negative for dysuria.   Musculoskeletal: Negative for myalgias.   Skin: Negative for rash.   Neurological: Positive for weakness. Negative for headaches.        Physical Exam  Temp:  [36.4 °C (97.5 °F)-37.9 °C (100.2 °F)] 36.4 °C (97.5 °F)  Pulse:  [79-94] 79  Resp:  [20-49] 20  BP: (121-146)/(75-95) 121/87  SpO2:  [91 %-99 %] 91 %    Physical Exam  Vitals signs and nursing note reviewed.   Constitutional:       Appearance: He is obese. He is ill-appearing.   HENT:      Head: Normocephalic.      Mouth/Throat:      Mouth: Mucous membranes are moist.      Pharynx: Oropharynx is clear.   Eyes:      Pupils: Pupils are equal, round, and reactive to light.   Neck:      Musculoskeletal: Normal range of motion.   Cardiovascular:      Rate and Rhythm: Normal rate and regular  rhythm.      Heart sounds: Normal heart sounds.   Pulmonary:      Effort: Pulmonary effort is normal.      Breath sounds: Rales (b/l) present.   Abdominal:      General: Abdomen is flat. Bowel sounds are normal.      Tenderness: There is no abdominal tenderness.   Musculoskeletal: Normal range of motion.   Skin:     General: Skin is warm.   Neurological:      General: No focal deficit present.      Mental Status: He is alert and oriented to person, place, and time.         Fluids    Intake/Output Summary (Last 24 hours) at 1/23/2021 1226  Last data filed at 1/22/2021 2049  Gross per 24 hour   Intake 100 ml   Output --   Net 100 ml       Laboratory  Recent Labs     01/22/21  1810 01/23/21  0716   WBC 13.1* 8.4   RBC 5.09 4.97   HEMOGLOBIN 13.8* 13.7*   HEMATOCRIT 43.8 42.3   MCV 86.1 85.1   MCH 27.1 27.6   MCHC 31.5* 32.4*   RDW 46.5 45.4   PLATELETCT 390 412   MPV 10.5 11.2     Recent Labs     01/22/21  1810 01/23/21  0716   SODIUM 137 138   POTASSIUM 3.8 4.4   CHLORIDE 100 103   CO2 25 22   GLUCOSE 147* 168*   BUN 9 11   CREATININE 0.72 0.67   CALCIUM 8.7 8.6             Recent Labs     01/23/21  0716   TRIGLYCERIDE 111   HDL 39*   LDL 62       Imaging  DX-CHEST-PORTABLE (1 VIEW)   Final Result      Multifocal bilateral pneumonia.           Assessment/Plan  Acute hypoxemic respiratory failure due to COVID-19 ([facility])- (present on admission)  Assessment & Plan  COVID positive 1/17/21  Contact, droplet, eye precaution  Received azithromycin and Unasyn in the ER.  Continue dexamethasone 6 mg daily for 10 days.  Continue azithromycin for anti-inflammatory effects and possible atypical bacterial pneumonia in setting of normal procalcitonin level  Oxygen per protocol  Requiring 6 LPM NC oxygen    Elevated glucose- (present on admission)  Assessment & Plan  Hemoglobin A1c 7.5  Newly diagnosed DM  Diet consistent carbohydrates  POCT glucose  Sliding scale insulin  Diabetic education    HTN (hypertension)- (present on  admission)  Assessment & Plan  Controlled  Continue home losartan.    Numbness and tingling of right leg- (present on admission)  Assessment & Plan  Possibly due to radiculopathy vs DVT  Continue to monitor  Consider gabapentin if persistent  D-dimer 1/21  ultrasound leg to assess for DVT    Obesity (BMI 30.0-34.9)- (present on admission)  Assessment & Plan  BMI 34.   healthy lifestyle changes when stable       VTE prophylaxis: lovenox

## 2021-01-23 NOTE — ASSESSMENT & PLAN NOTE
Hemoglobin A1c 7.5  Newly diagnosed DM  Diet consistent carbohydrates  POCT glucose  Sliding scale insulin  Diabetic education

## 2021-01-23 NOTE — PROGRESS NOTES
Pt new admission rested well during the night, denied pain, offered no complaints. Pt 's primary  Language is Persian, however pt does understand enough english to answer questions appropriately and speaks enough english to make his needs known. Pt is calm and cooperative, 02 in progress @ 6L/min nc, no signs of resp distress noted. Lung sounds diminished at the bases, HOB elevated to facilitate easier breathing.  Pt on tele-monitoring  Rhythm- NSR    Rate- 71-94   16/.08/.36   no ectopy   Pt is cont of Bowel and bladder, ambulates to BR with standby assist , voiding freely. Pt was diaphoretic upon admission to unit, symptoms have subsided. All meds given, po fluids encouraged and taken well. Droplet, contact and safety precautions maintained.

## 2021-01-23 NOTE — ED NOTES
Med rec complete per Pt at bedside   Allergies reviewed   No ABX in last 14 days     Pharmacy-[facility]

## 2021-01-23 NOTE — ED PROVIDER NOTES
ED Provider Note    Scribed for Johnathan Valenica M.D. by Yang Be. 1/22/2021, 6:13 PM.    Primary care provider: None reported  Means of arrival: Ambulance  History obtained from: Patient  History limited by: None    CHIEF COMPLAINT  Chief Complaint   Patient presents with   • Cough   • Shortness of Breath       HPI  Herrera Barros is a 45 y.o. male who presents to the Emergency Department for evaluation of acute, worsening shortness of breath onset a week and a half ago. He was feeling short of breath and called EMS. En route to the ED, he was 74% on room air and is now 96% on 6 L of oxygen through a nasal canula. He was diagnosed with Covid 10 days ago. He states that only two people at his workplace have been positive and no one in his immediate family has tested positive. The patient reports associated headache, body aches, fever, and diaphoresis. Negative for vomiting or abdominal pain. No alleviating or exacerbating factors identified by the patient. The patient has a history of hypertension.     REVIEW OF SYSTEMS  As above otherwise all other systems are negative    PAST MEDICAL HISTORY   has a past medical history of Arrhythmia, Hypertension, Renal disorder (2020), and Snoring.    SURGICAL HISTORY   has a past surgical history that includes other orthopedic surgery (Left, 2001); cystoscopy,insert ureteral stent (10/12/2020); cysto/uretero/pyeloscopy, dx (10/12/2020); and ventral hernia repair robotic xi (11/3/2020).    SOCIAL HISTORY  Social History     Tobacco Use   • Smoking status: Never Smoker   • Smokeless tobacco: Never Used   Substance Use Topics   • Alcohol use: Yes     Frequency: Monthly or less     Comment: 1 per month   • Drug use: No      Social History     Substance and Sexual Activity   Drug Use No       FAMILY HISTORY  No family history on file.    CURRENT MEDICATIONS  Home Medications     Reviewed by Virginia Trinidad (Pharmacy Tech) on 01/22/21 at 1944  Med List  Status: Complete   Medication Last Dose Status   acetaminophen (TYLENOL) 500 MG Tab 1/21/2021 Active   benzonatate (TESSALON) 100 MG Cap Not Taking Active   losartan (COZAAR) 50 MG Tab 1/22/2021 Active   Multiple Vitamin (MULTIVITAMINS PO) 1/21/2021 Active   ondansetron (ZOFRAN ODT) 4 MG TABLET DISPERSIBLE Not Taking Active   oxybutynin (DITROPAN) 5 MG Tab Not Taking Active   tamsulosin (FLOMAX) 0.4 MG capsule Not Taking Active                ALLERGIES  No Known Allergies    PHYSICAL EXAM  VITAL SIGNS: /93   Pulse 94   Temp 37.9 °C (100.2 °F) (Oral)   Resp (!) 46   SpO2 97%     Constitutional: Well developed, Well nourished, No acute distress, Non-toxic appearance.   HENT: Normocephalic, Atraumatic, Bilateral external ears normal, oropharynx moist, No oral exudates, Nose normal.   Eyes:conjunctiva is normal, there are no signs of exudate.   Neck: Supple, no meningeal signs.  Lymphatic: No lymphadenopathy noted.   Cardiovascular: Regular rate and rhythm without murmurs gallops or rubs.   Thorax & Lungs: Crackles at bilateral bases. No respiratory distress. Breathing comfortably. Lungs are clear to auscultation bilaterally, there are no wheezes no rales. Chest wall is nontender.  Abdomen: Soft, nontender, nondistended. Bowel sounds are present.   Skin: Warm, Dry, No erythema,   Back: No tenderness, No CVA tenderness.  Musculoskeletal: Good range of motion in all major joints. No tenderness to palpation or major deformities noted. Intact distal pulses, no clubbing, no cyanosis, no edema,   Neurologic: Alert & oriented x 3, Moving all extremities. No gross abnormalities.    Psychiatric: Affect normal, Judgment normal, Mood normal.     LABS  Results for orders placed or performed during the hospital encounter of 01/22/21   Lactic acid (lactate): Repeat if initial lactic acid result is greater than 2   Result Value Ref Range    Lactic Acid 1.3 0.5 - 2.0 mmol/L   CBC WITH DIFFERENTIAL   Result Value Ref Range     WBC 13.1 (H) 4.8 - 10.8 K/uL    RBC 5.09 4.70 - 6.10 M/uL    Hemoglobin 13.8 (L) 14.0 - 18.0 g/dL    Hematocrit 43.8 42.0 - 52.0 %    MCV 86.1 81.4 - 97.8 fL    MCH 27.1 27.0 - 33.0 pg    MCHC 31.5 (L) 33.7 - 35.3 g/dL    RDW 46.5 35.9 - 50.0 fL    Platelet Count 390 164 - 446 K/uL    MPV 10.5 9.0 - 12.9 fL    Neutrophils-Polys 86.00 (H) 44.00 - 72.00 %    Lymphocytes 9.30 (L) 22.00 - 41.00 %    Monocytes 3.00 0.00 - 13.40 %    Eosinophils 0.50 0.00 - 6.90 %    Basophils 0.20 0.00 - 1.80 %    Immature Granulocytes 1.00 (H) 0.00 - 0.90 %    Nucleated RBC 0.00 /100 WBC    Neutrophils (Absolute) 11.29 (H) 1.82 - 7.42 K/uL    Lymphs (Absolute) 1.22 1.00 - 4.80 K/uL    Monos (Absolute) 0.39 0.00 - 0.85 K/uL    Eos (Absolute) 0.07 0.00 - 0.51 K/uL    Baso (Absolute) 0.02 0.00 - 0.12 K/uL    Immature Granulocytes (abs) 0.13 (H) 0.00 - 0.11 K/uL    NRBC (Absolute) 0.00 K/uL   COMP METABOLIC PANEL   Result Value Ref Range    Sodium 137 135 - 145 mmol/L    Potassium 3.8 3.6 - 5.5 mmol/L    Chloride 100 96 - 112 mmol/L    Co2 25 20 - 33 mmol/L    Anion Gap 12.0 7.0 - 16.0    Glucose 147 (H) 65 - 99 mg/dL    Bun 9 8 - 22 mg/dL    Creatinine 0.72 0.50 - 1.40 mg/dL    Calcium 8.7 8.5 - 10.5 mg/dL    AST(SGOT) 63 (H) 12 - 45 U/L    ALT(SGPT) 70 (H) 2 - 50 U/L    Alkaline Phosphatase 57 30 - 99 U/L    Total Bilirubin 0.2 0.1 - 1.5 mg/dL    Albumin 3.5 3.2 - 4.9 g/dL    Total Protein 7.7 6.0 - 8.2 g/dL    Globulin 4.2 (H) 1.9 - 3.5 g/dL    A-G Ratio 0.8 g/dL   PROCALCITONIN   Result Value Ref Range    Procalcitonin 0.18 <0.25 ng/mL   LACTIC ACID   Result Value Ref Range    Lactic Acid 2.5 (H) 0.5 - 2.0 mmol/L   ESTIMATED GFR   Result Value Ref Range    GFR If African American >60 >60 mL/min/1.73 m 2    GFR If Non African American >60 >60 mL/min/1.73 m 2   EKG   Result Value Ref Range    Report       [facility] Emergency Dept.    Test Date:  2021-01-22  Pt Name:    ADDIE OHARA   Department: ER  MRN:         [id number]                      Room:       [facility]  Gender:     Male                         Technician: [id number]  :        1975                   Requested By:ER TRIAGE PROTOCOL  Order #:    [id number]                    Reading MD: JEY MUNOZ MD    Measurements  Intervals                                Axis  Rate:       96                           P:          40  NV:         172                          QRS:        32  QRSD:       92                           T:          28  QT:         360  QTc:        455    Interpretive Statements  SINUS RHYTHM  EARLY PRECORDIAL R/S TRANSITION  BASELINE WANDER IN LEAD(S) V3,V4  No previous ECG available for comparison  Otherwise Normal ECG  Electronically Signed On 2021 19:21:00 PST by JEY MUNOZ MD       All labs reviewed by me.    EKG  Interpreted by me as above    RADIOLOGY  DX-CHEST-PORTABLE (1 VIEW)   Final Result      Multifocal bilateral pneumonia.        The radiologist's interpretation of all radiological studies have been reviewed by me.    COURSE & MEDICAL DECISION MAKING  Pertinent Labs & Imaging studies reviewed. (See chart for details)    6:13 PM - Patient seen and examined at bedside. Patient will be treated with Decadron 6 mg. Ordered DX Chest, Lactic Acid, CBC with Differential, Blood Culture, CMP, Procalcitonin, and EKG  to evaluate his symptoms. The differential diagnoses include but are not limited to: Covid. I explained that admission is likely considering low saturation on room air and need for 6 L of oxygen to saturate in the 90's. I will await lab and radiology results before determining whether interventions are necessary. The patient is agreeable and understanding of my plan of care.     7:15 PM - Review of findings shows elevated WBC. Therefore I will treat with antibiotics.     7:20 PM - Treated with Unasyn 3 g in  mL and Zithromax 500 mg.    8:05 PM - Paged for Hospitalist.    8:16 PM - I spoke with Dr. Guillory  (Hospitalist) and updated them on the condition of the patient. They agree to admit the patient for further evaluation.     Decision Making:  Presents emerge department for evaluation.  Clinically the patient does have Covid on clinical evaluation chest x-ray does show the pneumonia pattern.  Procalcitonin is low but white count is elevated.  I did start the patient on Decadron for this oxygen is not 6 L via nasal cannula maintaining greater than 95%.  This point will admit the patient because of continued hypoxemia and COVID-19 pneumonia.  Have spoken to the hospitalist for this admission.    DISPOSITION:  Patient will be hospitalized by Dr. Dr. Guillory (Hospitalist) in guarded condition.     FINAL IMPRESSION  1. Pneumonia due to COVID-19 virus    2. Hypoxemia          IYang (Scribnils), am scribing for, and in the presence of, Johnathan Valencia M.D..    Electronically signed by: Yang Be (Ezraibe), 1/22/2021    IJohnathan M.D. personally performed the services described in this documentation, as scribed by Yang Be in my presence, and it is both accurate and complete.    C.    The note accurately reflects work and decisions made by me.  Johnathan Valencia M.D.  1/22/2021  10:08 PM

## 2021-01-24 LAB
ALBUMIN SERPL BCP-MCNC: 3.3 G/DL (ref 3.2–4.9)
ALBUMIN/GLOB SERPL: 0.8 G/DL
ALP SERPL-CCNC: 60 U/L (ref 30–99)
ALT SERPL-CCNC: 86 U/L (ref 2–50)
ANION GAP SERPL CALC-SCNC: 15 MMOL/L (ref 7–16)
AST SERPL-CCNC: 94 U/L (ref 12–45)
BASOPHILS # BLD AUTO: 0.1 % (ref 0–1.8)
BASOPHILS # BLD: 0.02 K/UL (ref 0–0.12)
BILIRUB SERPL-MCNC: 0.4 MG/DL (ref 0.1–1.5)
BUN SERPL-MCNC: 17 MG/DL (ref 8–22)
CALCIUM SERPL-MCNC: 8.9 MG/DL (ref 8.5–10.5)
CHLORIDE SERPL-SCNC: 99 MMOL/L (ref 96–112)
CO2 SERPL-SCNC: 21 MMOL/L (ref 20–33)
CREAT SERPL-MCNC: 0.73 MG/DL (ref 0.5–1.4)
EOSINOPHIL # BLD AUTO: 0 K/UL (ref 0–0.51)
EOSINOPHIL NFR BLD: 0 % (ref 0–6.9)
ERYTHROCYTE [DISTWIDTH] IN BLOOD BY AUTOMATED COUNT: 46.7 FL (ref 35.9–50)
GLOBULIN SER CALC-MCNC: 4.4 G/DL (ref 1.9–3.5)
GLUCOSE BLD-MCNC: 134 MG/DL (ref 65–99)
GLUCOSE BLD-MCNC: 150 MG/DL (ref 65–99)
GLUCOSE BLD-MCNC: 162 MG/DL (ref 65–99)
GLUCOSE SERPL-MCNC: 170 MG/DL (ref 65–99)
HCT VFR BLD AUTO: 43.6 % (ref 42–52)
HGB BLD-MCNC: 13.9 G/DL (ref 14–18)
IMM GRANULOCYTES # BLD AUTO: 0.11 K/UL (ref 0–0.11)
IMM GRANULOCYTES NFR BLD AUTO: 0.8 % (ref 0–0.9)
LYMPHOCYTES # BLD AUTO: 0.72 K/UL (ref 1–4.8)
LYMPHOCYTES NFR BLD: 5 % (ref 22–41)
MCH RBC QN AUTO: 27.4 PG (ref 27–33)
MCHC RBC AUTO-ENTMCNC: 31.9 G/DL (ref 33.7–35.3)
MCV RBC AUTO: 86 FL (ref 81.4–97.8)
MONOCYTES # BLD AUTO: 0.24 K/UL (ref 0–0.85)
MONOCYTES NFR BLD AUTO: 1.7 % (ref 0–13.4)
NEUTROPHILS # BLD AUTO: 13.41 K/UL (ref 1.82–7.42)
NEUTROPHILS NFR BLD: 92.4 % (ref 44–72)
NRBC # BLD AUTO: 0 K/UL
NRBC BLD-RTO: 0 /100 WBC
PLATELET # BLD AUTO: 520 K/UL (ref 164–446)
PMV BLD AUTO: 10.2 FL (ref 9–12.9)
POTASSIUM SERPL-SCNC: 4.5 MMOL/L (ref 3.6–5.5)
PROT SERPL-MCNC: 7.7 G/DL (ref 6–8.2)
RBC # BLD AUTO: 5.07 M/UL (ref 4.7–6.1)
SODIUM SERPL-SCNC: 135 MMOL/L (ref 135–145)
WBC # BLD AUTO: 14.5 K/UL (ref 4.8–10.8)

## 2021-01-24 PROCEDURE — 80053 COMPREHEN METABOLIC PANEL: CPT

## 2021-01-24 PROCEDURE — 85025 COMPLETE CBC W/AUTO DIFF WBC: CPT

## 2021-01-24 PROCEDURE — A9270 NON-COVERED ITEM OR SERVICE: HCPCS | Performed by: STUDENT IN AN ORGANIZED HEALTH CARE EDUCATION/TRAINING PROGRAM

## 2021-01-24 PROCEDURE — 700111 HCHG RX REV CODE 636 W/ 250 OVERRIDE (IP): Performed by: STUDENT IN AN ORGANIZED HEALTH CARE EDUCATION/TRAINING PROGRAM

## 2021-01-24 PROCEDURE — 82962 GLUCOSE BLOOD TEST: CPT | Mod: 91

## 2021-01-24 PROCEDURE — 99233 SBSQ HOSP IP/OBS HIGH 50: CPT | Performed by: STUDENT IN AN ORGANIZED HEALTH CARE EDUCATION/TRAINING PROGRAM

## 2021-01-24 PROCEDURE — 36415 COLL VENOUS BLD VENIPUNCTURE: CPT

## 2021-01-24 PROCEDURE — 700102 HCHG RX REV CODE 250 W/ 637 OVERRIDE(OP): Performed by: STUDENT IN AN ORGANIZED HEALTH CARE EDUCATION/TRAINING PROGRAM

## 2021-01-24 PROCEDURE — 770020 HCHG ROOM/CARE - TELE (206)

## 2021-01-24 RX ADMIN — DEXAMETHASONE SODIUM PHOSPHATE 6 MG: 4 INJECTION, SOLUTION INTRA-ARTICULAR; INTRALESIONAL; INTRAMUSCULAR; INTRAVENOUS; SOFT TISSUE at 05:24

## 2021-01-24 RX ADMIN — GUAIFENESIN 600 MG: 600 TABLET, EXTENDED RELEASE ORAL at 20:41

## 2021-01-24 RX ADMIN — THERA TABS 1 TABLET: TAB at 05:25

## 2021-01-24 RX ADMIN — AZITHROMYCIN MONOHYDRATE 500 MG: 250 TABLET ORAL at 17:11

## 2021-01-24 RX ADMIN — ENOXAPARIN SODIUM 40 MG: 40 INJECTION SUBCUTANEOUS at 05:25

## 2021-01-24 RX ADMIN — INSULIN HUMAN 1 UNITS: 100 INJECTION, SOLUTION PARENTERAL at 12:26

## 2021-01-24 RX ADMIN — INSULIN HUMAN 1 UNITS: 100 INJECTION, SOLUTION PARENTERAL at 20:42

## 2021-01-24 RX ADMIN — LOSARTAN POTASSIUM 50 MG: 50 TABLET, FILM COATED ORAL at 05:25

## 2021-01-24 RX ADMIN — TAMSULOSIN HYDROCHLORIDE 0.4 MG: 0.4 CAPSULE ORAL at 08:27

## 2021-01-24 NOTE — PROGRESS NOTES
Labs drawn, results pending, pt resting comfortably, 02 @ 6L/min nc, down to 4L/min nc, RRT aware. Will continue to monitor.

## 2021-01-24 NOTE — PROGRESS NOTES
Hospital Medicine Daily Progress Note    Date of Service  1/24/2021    Chief Complaint  45 y.o. male admitted 1/22/2021 with SOB, cough    Hospital Course  A 45-year-old man with h/o HTN, BPH, snoring, obesity, recently diagnosed COVID 19 infection (1/17/21) presented with worsening SOB, cough for one week.  Saturation was 74% on room air, improved to 97% on 6 L NC oxygen. Leukocytosis  13.1. CXR showed diffuse interstitial infiltrates concerning for COVID 19 infection.  Blood culture negative so far.    Interval Problem Update  Patient is afebrile, hemodynamically stable. On 6 L NC oxygen.  Sinus rhythm HR , PAC on telemonitor.  WBC 14.5 - most likely 2/2 steroids.    Consultants/Specialty  None    Code Status  Full Code    Disposition  TBD    Review of Systems  Review of Systems   Constitutional: Positive for malaise/fatigue. Negative for chills and fever.   HENT: Negative.    Eyes: Negative.    Respiratory: Positive for cough and shortness of breath.    Cardiovascular: Negative for chest pain.   Gastrointestinal: Negative for abdominal pain, nausea and vomiting.   Genitourinary: Negative for dysuria.   Musculoskeletal: Negative for myalgias.   Skin: Negative for rash.   Neurological: Positive for weakness. Negative for headaches.     Physical Exam  Temp:  [36.2 °C (97.1 °F)-37.2 °C (99 °F)] 37 °C (98.6 °F)  Pulse:  [] 91  Resp:  [18-21] 18  BP: (119-136)/(80-97) 130/88  SpO2:  [89 %-94 %] 94 %    Physical Exam  Vitals signs and nursing note reviewed.   Constitutional:       Appearance: He is obese. He is ill-appearing.   HENT:      Head: Normocephalic.      Mouth/Throat:      Mouth: Mucous membranes are moist.      Pharynx: Oropharynx is clear.   Eyes:      Pupils: Pupils are equal, round, and reactive to light.   Neck:      Musculoskeletal: Normal range of motion.   Cardiovascular:      Rate and Rhythm: Normal rate and regular rhythm.      Heart sounds: Normal heart sounds.   Pulmonary:      Effort:  Pulmonary effort is normal.      Breath sounds: Rales (b/l) present.   Abdominal:      General: Abdomen is flat. Bowel sounds are normal.      Tenderness: There is no abdominal tenderness.   Musculoskeletal: Normal range of motion.   Skin:     General: Skin is warm.   Neurological:      General: No focal deficit present.      Mental Status: He is alert and oriented to person, place, and time.     Fluids  No intake or output data in the 24 hours ending 01/24/21 1158    Laboratory  Recent Labs     01/22/21  1810 01/23/21  0716 01/24/21  0903   WBC 13.1* 8.4 14.5*   RBC 5.09 4.97 5.07   HEMOGLOBIN 13.8* 13.7* 13.9*   HEMATOCRIT 43.8 42.3 43.6   MCV 86.1 85.1 86.0   MCH 27.1 27.6 27.4   MCHC 31.5* 32.4* 31.9*   RDW 46.5 45.4 46.7   PLATELETCT 390 412 520*   MPV 10.5 11.2 10.2     Recent Labs     01/22/21  1810 01/23/21  0716 01/24/21  0903   SODIUM 137 138 135   POTASSIUM 3.8 4.4 4.5   CHLORIDE 100 103 99   CO2 25 22 21   GLUCOSE 147* 168* 170*   BUN 9 11 17   CREATININE 0.72 0.67 0.73   CALCIUM 8.7 8.6 8.9             Recent Labs     01/23/21  0716   TRIGLYCERIDE 111   HDL 39*   LDL 62       Imaging  DX-CHEST-PORTABLE (1 VIEW)   Final Result      Multifocal bilateral pneumonia.           Assessment/Plan  Acute hypoxemic respiratory failure due to COVID-19 (HCC)- (present on admission)  Assessment & Plan  COVID positive 1/17/21  Contact, droplet, eye precaution  Received azithromycin and Unasyn in the ER.  Continue dexamethasone 6 mg daily for 10 days.  Continue azithromycin for anti-inflammatory effects and possible atypical bacterial pneumonia in setting of normal procalcitonin level  Oxygen per protocol  Requiring 6 LPM NC oxygen    Elevated glucose- (present on admission)  Assessment & Plan  Hemoglobin A1c 7.5  Newly diagnosed DM  Diet consistent carbohydrates  POCT glucose  Sliding scale insulin  Diabetic education    HTN (hypertension)- (present on admission)  Assessment & Plan  Controlled  Continue home  losartan.    Numbness and tingling of right leg- (present on admission)  Assessment & Plan  Possibly due neuropathy  Consider gabapentin if persistent  D-dimer 1.21    Obesity (BMI 30.0-34.9)- (present on admission)  Assessment & Plan  BMI 34.   healthy lifestyle changes when stable       VTE prophylaxis: lovenox

## 2021-01-24 NOTE — PROGRESS NOTES
Received bedside shift change report and assumed care of pt from day shift RN. Pt resting quietly, in no apparent distress, denies pain offers no complaints. Tele-monitoring continues, 02 in progress @6L/min nc, respirations are even and unlabored. Droplet, contact and safety precautions maintained

## 2021-01-24 NOTE — PROGRESS NOTES
Report received from NOC Rn. Assumed pt care. Pt. Is on 6 L NC, 02 sat  91%.  services used for assessment. Pt A&O x 4. Fall precautions in place, call light and belongings within reach, bed in lowest position. No signs of distress.

## 2021-01-24 NOTE — PROGRESS NOTES
Report received from night shift RN, assumed patient care. Patient is A&O x 4, on 6 L NC O2. Tele box on and in place. Patient updated on plan of care and verbalizes no questions. Patient has call light within reach, fall precautions in place. Patient educated to call for assistance as needed. Will continue to monitor.

## 2021-01-25 LAB
ALBUMIN SERPL BCP-MCNC: 3.2 G/DL (ref 3.2–4.9)
ALBUMIN/GLOB SERPL: 0.8 G/DL
ALP SERPL-CCNC: 55 U/L (ref 30–99)
ALT SERPL-CCNC: 69 U/L (ref 2–50)
ANION GAP SERPL CALC-SCNC: 12 MMOL/L (ref 7–16)
AST SERPL-CCNC: 47 U/L (ref 12–45)
BASOPHILS # BLD AUTO: 0.1 % (ref 0–1.8)
BASOPHILS # BLD: 0.02 K/UL (ref 0–0.12)
BILIRUB SERPL-MCNC: 0.3 MG/DL (ref 0.1–1.5)
BUN SERPL-MCNC: 19 MG/DL (ref 8–22)
CALCIUM SERPL-MCNC: 8.4 MG/DL (ref 8.5–10.5)
CHLORIDE SERPL-SCNC: 101 MMOL/L (ref 96–112)
CO2 SERPL-SCNC: 25 MMOL/L (ref 20–33)
CREAT SERPL-MCNC: 0.61 MG/DL (ref 0.5–1.4)
EOSINOPHIL # BLD AUTO: 0.05 K/UL (ref 0–0.51)
EOSINOPHIL NFR BLD: 0.3 % (ref 0–6.9)
ERYTHROCYTE [DISTWIDTH] IN BLOOD BY AUTOMATED COUNT: 47.6 FL (ref 35.9–50)
GLOBULIN SER CALC-MCNC: 3.8 G/DL (ref 1.9–3.5)
GLUCOSE BLD-MCNC: 103 MG/DL (ref 65–99)
GLUCOSE BLD-MCNC: 113 MG/DL (ref 65–99)
GLUCOSE BLD-MCNC: 141 MG/DL (ref 65–99)
GLUCOSE SERPL-MCNC: 88 MG/DL (ref 65–99)
HCT VFR BLD AUTO: 40.8 % (ref 42–52)
HGB BLD-MCNC: 13.1 G/DL (ref 14–18)
IMM GRANULOCYTES # BLD AUTO: 0.15 K/UL (ref 0–0.11)
IMM GRANULOCYTES NFR BLD AUTO: 1 % (ref 0–0.9)
LYMPHOCYTES # BLD AUTO: 1.64 K/UL (ref 1–4.8)
LYMPHOCYTES NFR BLD: 11.2 % (ref 22–41)
MCH RBC QN AUTO: 28.2 PG (ref 27–33)
MCHC RBC AUTO-ENTMCNC: 32.1 G/DL (ref 33.7–35.3)
MCV RBC AUTO: 87.7 FL (ref 81.4–97.8)
MONOCYTES # BLD AUTO: 0.45 K/UL (ref 0–0.85)
MONOCYTES NFR BLD AUTO: 3.1 % (ref 0–13.4)
NEUTROPHILS # BLD AUTO: 12.37 K/UL (ref 1.82–7.42)
NEUTROPHILS NFR BLD: 84.3 % (ref 44–72)
NRBC # BLD AUTO: 0 K/UL
NRBC BLD-RTO: 0 /100 WBC
PLATELET # BLD AUTO: 538 K/UL (ref 164–446)
PMV BLD AUTO: 10.2 FL (ref 9–12.9)
POTASSIUM SERPL-SCNC: 3.8 MMOL/L (ref 3.6–5.5)
PROT SERPL-MCNC: 7 G/DL (ref 6–8.2)
RBC # BLD AUTO: 4.65 M/UL (ref 4.7–6.1)
SODIUM SERPL-SCNC: 138 MMOL/L (ref 135–145)
WBC # BLD AUTO: 14.7 K/UL (ref 4.8–10.8)

## 2021-01-25 PROCEDURE — 36415 COLL VENOUS BLD VENIPUNCTURE: CPT

## 2021-01-25 PROCEDURE — A9270 NON-COVERED ITEM OR SERVICE: HCPCS | Performed by: STUDENT IN AN ORGANIZED HEALTH CARE EDUCATION/TRAINING PROGRAM

## 2021-01-25 PROCEDURE — 82962 GLUCOSE BLOOD TEST: CPT

## 2021-01-25 PROCEDURE — 700111 HCHG RX REV CODE 636 W/ 250 OVERRIDE (IP): Performed by: STUDENT IN AN ORGANIZED HEALTH CARE EDUCATION/TRAINING PROGRAM

## 2021-01-25 PROCEDURE — 770020 HCHG ROOM/CARE - TELE (206)

## 2021-01-25 PROCEDURE — 85025 COMPLETE CBC W/AUTO DIFF WBC: CPT

## 2021-01-25 PROCEDURE — 80053 COMPREHEN METABOLIC PANEL: CPT

## 2021-01-25 PROCEDURE — 700102 HCHG RX REV CODE 250 W/ 637 OVERRIDE(OP): Performed by: STUDENT IN AN ORGANIZED HEALTH CARE EDUCATION/TRAINING PROGRAM

## 2021-01-25 PROCEDURE — 99233 SBSQ HOSP IP/OBS HIGH 50: CPT | Performed by: STUDENT IN AN ORGANIZED HEALTH CARE EDUCATION/TRAINING PROGRAM

## 2021-01-25 RX ADMIN — TAMSULOSIN HYDROCHLORIDE 0.4 MG: 0.4 CAPSULE ORAL at 08:29

## 2021-01-25 RX ADMIN — ENOXAPARIN SODIUM 40 MG: 40 INJECTION SUBCUTANEOUS at 05:43

## 2021-01-25 RX ADMIN — ACETAMINOPHEN 650 MG: 325 TABLET ORAL at 13:35

## 2021-01-25 RX ADMIN — LOSARTAN POTASSIUM 50 MG: 50 TABLET, FILM COATED ORAL at 05:44

## 2021-01-25 RX ADMIN — DEXAMETHASONE SODIUM PHOSPHATE 6 MG: 4 INJECTION, SOLUTION INTRA-ARTICULAR; INTRALESIONAL; INTRAMUSCULAR; INTRAVENOUS; SOFT TISSUE at 05:43

## 2021-01-25 RX ADMIN — THERA TABS 1 TABLET: TAB at 05:44

## 2021-01-25 RX ADMIN — GUAIFENESIN 600 MG: 600 TABLET, EXTENDED RELEASE ORAL at 05:44

## 2021-01-25 ASSESSMENT — FIBROSIS 4 INDEX
FIB4 SCORE: 0.49
FIB4 SCORE: 0.49

## 2021-01-25 ASSESSMENT — PAIN DESCRIPTION - PAIN TYPE: TYPE: ACUTE PAIN

## 2021-01-25 NOTE — PROGRESS NOTES
Hospital Medicine Daily Progress Note    Date of Service  1/25/2021    Chief Complaint  45 y.o. male admitted 1/22/2021 with SOB, cough    Hospital Course  A 45-year-old man with h/o HTN, BPH, snoring, obesity, recently diagnosed COVID 19 infection (1/17/21) presented with worsening SOB, cough for one week.  Saturation was 74% on room air, improved to 97% on 6 L NC oxygen. Leukocytosis  13.1. CXR showed diffuse interstitial infiltrates concerning for COVID 19 infection.  Blood culture negative so far.    Interval Problem Update  Reports SOB, cough, malaise.  Afebrile, hemodynamically stable. On 6 L NC.  WBC 14.7 - likely due to steroids.    Consultants/Specialty  None    Code Status  Full Code    Disposition  TBD    Review of Systems  Review of Systems   Constitutional: Positive for malaise/fatigue. Negative for chills and fever.   HENT: Negative.    Eyes: Negative.    Respiratory: Positive for cough and shortness of breath.    Cardiovascular: Negative for chest pain.   Gastrointestinal: Negative for abdominal pain, nausea and vomiting.   Genitourinary: Negative for dysuria.   Musculoskeletal: Negative for myalgias.   Skin: Negative for rash.   Neurological: Positive for weakness. Negative for headaches.     Physical Exam  Temp:  [36.3 °C (97.4 °F)-36.9 °C (98.4 °F)] 36.8 °C (98.2 °F)  Pulse:  [85-98] 85  Resp:  [16-20] 18  BP: (118-143)/(76-85) 121/76  SpO2:  [86 %-94 %] 90 %    Physical Exam  Vitals signs and nursing note reviewed.   Constitutional:       Appearance: He is obese. He is ill-appearing.   HENT:      Head: Normocephalic.      Mouth/Throat:      Mouth: Mucous membranes are moist.      Pharynx: Oropharynx is clear.   Eyes:      Pupils: Pupils are equal, round, and reactive to light.   Neck:      Musculoskeletal: Normal range of motion.   Cardiovascular:      Rate and Rhythm: Normal rate and regular rhythm.      Heart sounds: Normal heart sounds.   Pulmonary:      Effort: Pulmonary effort is normal.       Breath sounds: Rales (b/l) present.   Abdominal:      General: Abdomen is flat. Bowel sounds are normal.      Tenderness: There is no abdominal tenderness.   Musculoskeletal: Normal range of motion.   Skin:     General: Skin is warm.   Neurological:      General: No focal deficit present.      Mental Status: He is alert and oriented to person, place, and time.     Fluids    Intake/Output Summary (Last 24 hours) at 1/25/2021 1305  Last data filed at 1/25/2021 0400  Gross per 24 hour   Intake 360 ml   Output 700 ml   Net -340 ml       Laboratory  Recent Labs     01/23/21  0716 01/24/21  0903 01/25/21  0501   WBC 8.4 14.5* 14.7*   RBC 4.97 5.07 4.65*   HEMOGLOBIN 13.7* 13.9* 13.1*   HEMATOCRIT 42.3 43.6 40.8*   MCV 85.1 86.0 87.7   MCH 27.6 27.4 28.2   MCHC 32.4* 31.9* 32.1*   RDW 45.4 46.7 47.6   PLATELETCT 412 520* 538*   MPV 11.2 10.2 10.2     Recent Labs     01/23/21  0716 01/24/21  0903 01/25/21  0501   SODIUM 138 135 138   POTASSIUM 4.4 4.5 3.8   CHLORIDE 103 99 101   CO2 22 21 25   GLUCOSE 168* 170* 88   BUN 11 17 19   CREATININE 0.67 0.73 0.61   CALCIUM 8.6 8.9 8.4*             Recent Labs     01/23/21  0716   TRIGLYCERIDE 111   HDL 39*   LDL 62       Imaging  DX-CHEST-PORTABLE (1 VIEW)   Final Result      Multifocal bilateral pneumonia.           Assessment/Plan  Acute hypoxemic respiratory failure due to COVID-19 (HCC)- (present on admission)  Assessment & Plan  COVID positive 1/17/21  Contact, droplet, eye precaution  Received azithromycin and Unasyn in the ER.  Continue dexamethasone 6 mg daily for 10 days.  Continue azithromycin for anti-inflammatory effects and possible atypical bacterial pneumonia in setting of normal procalcitonin level  Oxygen per protocol  Requiring 6 LPM NC oxygen    Elevated glucose- (present on admission)  Assessment & Plan  Hemoglobin A1c 7.5  Newly diagnosed DM  Diet consistent carbohydrates  POCT glucose  Sliding scale insulin  Diabetic education    HTN (hypertension)-  (present on admission)  Assessment & Plan  Controlled  Continue home losartan.    Numbness and tingling of right leg- (present on admission)  Assessment & Plan  Possibly due neuropathy  Consider gabapentin if persistent  D-dimer 1.21    Obesity (BMI 30.0-34.9)- (present on admission)  Assessment & Plan  BMI 34.   healthy lifestyle changes when stable       VTE prophylaxis: lovenox

## 2021-01-25 NOTE — PROGRESS NOTES
Spoke with pt. In regards to flu vaccine. Per pt. He would like to wait to receive vaccine on day of discharge. Wants to wait until he is better from covid. Relayed message to charge nurse will pass on in report. Pt. Denies needs. Call light is in reach.

## 2021-01-25 NOTE — PROGRESS NOTES
Assessment completed.oxygen in place and at 6liters/min; spo2 wnl. C/o cough/congestion; medicated with ordered prn mucinex; will reassess. Snack provided and insulin given as per protocol; po fluids offered; encouraged to call for any needs. Call light is in reach. Will continue to monitor. Denies further needs.

## 2021-01-25 NOTE — PROGRESS NOTES
Report received from noc shift RN, assumed patient care. Patient is A&O x 4 on 5 L C O2. Tele box on and in place. Patient denies any pain at this time. Patient updated on plan of care and verbalizes no questions. Patient has call light within reach, fall precautions in place. Patient educated to call for assistance as needed. Will continue to monitor.

## 2021-01-25 NOTE — PROGRESS NOTES
Titrated down o2 to 5liters/min via nc; instructed pt. If he got sob to notify staff; verbalizes understanding. Will continue to monitor.

## 2021-01-26 ENCOUNTER — APPOINTMENT (OUTPATIENT)
Dept: RADIOLOGY | Facility: MEDICAL CENTER | Age: 46
DRG: 853 | End: 2021-01-26
Attending: INTERNAL MEDICINE
Payer: OTHER MISCELLANEOUS

## 2021-01-26 ENCOUNTER — PATIENT OUTREACH (OUTPATIENT)
Dept: HEALTH INFORMATION MANAGEMENT | Facility: OTHER | Age: 46
End: 2021-01-26

## 2021-01-26 LAB
ALBUMIN SERPL BCP-MCNC: 3.2 G/DL (ref 3.2–4.9)
ALBUMIN/GLOB SERPL: 0.8 G/DL
ALP SERPL-CCNC: 57 U/L (ref 30–99)
ALT SERPL-CCNC: 61 U/L (ref 2–50)
ANION GAP SERPL CALC-SCNC: 12 MMOL/L (ref 7–16)
AST SERPL-CCNC: 32 U/L (ref 12–45)
BASOPHILS # BLD AUTO: 0.3 % (ref 0–1.8)
BASOPHILS # BLD: 0.04 K/UL (ref 0–0.12)
BILIRUB SERPL-MCNC: 0.4 MG/DL (ref 0.1–1.5)
BUN SERPL-MCNC: 15 MG/DL (ref 8–22)
CALCIUM SERPL-MCNC: 8.3 MG/DL (ref 8.5–10.5)
CHLORIDE SERPL-SCNC: 99 MMOL/L (ref 96–112)
CO2 SERPL-SCNC: 23 MMOL/L (ref 20–33)
CREAT SERPL-MCNC: 0.63 MG/DL (ref 0.5–1.4)
EOSINOPHIL # BLD AUTO: 0.11 K/UL (ref 0–0.51)
EOSINOPHIL NFR BLD: 0.7 % (ref 0–6.9)
ERYTHROCYTE [DISTWIDTH] IN BLOOD BY AUTOMATED COUNT: 45.3 FL (ref 35.9–50)
GLOBULIN SER CALC-MCNC: 3.8 G/DL (ref 1.9–3.5)
GLUCOSE BLD-MCNC: 165 MG/DL (ref 65–99)
GLUCOSE BLD-MCNC: 166 MG/DL (ref 65–99)
GLUCOSE SERPL-MCNC: 97 MG/DL (ref 65–99)
HCT VFR BLD AUTO: 41.3 % (ref 42–52)
HGB BLD-MCNC: 13.3 G/DL (ref 14–18)
IMM GRANULOCYTES # BLD AUTO: 0.21 K/UL (ref 0–0.11)
IMM GRANULOCYTES NFR BLD AUTO: 1.4 % (ref 0–0.9)
LYMPHOCYTES # BLD AUTO: 1.55 K/UL (ref 1–4.8)
LYMPHOCYTES NFR BLD: 10 % (ref 22–41)
MCH RBC QN AUTO: 27.7 PG (ref 27–33)
MCHC RBC AUTO-ENTMCNC: 32.2 G/DL (ref 33.7–35.3)
MCV RBC AUTO: 85.9 FL (ref 81.4–97.8)
MONOCYTES # BLD AUTO: 0.45 K/UL (ref 0–0.85)
MONOCYTES NFR BLD AUTO: 2.9 % (ref 0–13.4)
NEUTROPHILS # BLD AUTO: 13.18 K/UL (ref 1.82–7.42)
NEUTROPHILS NFR BLD: 84.7 % (ref 44–72)
NRBC # BLD AUTO: 0 K/UL
NRBC BLD-RTO: 0 /100 WBC
PLATELET # BLD AUTO: 588 K/UL (ref 164–446)
PMV BLD AUTO: 10 FL (ref 9–12.9)
POTASSIUM SERPL-SCNC: 3.7 MMOL/L (ref 3.6–5.5)
PROT SERPL-MCNC: 7 G/DL (ref 6–8.2)
RBC # BLD AUTO: 4.81 M/UL (ref 4.7–6.1)
SODIUM SERPL-SCNC: 134 MMOL/L (ref 135–145)
WBC # BLD AUTO: 15.5 K/UL (ref 4.8–10.8)

## 2021-01-26 PROCEDURE — 99232 SBSQ HOSP IP/OBS MODERATE 35: CPT | Performed by: INTERNAL MEDICINE

## 2021-01-26 PROCEDURE — A9270 NON-COVERED ITEM OR SERVICE: HCPCS | Performed by: STUDENT IN AN ORGANIZED HEALTH CARE EDUCATION/TRAINING PROGRAM

## 2021-01-26 PROCEDURE — 770020 HCHG ROOM/CARE - TELE (206)

## 2021-01-26 PROCEDURE — 700117 HCHG RX CONTRAST REV CODE 255: Performed by: INTERNAL MEDICINE

## 2021-01-26 PROCEDURE — 700102 HCHG RX REV CODE 250 W/ 637 OVERRIDE(OP): Performed by: INTERNAL MEDICINE

## 2021-01-26 PROCEDURE — 85025 COMPLETE CBC W/AUTO DIFF WBC: CPT

## 2021-01-26 PROCEDURE — 70496 CT ANGIOGRAPHY HEAD: CPT

## 2021-01-26 PROCEDURE — 700102 HCHG RX REV CODE 250 W/ 637 OVERRIDE(OP): Performed by: STUDENT IN AN ORGANIZED HEALTH CARE EDUCATION/TRAINING PROGRAM

## 2021-01-26 PROCEDURE — A9270 NON-COVERED ITEM OR SERVICE: HCPCS | Performed by: INTERNAL MEDICINE

## 2021-01-26 PROCEDURE — 80053 COMPREHEN METABOLIC PANEL: CPT

## 2021-01-26 PROCEDURE — 700111 HCHG RX REV CODE 636 W/ 250 OVERRIDE (IP): Performed by: INTERNAL MEDICINE

## 2021-01-26 PROCEDURE — 36415 COLL VENOUS BLD VENIPUNCTURE: CPT

## 2021-01-26 PROCEDURE — 82962 GLUCOSE BLOOD TEST: CPT

## 2021-01-26 PROCEDURE — 700111 HCHG RX REV CODE 636 W/ 250 OVERRIDE (IP): Performed by: STUDENT IN AN ORGANIZED HEALTH CARE EDUCATION/TRAINING PROGRAM

## 2021-01-26 PROCEDURE — 70498 CT ANGIOGRAPHY NECK: CPT

## 2021-01-26 RX ORDER — ASPIRIN 325 MG
325 TABLET ORAL DAILY
Status: DISCONTINUED | OUTPATIENT
Start: 2021-01-26 | End: 2021-01-28

## 2021-01-26 RX ORDER — POTASSIUM CHLORIDE 20 MEQ/1
40 TABLET, EXTENDED RELEASE ORAL ONCE
Status: COMPLETED | OUTPATIENT
Start: 2021-01-26 | End: 2021-01-26

## 2021-01-26 RX ORDER — ATORVASTATIN CALCIUM 80 MG/1
80 TABLET, FILM COATED ORAL EVERY EVENING
Status: DISCONTINUED | OUTPATIENT
Start: 2021-01-26 | End: 2021-02-01 | Stop reason: HOSPADM

## 2021-01-26 RX ORDER — FUROSEMIDE 10 MG/ML
20 INJECTION INTRAMUSCULAR; INTRAVENOUS
Status: DISCONTINUED | OUTPATIENT
Start: 2021-01-26 | End: 2021-01-26

## 2021-01-26 RX ADMIN — INSULIN HUMAN 1 UNITS: 100 INJECTION, SOLUTION PARENTERAL at 17:35

## 2021-01-26 RX ADMIN — INSULIN HUMAN 1 UNITS: 100 INJECTION, SOLUTION PARENTERAL at 12:18

## 2021-01-26 RX ADMIN — ENOXAPARIN SODIUM 40 MG: 40 INJECTION SUBCUTANEOUS at 05:43

## 2021-01-26 RX ADMIN — LOSARTAN POTASSIUM 50 MG: 50 TABLET, FILM COATED ORAL at 05:42

## 2021-01-26 RX ADMIN — FUROSEMIDE 20 MG: 10 INJECTION, SOLUTION INTRAMUSCULAR; INTRAVENOUS at 12:15

## 2021-01-26 RX ADMIN — ATORVASTATIN CALCIUM 80 MG: 80 TABLET, FILM COATED ORAL at 23:26

## 2021-01-26 RX ADMIN — ACETAMINOPHEN 650 MG: 325 TABLET ORAL at 08:54

## 2021-01-26 RX ADMIN — IOHEXOL 80 ML: 350 INJECTION, SOLUTION INTRAVENOUS at 21:41

## 2021-01-26 RX ADMIN — DEXAMETHASONE SODIUM PHOSPHATE 6 MG: 4 INJECTION, SOLUTION INTRA-ARTICULAR; INTRALESIONAL; INTRAMUSCULAR; INTRAVENOUS; SOFT TISSUE at 08:55

## 2021-01-26 RX ADMIN — GUAIFENESIN 600 MG: 600 TABLET, EXTENDED RELEASE ORAL at 08:53

## 2021-01-26 RX ADMIN — TAMSULOSIN HYDROCHLORIDE 0.4 MG: 0.4 CAPSULE ORAL at 08:54

## 2021-01-26 RX ADMIN — POTASSIUM CHLORIDE 40 MEQ: 1500 TABLET, EXTENDED RELEASE ORAL at 17:30

## 2021-01-26 RX ADMIN — ASPIRIN 325 MG: 325 TABLET ORAL at 23:26

## 2021-01-26 RX ADMIN — THERA TABS 1 TABLET: TAB at 05:43

## 2021-01-26 SDOH — ECONOMIC STABILITY: TRANSPORTATION INSECURITY
IN THE PAST 12 MONTHS, HAS THE LACK OF TRANSPORTATION KEPT YOU FROM MEDICAL APPOINTMENTS OR FROM GETTING MEDICATIONS?: NO

## 2021-01-26 SDOH — ECONOMIC STABILITY: FOOD INSECURITY: WITHIN THE PAST 12 MONTHS, THE FOOD YOU BOUGHT JUST DIDN'T LAST AND YOU DIDN'T HAVE MONEY TO GET MORE.: NEVER TRUE

## 2021-01-26 SDOH — ECONOMIC STABILITY: INCOME INSECURITY: HOW HARD IS IT FOR YOU TO PAY FOR THE VERY BASICS LIKE FOOD, HOUSING, MEDICAL CARE, AND HEATING?: NOT HARD AT ALL

## 2021-01-26 SDOH — ECONOMIC STABILITY: FOOD INSECURITY: WITHIN THE PAST 12 MONTHS, YOU WORRIED THAT YOUR FOOD WOULD RUN OUT BEFORE YOU GOT MONEY TO BUY MORE.: NEVER TRUE

## 2021-01-26 SDOH — ECONOMIC STABILITY: TRANSPORTATION INSECURITY
IN THE PAST 12 MONTHS, HAS LACK OF TRANSPORTATION KEPT YOU FROM MEETINGS, WORK, OR FROM GETTING THINGS NEEDED FOR DAILY LIVING?: NO

## 2021-01-26 NOTE — DOCUMENTATION QUERY
[facility]                                                                       Query Response Note      PATIENT:               ADDIE OHARA  ACCT #:                  [id number]  MRN:                     [id number]  :                      1975  ADMIT DATE:       2021 6:07 PM  DISCH DATE:          RESPONDING  PROVIDER #:        [id number]           QUERY TEXT:    COVID 19 infection and possible atypical bacterial pneumonia has been documented in the Medical Record. In addition, the CXR reports multifocal bilateral pneumonia. Can the diagnosis of pneumonia be clarified?    NOTE:  If an appropriate response is not listed below, please respond with a new note.    The patient's Clinical Indicators include:  Per Progress Notes   -Acute hypoxemic respiratory failure due to COVID-19  -Continue azithromycin for anti-inflammatory effects & possible atypical bacterial pneumonia in setting of normal procalcitonin level.     Results Review:   WBC 13.1, 8.4, 14.5, 14.7, 15.5  Lactic acid 2.5, 1.3  Procalcitonin 0.18  CXR: Multifocal bilateral pneumonia.    Treatment:   Decadron, Mucinex, Unasyn, azithromycin, RT Consult, supplemental O2 5-8L via N/C, inflammatory markers, & close clinical monitoring     Risk Factors:   COVID 19 infection, acute hypoxemic respiratory failure, heart rate >90, respiratory rate > 20, newly diagnosed DM2, & hx of obesity & HTN    Thank You,  Jackeline Shabazz RN BSN  Clinical   Connect via [employer]  Options provided:   -- COVID 19 pneumonia with suspected/likely superimposed bacterial pneumonia   -- COVID 19 pneumonia with suspected/likely aspiration pneumonia   -- COVID 19 pneumonia with suspected/likely other type of pneumonia, (Please specify other type of pneumonia)   -- COVID 19 pneumonia only   -- Pneumonia ruled out   -- Unable to determine      Query created by:  Jackeline Shabazz on 1/26/2021 12:25 PM    RESPONSE TEXT:    COVID 19 pneumonia only          Electronically signed by:  NANNETTE KHAN MD 1/26/2021 3:05 PM

## 2021-01-26 NOTE — PROGRESS NOTES
Community Health Worker Intake    • Social determinates of health intake: comepleted.   • Identified barriers to: No PCP.  • Contact information provided to Herrera Barros   • Accepted Meds-To-Beds.   • Inpatient assessment completed.    CHW Prem reached out to pt via bedside TC to introduce CCM services. Pt accepted. He mentioned not having a PCP and declined that CHW assist in getting him established with one. Pt states he wants to go to the same clinic his father goes to but cannot remember the name of it. He accepted that CHW include [facility] info in follow-up section either way. Pt is an active  and can drive himself to appts. He does not have any financial barriers at the moment. Pt lives with his father in a house and has a good support system consisting of his family. He mentioned not needing additional resources/services at this time.     Plan: F/u after d/c to identify any additional needs and remind to get established with a PCP.

## 2021-01-26 NOTE — PROGRESS NOTES
Report received from noc shift RN, assumed patient care. Patient is A&O x 4, on 5 L O2. Tele box on and in place. Patient updated on plan of care and verbalizes no questions. Patient has call light within reach, fall precautions in place. Patient educated to call for assistance as needed. Will continue to monitor.

## 2021-01-26 NOTE — PROGRESS NOTES
Report received from Jinny RN, assumed care of pt. Plan of care discussed with pt, labs and chart reviewed. All needs met at this time. Tele box on. Call light within reach, bed locked and in lowest position. All fall precautions and hourly rounding in place. Will continue to monitor.

## 2021-01-26 NOTE — FACE TO FACE
"Face to Face Note  -  Durable Medical Equipment    Riley Winkler D.O. - NPI: [id number]  I certify that this patient is under my care and that they had a durable medical equipment(DME)face to face encounter by myself that meets the physician DME face-to-face encounter requirements with this patient on:    Date of encounter:   Patient:                    MRN:                       YOB: 2023  Herrera Barros  [id number]  1975     The encounter with the patient was in whole, or in part, for the following medical condition, which is the primary reason for durable medical equipment:  Other - acute hypoxemic respiratory failure due to COVID    I certify that, based on my findings, the following durable medical equipment is medically necessary:  Oxygen.    HOME O2 Saturation Measurements:(Values must be present for Home Oxygen orders)         ,     ,         My Clinical findings support the need for the above equipment due to:  Hypoxia    Supporting Symptoms: The patient requires supplemental oxygen, as the following interventions have been tried with limited or no improvement: \"Oral and/or IV steroids, \"Ambulation with oximetry and \"Incentive spirometry    If patient feels more short of breath, they can go up to 6 liters per minute and contact healthcare provider.  "

## 2021-01-26 NOTE — PROGRESS NOTES
Hospital Medicine Daily Progress Note    Date of Service  1/26/2021    Chief Complaint  45 y.o. male admitted 1/22/2021 with SOB, cough    Hospital Course  A 45-year-old man with h/o HTN, BPH, snoring, obesity, recently diagnosed COVID 19 infection (1/17/21) presented with worsening SOB, cough for one week.  Saturation was 74% on room air, improved to 97% on 6 L NC oxygen. Leukocytosis  13.1. CXR showed diffuse interstitial infiltrates concerning for COVID 19 infection.  Blood culture negative so far.    Interval Problem Update  Patient was seen and examined at bedside.  No acute events overnight. Patient is resting comfortably in bed and in no acute distress. Patient was interviewed and examined with assistance of  service.    Decadron day 4/10  Lasix 20mg daily  5L nasal cannula    Consultants/Specialty  None    Code Status  Full Code    Disposition  TBD    Review of Systems  Review of Systems   Constitutional: Positive for malaise/fatigue. Negative for chills and fever.   HENT: Negative.    Eyes: Negative.    Respiratory: Positive for cough and shortness of breath.    Cardiovascular: Negative for chest pain.   Gastrointestinal: Negative for abdominal pain, nausea and vomiting.   Genitourinary: Negative for dysuria.   Musculoskeletal: Negative for myalgias.   Skin: Negative for rash.   Neurological: Positive for weakness. Negative for headaches.     Physical Exam  Temp:  [36.5 °C (97.7 °F)-37.4 °C (99.4 °F)] 37.4 °C (99.4 °F)  Pulse:  [] 92  Resp:  [18-21] 18  BP: (119-150)/(76-91) 119/81  SpO2:  [89 %-92 %] 90 %    Physical Exam  Vitals signs and nursing note reviewed.   Constitutional:       Appearance: He is obese. He is ill-appearing.   HENT:      Head: Normocephalic.      Mouth/Throat: no erythema, no confestion     Mouth: Mucous membranes are moist.      Pharynx: Oropharynx is clear.   Eyes:      Pupils: Pupils are equal, round, and reactive to light.   Neck:      Musculoskeletal: Normal  range of motion.   Cardiovascular:      Rate and Rhythm: Normal rate and regular rhythm.      Heart sounds: Normal heart sounds.   Pulmonary:      Effort: Pulmonary effort is normal.      Breath sounds: faint crackles auscultated in lower lung bases bilaterally  Abdominal:      General: Abdomen is flat. Bowel sounds are normal.      Tenderness: There is no abdominal tenderness.   Musculoskeletal: Normal range of motion.   Skin:     General: Skin is warm.   Neurological:      General: No focal deficit present.      Mental Status: He is alert and oriented to person, place, and time.     Fluids    Intake/Output Summary (Last 24 hours) at 1/26/2021 1024  Last data filed at 1/25/2021 2100  Gross per 24 hour   Intake 390 ml   Output --   Net 390 ml       Laboratory  Recent Labs     01/24/21  0903 01/25/21  0501 01/26/21  0446   WBC 14.5* 14.7* 15.5*   RBC 5.07 4.65* 4.81   HEMOGLOBIN 13.9* 13.1* 13.3*   HEMATOCRIT 43.6 40.8* 41.3*   MCV 86.0 87.7 85.9   MCH 27.4 28.2 27.7   MCHC 31.9* 32.1* 32.2*   RDW 46.7 47.6 45.3   PLATELETCT 520* 538* 588*   MPV 10.2 10.2 10.0     Recent Labs     01/24/21  0903 01/25/21  0501 01/26/21  0446   SODIUM 135 138 134*   POTASSIUM 4.5 3.8 3.7   CHLORIDE 99 101 99   CO2 21 25 23   GLUCOSE 170* 88 97   BUN 17 19 15   CREATININE 0.73 0.61 0.63   CALCIUM 8.9 8.4* 8.3*                   Imaging  DX-CHEST-PORTABLE (1 VIEW)   Final Result      Multifocal bilateral pneumonia.           Assessment/Plan  Acute hypoxemic respiratory failure due to COVID-19 (HCC)- (present on admission)  Assessment & Plan  COVID positive 1/17/21  Contact, droplet, eye precaution  Oxygen per protocol  Decadron day 4/10  Lasix 20mg daily  Trend inflammatory markers  5L nasal cannula    Elevated glucose- (present on admission)  Assessment & Plan  Hemoglobin A1c 7.5  Newly diagnosed DM  Diet consistent carbohydrates  POCT glucose  Sliding scale insulin  Diabetic education    HTN (hypertension)- (present on  admission)  Assessment & Plan  Controlled  Continue home losartan.    Numbness and tingling of right leg- (present on admission)  Assessment & Plan  Possibly due neuropathy  Consider gabapentin if persistent  D-dimer 1.21    Obesity (BMI 30.0-34.9)- (present on admission)  Assessment & Plan  BMI 34.   healthy lifestyle changes when stable     VTE prophylaxis: lovenox

## 2021-01-27 ENCOUNTER — APPOINTMENT (OUTPATIENT)
Dept: RADIOLOGY | Facility: MEDICAL CENTER | Age: 46
DRG: 853 | End: 2021-01-27
Attending: SURGERY
Payer: OTHER MISCELLANEOUS

## 2021-01-27 ENCOUNTER — APPOINTMENT (OUTPATIENT)
Dept: RADIOLOGY | Facility: MEDICAL CENTER | Age: 46
DRG: 853 | End: 2021-01-27
Attending: INTERNAL MEDICINE
Payer: OTHER MISCELLANEOUS

## 2021-01-27 ENCOUNTER — ANESTHESIA EVENT (OUTPATIENT)
Dept: SURGERY | Facility: MEDICAL CENTER | Age: 46
DRG: 853 | End: 2021-01-27
Payer: OTHER MISCELLANEOUS

## 2021-01-27 PROBLEM — I65.21: Status: ACTIVE | Noted: 2021-01-27

## 2021-01-27 LAB
ALBUMIN SERPL BCP-MCNC: 3.3 G/DL (ref 3.2–4.9)
ALBUMIN/GLOB SERPL: 0.8 G/DL
ALP SERPL-CCNC: 59 U/L (ref 30–99)
ALT SERPL-CCNC: 53 U/L (ref 2–50)
ANION GAP SERPL CALC-SCNC: 17 MMOL/L (ref 7–16)
APTT PPP: 28.4 SEC (ref 24.7–36)
APTT PPP: 35.4 SEC (ref 24.7–36)
AST SERPL-CCNC: 37 U/L (ref 12–45)
BACTERIA BLD CULT: NORMAL
BACTERIA BLD CULT: NORMAL
BASOPHILS # BLD AUTO: 0.4 % (ref 0–1.8)
BASOPHILS # BLD: 0.05 K/UL (ref 0–0.12)
BILIRUB SERPL-MCNC: 0.4 MG/DL (ref 0.1–1.5)
BUN SERPL-MCNC: 17 MG/DL (ref 8–22)
CALCIUM SERPL-MCNC: 9 MG/DL (ref 8.5–10.5)
CHLORIDE SERPL-SCNC: 98 MMOL/L (ref 96–112)
CO2 SERPL-SCNC: 17 MMOL/L (ref 20–33)
CREAT SERPL-MCNC: 0.69 MG/DL (ref 0.5–1.4)
CRP SERPL HS-MCNC: 12.1 MG/DL (ref 0–0.75)
D DIMER PPP IA.FEU-MCNC: 1.79 UG/ML (FEU) (ref 0–0.5)
EOSINOPHIL # BLD AUTO: 0.22 K/UL (ref 0–0.51)
EOSINOPHIL NFR BLD: 1.7 % (ref 0–6.9)
ERYTHROCYTE [DISTWIDTH] IN BLOOD BY AUTOMATED COUNT: 43.8 FL (ref 35.9–50)
GLOBULIN SER CALC-MCNC: 4.3 G/DL (ref 1.9–3.5)
GLUCOSE BLD-MCNC: 122 MG/DL (ref 65–99)
GLUCOSE BLD-MCNC: 152 MG/DL (ref 65–99)
GLUCOSE BLD-MCNC: 158 MG/DL (ref 65–99)
GLUCOSE BLD-MCNC: 177 MG/DL (ref 65–99)
GLUCOSE BLD-MCNC: 95 MG/DL (ref 65–99)
GLUCOSE SERPL-MCNC: 85 MG/DL (ref 65–99)
HCT VFR BLD AUTO: 42.8 % (ref 42–52)
HGB BLD-MCNC: 13.8 G/DL (ref 14–18)
IMM GRANULOCYTES # BLD AUTO: 0.28 K/UL (ref 0–0.11)
IMM GRANULOCYTES NFR BLD AUTO: 2.2 % (ref 0–0.9)
INR PPP: 1.07 (ref 0.87–1.13)
LYMPHOCYTES # BLD AUTO: 1.92 K/UL (ref 1–4.8)
LYMPHOCYTES NFR BLD: 15.1 % (ref 22–41)
MAGNESIUM SERPL-MCNC: 2.2 MG/DL (ref 1.5–2.5)
MCH RBC QN AUTO: 27.5 PG (ref 27–33)
MCHC RBC AUTO-ENTMCNC: 32.2 G/DL (ref 33.7–35.3)
MCV RBC AUTO: 85.4 FL (ref 81.4–97.8)
MONOCYTES # BLD AUTO: 0.41 K/UL (ref 0–0.85)
MONOCYTES NFR BLD AUTO: 3.2 % (ref 0–13.4)
NEUTROPHILS # BLD AUTO: 9.83 K/UL (ref 1.82–7.42)
NEUTROPHILS NFR BLD: 77.4 % (ref 44–72)
NRBC # BLD AUTO: 0 K/UL
NRBC BLD-RTO: 0 /100 WBC
PHOSPHATE SERPL-MCNC: 3.7 MG/DL (ref 2.5–4.5)
PLATELET # BLD AUTO: 733 K/UL (ref 164–446)
PMV BLD AUTO: 9.9 FL (ref 9–12.9)
POTASSIUM SERPL-SCNC: 4.1 MMOL/L (ref 3.6–5.5)
PROCALCITONIN SERPL-MCNC: <0.05 NG/ML
PROT SERPL-MCNC: 7.6 G/DL (ref 6–8.2)
PROTHROMBIN TIME: 14.2 SEC (ref 12–14.6)
RBC # BLD AUTO: 5.01 M/UL (ref 4.7–6.1)
SIGNIFICANT IND 70042: NORMAL
SIGNIFICANT IND 70042: NORMAL
SITE SITE: NORMAL
SITE SITE: NORMAL
SODIUM SERPL-SCNC: 132 MMOL/L (ref 135–145)
SOURCE SOURCE: NORMAL
SOURCE SOURCE: NORMAL
UFH PPP CHRO-ACNC: 0.16 IU/ML
UFH PPP CHRO-ACNC: 0.17 IU/ML
WBC # BLD AUTO: 12.7 K/UL (ref 4.8–10.8)

## 2021-01-27 PROCEDURE — 99232 SBSQ HOSP IP/OBS MODERATE 35: CPT | Performed by: INTERNAL MEDICINE

## 2021-01-27 PROCEDURE — 36415 COLL VENOUS BLD VENIPUNCTURE: CPT

## 2021-01-27 PROCEDURE — A9270 NON-COVERED ITEM OR SERVICE: HCPCS | Performed by: STUDENT IN AN ORGANIZED HEALTH CARE EDUCATION/TRAINING PROGRAM

## 2021-01-27 PROCEDURE — 501837 HCHG SUTURE CV: Performed by: SURGERY

## 2021-01-27 PROCEDURE — 160035 HCHG PACU - 1ST 60 MINS PHASE I: Performed by: SURGERY

## 2021-01-27 PROCEDURE — 03CH0ZZ EXTIRPATION OF MATTER FROM RIGHT COMMON CAROTID ARTERY, OPEN APPROACH: ICD-10-PCS | Performed by: SURGERY

## 2021-01-27 PROCEDURE — 84145 PROCALCITONIN (PCT): CPT

## 2021-01-27 PROCEDURE — 700111 HCHG RX REV CODE 636 W/ 250 OVERRIDE (IP): Performed by: ANESTHESIOLOGY

## 2021-01-27 PROCEDURE — 160036 HCHG PACU - EA ADDL 30 MINS PHASE I: Performed by: SURGERY

## 2021-01-27 PROCEDURE — A9270 NON-COVERED ITEM OR SERVICE: HCPCS | Performed by: ANESTHESIOLOGY

## 2021-01-27 PROCEDURE — 700111 HCHG RX REV CODE 636 W/ 250 OVERRIDE (IP): Performed by: SURGERY

## 2021-01-27 PROCEDURE — 85025 COMPLETE CBC W/AUTO DIFF WBC: CPT

## 2021-01-27 PROCEDURE — 700102 HCHG RX REV CODE 250 W/ 637 OVERRIDE(OP): Performed by: STUDENT IN AN ORGANIZED HEALTH CARE EDUCATION/TRAINING PROGRAM

## 2021-01-27 PROCEDURE — 86140 C-REACTIVE PROTEIN: CPT

## 2021-01-27 PROCEDURE — 700102 HCHG RX REV CODE 250 W/ 637 OVERRIDE(OP): Performed by: INTERNAL MEDICINE

## 2021-01-27 PROCEDURE — 160048 HCHG OR STATISTICAL LEVEL 1-5: Performed by: SURGERY

## 2021-01-27 PROCEDURE — 700101 HCHG RX REV CODE 250: Performed by: SURGERY

## 2021-01-27 PROCEDURE — 83735 ASSAY OF MAGNESIUM: CPT

## 2021-01-27 PROCEDURE — 160028 HCHG SURGERY MINUTES - 1ST 30 MINS LEVEL 3: Performed by: SURGERY

## 2021-01-27 PROCEDURE — 501838 HCHG SUTURE GENERAL: Performed by: SURGERY

## 2021-01-27 PROCEDURE — 84100 ASSAY OF PHOSPHORUS: CPT

## 2021-01-27 PROCEDURE — 160039 HCHG SURGERY MINUTES - EA ADDL 1 MIN LEVEL 3: Performed by: SURGERY

## 2021-01-27 PROCEDURE — 700105 HCHG RX REV CODE 258: Performed by: SURGERY

## 2021-01-27 PROCEDURE — B3131ZZ FLUOROSCOPY OF RIGHT COMMON CAROTID ARTERY USING LOW OSMOLAR CONTRAST: ICD-10-PCS | Performed by: SURGERY

## 2021-01-27 PROCEDURE — 85610 PROTHROMBIN TIME: CPT

## 2021-01-27 PROCEDURE — 700102 HCHG RX REV CODE 250 W/ 637 OVERRIDE(OP): Performed by: ANESTHESIOLOGY

## 2021-01-27 PROCEDURE — 85379 FIBRIN DEGRADATION QUANT: CPT

## 2021-01-27 PROCEDURE — C1757 CATH, THROMBECTOMY/EMBOLECT: HCPCS | Performed by: SURGERY

## 2021-01-27 PROCEDURE — 160009 HCHG ANES TIME/MIN: Performed by: SURGERY

## 2021-01-27 PROCEDURE — 700111 HCHG RX REV CODE 636 W/ 250 OVERRIDE (IP): Performed by: INTERNAL MEDICINE

## 2021-01-27 PROCEDURE — 500257: Performed by: SURGERY

## 2021-01-27 PROCEDURE — A9270 NON-COVERED ITEM OR SERVICE: HCPCS

## 2021-01-27 PROCEDURE — A9270 NON-COVERED ITEM OR SERVICE: HCPCS | Performed by: INTERNAL MEDICINE

## 2021-01-27 PROCEDURE — 700111 HCHG RX REV CODE 636 W/ 250 OVERRIDE (IP): Performed by: STUDENT IN AN ORGANIZED HEALTH CARE EDUCATION/TRAINING PROGRAM

## 2021-01-27 PROCEDURE — 700117 HCHG RX CONTRAST REV CODE 255: Performed by: SURGERY

## 2021-01-27 PROCEDURE — 80053 COMPREHEN METABOLIC PANEL: CPT

## 2021-01-27 PROCEDURE — 500002 HCHG ADHESIVE, DERMABOND: Performed by: SURGERY

## 2021-01-27 PROCEDURE — 82962 GLUCOSE BLOOD TEST: CPT | Mod: 91

## 2021-01-27 PROCEDURE — 770020 HCHG ROOM/CARE - TELE (206)

## 2021-01-27 PROCEDURE — 85520 HEPARIN ASSAY: CPT

## 2021-01-27 PROCEDURE — 85730 THROMBOPLASTIN TIME PARTIAL: CPT | Mod: 91

## 2021-01-27 PROCEDURE — 160002 HCHG RECOVERY MINUTES (STAT): Performed by: SURGERY

## 2021-01-27 PROCEDURE — 700101 HCHG RX REV CODE 250: Performed by: ANESTHESIOLOGY

## 2021-01-27 PROCEDURE — 700102 HCHG RX REV CODE 250 W/ 637 OVERRIDE(OP)

## 2021-01-27 RX ORDER — SCOLOPAMINE TRANSDERMAL SYSTEM 1 MG/1
1 PATCH, EXTENDED RELEASE TRANSDERMAL
Status: DISCONTINUED | OUTPATIENT
Start: 2021-01-27 | End: 2021-02-01 | Stop reason: HOSPADM

## 2021-01-27 RX ORDER — HEPARIN SODIUM 5000 [USP'U]/100ML
0-30 INJECTION, SOLUTION INTRAVENOUS CONTINUOUS
Status: DISCONTINUED | OUTPATIENT
Start: 2021-01-27 | End: 2021-01-27

## 2021-01-27 RX ORDER — HYDRALAZINE HYDROCHLORIDE 20 MG/ML
INJECTION INTRAMUSCULAR; INTRAVENOUS PRN
Status: DISCONTINUED | OUTPATIENT
Start: 2021-01-27 | End: 2021-01-27 | Stop reason: SURG

## 2021-01-27 RX ORDER — ONDANSETRON 2 MG/ML
INJECTION INTRAMUSCULAR; INTRAVENOUS PRN
Status: DISCONTINUED | OUTPATIENT
Start: 2021-01-27 | End: 2021-01-27 | Stop reason: SURG

## 2021-01-27 RX ORDER — CEFAZOLIN SODIUM 1 G/3ML
INJECTION, POWDER, FOR SOLUTION INTRAMUSCULAR; INTRAVENOUS PRN
Status: DISCONTINUED | OUTPATIENT
Start: 2021-01-27 | End: 2021-01-27 | Stop reason: SURG

## 2021-01-27 RX ORDER — HEPARIN SODIUM,PORCINE 1000/ML
VIAL (ML) INJECTION PRN
Status: DISCONTINUED | OUTPATIENT
Start: 2021-01-27 | End: 2021-01-27 | Stop reason: SURG

## 2021-01-27 RX ORDER — HEPARIN SODIUM 5000 [USP'U]/100ML
0-30 INJECTION, SOLUTION INTRAVENOUS CONTINUOUS
Status: DISCONTINUED | OUTPATIENT
Start: 2021-01-27 | End: 2021-01-28

## 2021-01-27 RX ORDER — DIPHENHYDRAMINE HYDROCHLORIDE 50 MG/ML
12.5 INJECTION INTRAMUSCULAR; INTRAVENOUS
Status: DISCONTINUED | OUTPATIENT
Start: 2021-01-27 | End: 2021-01-27 | Stop reason: HOSPADM

## 2021-01-27 RX ORDER — ALBUTEROL SULFATE 90 UG/1
AEROSOL, METERED RESPIRATORY (INHALATION) PRN
Status: DISCONTINUED | OUTPATIENT
Start: 2021-01-27 | End: 2021-01-27 | Stop reason: SURG

## 2021-01-27 RX ORDER — HYDROMORPHONE HYDROCHLORIDE 1 MG/ML
0.4 INJECTION, SOLUTION INTRAMUSCULAR; INTRAVENOUS; SUBCUTANEOUS
Status: DISCONTINUED | OUTPATIENT
Start: 2021-01-27 | End: 2021-01-27 | Stop reason: HOSPADM

## 2021-01-27 RX ORDER — BUPIVACAINE HYDROCHLORIDE AND EPINEPHRINE 5; 5 MG/ML; UG/ML
INJECTION, SOLUTION EPIDURAL; INTRACAUDAL; PERINEURAL
Status: DISCONTINUED | OUTPATIENT
Start: 2021-01-27 | End: 2021-01-27 | Stop reason: HOSPADM

## 2021-01-27 RX ORDER — HYDROMORPHONE HYDROCHLORIDE 1 MG/ML
0.1 INJECTION, SOLUTION INTRAMUSCULAR; INTRAVENOUS; SUBCUTANEOUS
Status: DISCONTINUED | OUTPATIENT
Start: 2021-01-27 | End: 2021-01-27 | Stop reason: HOSPADM

## 2021-01-27 RX ORDER — LIDOCAINE HYDROCHLORIDE 20 MG/ML
INJECTION, SOLUTION EPIDURAL; INFILTRATION; INTRACAUDAL; PERINEURAL PRN
Status: DISCONTINUED | OUTPATIENT
Start: 2021-01-27 | End: 2021-01-27 | Stop reason: SURG

## 2021-01-27 RX ORDER — IODIXANOL 270 MG/ML
INJECTION, SOLUTION INTRAVASCULAR
Status: DISCONTINUED | OUTPATIENT
Start: 2021-01-27 | End: 2021-01-27 | Stop reason: HOSPADM

## 2021-01-27 RX ORDER — HYDRALAZINE HYDROCHLORIDE 20 MG/ML
10 INJECTION INTRAMUSCULAR; INTRAVENOUS EVERY 4 HOURS PRN
Status: DISCONTINUED | OUTPATIENT
Start: 2021-01-27 | End: 2021-01-28

## 2021-01-27 RX ORDER — HYDROMORPHONE HYDROCHLORIDE 1 MG/ML
0.2 INJECTION, SOLUTION INTRAMUSCULAR; INTRAVENOUS; SUBCUTANEOUS
Status: DISCONTINUED | OUTPATIENT
Start: 2021-01-27 | End: 2021-01-27 | Stop reason: HOSPADM

## 2021-01-27 RX ORDER — HYDROMORPHONE HYDROCHLORIDE 1 MG/ML
INJECTION, SOLUTION INTRAMUSCULAR; INTRAVENOUS; SUBCUTANEOUS
Status: DISPENSED
Start: 2021-01-27 | End: 2021-01-28

## 2021-01-27 RX ORDER — NITROGLYCERIN 20 MG/100ML
INJECTION INTRAVENOUS PRN
Status: DISCONTINUED | OUTPATIENT
Start: 2021-01-27 | End: 2021-01-27 | Stop reason: SURG

## 2021-01-27 RX ORDER — ONDANSETRON 2 MG/ML
4 INJECTION INTRAMUSCULAR; INTRAVENOUS
Status: COMPLETED | OUTPATIENT
Start: 2021-01-27 | End: 2021-01-27

## 2021-01-27 RX ORDER — ONDANSETRON 2 MG/ML
4 INJECTION INTRAMUSCULAR; INTRAVENOUS EVERY 4 HOURS PRN
Status: DISCONTINUED | OUTPATIENT
Start: 2021-01-27 | End: 2021-02-01 | Stop reason: HOSPADM

## 2021-01-27 RX ORDER — LABETALOL HYDROCHLORIDE 5 MG/ML
10 INJECTION, SOLUTION INTRAVENOUS EVERY 4 HOURS PRN
Status: DISCONTINUED | OUTPATIENT
Start: 2021-01-27 | End: 2021-01-28

## 2021-01-27 RX ADMIN — SUGAMMADEX 200 MG: 100 INJECTION, SOLUTION INTRAVENOUS at 17:09

## 2021-01-27 RX ADMIN — PROPOFOL 100 MG: 10 INJECTION, EMULSION INTRAVENOUS at 15:53

## 2021-01-27 RX ADMIN — HYDRALAZINE HYDROCHLORIDE 10 MG: 20 INJECTION INTRAMUSCULAR; INTRAVENOUS at 16:52

## 2021-01-27 RX ADMIN — ENOXAPARIN SODIUM 40 MG: 40 INJECTION SUBCUTANEOUS at 05:39

## 2021-01-27 RX ADMIN — THERA TABS 1 TABLET: TAB at 05:40

## 2021-01-27 RX ADMIN — NITROGLYCERIN 50 MCG: 20 INJECTION INTRAVENOUS at 16:59

## 2021-01-27 RX ADMIN — ALBUTEROL SULFATE 4 PUFF: 90 AEROSOL, METERED RESPIRATORY (INHALATION) at 16:47

## 2021-01-27 RX ADMIN — ROCURONIUM BROMIDE 50 MG: 10 INJECTION, SOLUTION INTRAVENOUS at 15:35

## 2021-01-27 RX ADMIN — CEFAZOLIN 2 G: 330 INJECTION, POWDER, FOR SOLUTION INTRAMUSCULAR; INTRAVENOUS at 15:31

## 2021-01-27 RX ADMIN — TAMSULOSIN HYDROCHLORIDE 0.4 MG: 0.4 CAPSULE ORAL at 07:32

## 2021-01-27 RX ADMIN — LIDOCAINE HYDROCHLORIDE 100 MG: 20 INJECTION, SOLUTION EPIDURAL; INFILTRATION; INTRACAUDAL; PERINEURAL at 15:35

## 2021-01-27 RX ADMIN — ONDANSETRON 4 MG: 2 INJECTION INTRAMUSCULAR; INTRAVENOUS at 17:44

## 2021-01-27 RX ADMIN — HYDRALAZINE HYDROCHLORIDE 10 MG: 20 INJECTION INTRAMUSCULAR; INTRAVENOUS at 16:46

## 2021-01-27 RX ADMIN — NITROGLYCERIN 50 MCG: 20 INJECTION INTRAVENOUS at 16:54

## 2021-01-27 RX ADMIN — HEPARIN SODIUM 18 UNITS/KG/HR: 5000 INJECTION, SOLUTION INTRAVENOUS at 17:37

## 2021-01-27 RX ADMIN — SUGAMMADEX 200 MG: 100 INJECTION, SOLUTION INTRAVENOUS at 16:41

## 2021-01-27 RX ADMIN — HEPARIN SODIUM 4000 UNITS: 1000 INJECTION, SOLUTION INTRAVENOUS; SUBCUTANEOUS at 16:05

## 2021-01-27 RX ADMIN — HEPARIN SODIUM 12 UNITS/KG/HR: 5000 INJECTION, SOLUTION INTRAVENOUS at 12:19

## 2021-01-27 RX ADMIN — ROCURONIUM BROMIDE 25 MG: 10 INJECTION, SOLUTION INTRAVENOUS at 16:17

## 2021-01-27 RX ADMIN — LIDOCAINE HYDROCHLORIDE 100 MG: 20 INJECTION, SOLUTION EPIDURAL; INFILTRATION; INTRACAUDAL; PERINEURAL at 16:45

## 2021-01-27 RX ADMIN — DEXAMETHASONE SODIUM PHOSPHATE 6 MG: 4 INJECTION, SOLUTION INTRA-ARTICULAR; INTRALESIONAL; INTRAMUSCULAR; INTRAVENOUS; SOFT TISSUE at 07:32

## 2021-01-27 RX ADMIN — NITROGLYCERIN 50 MCG: 20 INJECTION INTRAVENOUS at 16:55

## 2021-01-27 RX ADMIN — ALBUTEROL SULFATE 4 PUFF: 90 AEROSOL, METERED RESPIRATORY (INHALATION) at 16:51

## 2021-01-27 RX ADMIN — ATORVASTATIN CALCIUM 80 MG: 80 TABLET, FILM COATED ORAL at 21:16

## 2021-01-27 RX ADMIN — ONDANSETRON 4 MG: 2 INJECTION INTRAMUSCULAR; INTRAVENOUS at 16:33

## 2021-01-27 RX ADMIN — PROPOFOL 200 MG: 10 INJECTION, EMULSION INTRAVENOUS at 15:35

## 2021-01-27 ASSESSMENT — PAIN DESCRIPTION - PAIN TYPE
TYPE: SURGICAL PAIN

## 2021-01-27 ASSESSMENT — PATIENT HEALTH QUESTIONNAIRE - PHQ9
SUM OF ALL RESPONSES TO PHQ9 QUESTIONS 1 AND 2: 0
1. LITTLE INTEREST OR PLEASURE IN DOING THINGS: NOT AT ALL
2. FEELING DOWN, DEPRESSED, IRRITABLE, OR HOPELESS: NOT AT ALL

## 2021-01-27 ASSESSMENT — FIBROSIS 4 INDEX: FIB4 SCORE: 0.26

## 2021-01-27 NOTE — PROGRESS NOTES
I received a page from the nurse stating that the patient suddenly developed left arm numbness and weakness.  NIH score of 2.  I have ordered a stat CT head and CTA head and neck for evaluation of stroke.  Every 4 hour neurochecks.    Addendum: CTA head and neck reveals right common carotid artery thrombus with 65% stenosis.  No other acute findings.  I discussed the case with neurology .  Not a candidate for TPA given low NIH score.  As started the patient on aspirin and high intensity statin.  Permissive hypertension.  Every 4 hours neuro checks.  Ordered MRI brain.  We will consider consulting vascular surgery in the morning regarding his right common carotid artery thrombus.

## 2021-01-27 NOTE — PROGRESS NOTES
used to complete MRI Screening. Neuro check complete no change. Medications passed and questions answered regarding MRI. Will continue to closely monitor.

## 2021-01-27 NOTE — PROGRESS NOTES
Pt returned from CT. Weakness and drift noted in LUE. No other neurological symptoms noted. Dr Townsend notified and he confirmed Neurology will be involved. Q4 neuro checks continuing, Pt directed to call if new symptoms develop or worsen.

## 2021-01-27 NOTE — ANESTHESIA PREPROCEDURE EVALUATION
Relevant Problems   CARDIAC   (+) HTN (hypertension)   (+) Thrombosis of right common carotid artery         (+) Nephrolithiasis   (+) Renal insufficiency   carotid thrombus  COVID current infection  Recent respiratory failure    Physical Exam    Airway   Mallampati: II  TM distance: >3 FB  Neck ROM: full       Cardiovascular - normal exam  Rhythm: regular  Rate: normal  (-) murmur     Dental - normal exam           Pulmonary - normal exam  Breath sounds clear to auscultation     Abdominal    Neurological - normal exam                 Anesthesia Plan    ASA 4- EMERGENT   ASA physical status 4 criteria: respiratory failureASA physical status emergent criteria: neurologic compromise requiring immediate intervention    Plan - general             Induction: intravenous      Pertinent diagnostic labs and testing reviewed    Informed Consent:    Anesthetic plan and risks discussed with patient.

## 2021-01-27 NOTE — ANESTHESIA PROCEDURE NOTES
Arterial Line  Performed by: Ritchie Crawford M.D.  Authorized by: Ritchie Crawford M.D.

## 2021-01-27 NOTE — THERAPY
Missed Therapy     Patient Name: Herrera Barros  Age:  45 y.o., Sex:  male  Medical Record #: [id number]  Today's Date: 1/27/2021 01/27/21 1427   Treatment Variance   Reason For Missed Therapy Medical - Patient  in Procedure   Interdisciplinary Plan of Care Collaboration   IDT Collaboration with  Nursing   Collaboration Comments Orders received for a clinical swallow and cognitive linguistic evaluation. Per RN, patient is currently NPO for procedure but has been consuming PO without difficulty and no concerns for cognitive deficits. RN to clarify order with MD. SLP following as appropriate.

## 2021-01-27 NOTE — DISCHARGE PLANNING
@1530  Agency[facility]Facility Name: [facility]  Spoke To: Meghna  Outcome: Accepted and being delivered.    @1400  Agency[facility]Facility Name: [facility]  Spoke To: Meghna  Outcome: Checking insurance.    Received Choice form at 1140  Agency[facility]Facility Name: [facility]  Referral sent per Choice form @ 1143

## 2021-01-27 NOTE — PROGRESS NOTES
Dr. Bella, vascular surgeon, rounded on patient. Stated he will be taking patient for an carotid endarterectomy. MD notified that heparin drip was started recently, MD stated it was okay to continue at this time. MD stated patient needs to be NPO and will be taken this afternoon.

## 2021-01-27 NOTE — PROGRESS NOTES
Hospital Medicine Daily Progress Note    Date of Service  1/27/2021    Chief Complaint  45 y.o. male admitted 1/22/2021 with SOB, cough    Hospital Course  A 45-year-old man with h/o HTN, BPH, snoring, obesity, recently diagnosed COVID 19 infection (1/17/21) presented with worsening SOB, cough for one week.  Saturation was 74% on room air, improved to 97% on 6 L NC oxygen. Leukocytosis  13.1. CXR showed diffuse interstitial infiltrates concerning for COVID 19 infection.  Blood culture negative so far. CTA neck low-density filling defect in the distal right common carotid artery, appearance most compatible with thrombus adherent to the wall, results in approximately 65% stenosis.    Interval Problem Update  Patient was seen and examined at bedside.  No acute events overnight. Patient is resting comfortably in bed and in no acute distress. Patient was interviewed with assistance of  services.     Decadron day 5/10  Lasix 20mg daily  5L nasal cannula  Heparin infusion   Vascular surgery consulted for Carotid thrombus    Consultants/Specialty  Vascular    Code Status  Full Code    Disposition  TBD    Review of Systems  Review of Systems   Constitutional: Positive for malaise/fatigue. Negative for chills and fever.   HENT: Negative.    Eyes: Negative.    Respiratory: Positive for cough and shortness of breath.    Cardiovascular: Negative for chest pain.   Gastrointestinal: Negative for abdominal pain, nausea and vomiting.   Genitourinary: Negative for dysuria.   Musculoskeletal: Negative for myalgias.   Skin: Negative for rash.   Neurological: Positive for weakness. Negative for headaches.     Physical Exam  Temp:  [36.1 °C (97 °F)-37 °C (98.6 °F)] 36.1 °C (97 °F)  Pulse:  [76-89] 83  Resp:  [18-21] 21  BP: (117-140)/(69-95) 139/90  SpO2:  [90 %-94 %] 94 %    Physical Exam  Vitals signs and nursing note reviewed.   Constitutional:       Appearance: He is obese. He is ill-appearing.   HENT:      Head:  Normocephalic.      Mouth/Throat: no erythema, no confestion     Mouth: Mucous membranes are moist.      Pharynx: Oropharynx is clear.   Eyes:      Pupils: Pupils are equal, round, and reactive to light.   Neck:      Musculoskeletal: Normal range of motion.   Cardiovascular:      Rate and Rhythm: Normal rate and regular rhythm.      Heart sounds: Normal heart sounds.   Pulmonary:      Effort: Pulmonary effort is normal.      Breath sounds: faint crackles auscultated in lower lung bases bilaterally  Abdominal:      General: Abdomen is flat. Bowel sounds are normal.      Tenderness: There is no abdominal tenderness.   Musculoskeletal: Normal range of motion.   Skin:     General: Skin is warm.   Neurological:      General: No focal deficit present.      Mental Status: He is alert and oriented to person, place, and time.     Fluids    Intake/Output Summary (Last 24 hours) at 1/27/2021 1329  Last data filed at 1/27/2021 0900  Gross per 24 hour   Intake 240 ml   Output 1200 ml   Net -960 ml       Laboratory  Recent Labs     01/25/21  0501 01/26/21  0446 01/27/21  0802   WBC 14.7* 15.5* 12.7*   RBC 4.65* 4.81 5.01   HEMOGLOBIN 13.1* 13.3* 13.8*   HEMATOCRIT 40.8* 41.3* 42.8   MCV 87.7 85.9 85.4   MCH 28.2 27.7 27.5   MCHC 32.1* 32.2* 32.2*   RDW 47.6 45.3 43.8   PLATELETCT 538* 588* 733*   MPV 10.2 10.0 9.9     Recent Labs     01/25/21  0501 01/26/21  0446 01/27/21  0802   SODIUM 138 134* 132*   POTASSIUM 3.8 3.7 4.1   CHLORIDE 101 99 98   CO2 25 23 17*   GLUCOSE 88 97 85   BUN 19 15 17   CREATININE 0.61 0.63 0.69   CALCIUM 8.4* 8.3* 9.0     Recent Labs     01/27/21  0802   APTT 28.4   INR 1.07               Imaging  CT-CTA HEAD WITH & W/O-POST PROCESS   Final Result         1.  No large vessel occlusion or aneurysm appreciated.      CT-CTA NECK WITH & W/O-POST PROCESSING   Final Result         1.  Low-density filling defect in the distal right common carotid artery, appearance most compatible with thrombus adherent to the  wall, results in approximately 65% stenosis.   2.  Peripheral reticular opacities in the bilateral lung apices, appearance favors Covid infiltrates.      These findings were discussed with the patient's clinician, Jeremy Townsend, on 1/26/2021 10:20 PM.         DX-CHEST-PORTABLE (1 VIEW)   Final Result      Multifocal bilateral pneumonia.      MR-BRAIN-W/O    (Results Pending)   EC-ECHOCARDIOGRAM COMPLETE W/O CONT    (Results Pending)        Assessment/Plan  * Acute hypoxemic respiratory failure due to COVID-19 (HCC)- (present on admission)  Assessment & Plan  COVID positive 1/17/21  Contact, droplet, eye precaution  Oxygen per protocol  Decadron day 4/10  Lasix 20mg daily  Trend inflammatory markers  5L nasal cannula    Thrombosis of right common carotid artery  Assessment & Plan  As identified on CTA neck: Low-density filling defect in the distal right common carotid artery, appearance most compatible with thrombus adherent to the wall, results in approximately 65% stenosis.    - Heparin infusion  -  Vascular surgery for thrombectomy    Elevated glucose- (present on admission)  Assessment & Plan  Hemoglobin A1c 7.5  Newly diagnosed DM  Diet consistent carbohydrates  POCT glucose  Sliding scale insulin  Diabetic education    HTN (hypertension)- (present on admission)  Assessment & Plan  Controlled  Continue home losartan.    Numbness and tingling of right leg- (present on admission)  Assessment & Plan  Possibly due neuropathy  Consider gabapentin if persistent  D-dimer 1.21    Obesity (BMI 30.0-34.9)- (present on admission)  Assessment & Plan  BMI 34.   healthy lifestyle changes when stable     VTE prophylaxis: heparin gtt

## 2021-01-27 NOTE — CONSULTS
Seen and examined.  Please see dictated note, #[id number].    Plan:  To OR for surgical thrombectomy.  Heparin weight-based but no boluses in the meantime.  Echocardiogram.    Discussed with Dr. Winkler and patient.  All questions were answered.

## 2021-01-27 NOTE — PROGRESS NOTES
Report received from night shift RN. Assumed patient care. No signs of distress at this time. Tele box on and rhythm verified. Safety precautions in place. Call light and personal belongings within reach. Educated patient to use call light if assistance is needed. Will continue to monitor.

## 2021-01-27 NOTE — ASSESSMENT & PLAN NOTE
As identified on CTA neck: Low-density filling defect in the distal right common carotid artery, appearance most compatible with thrombus adherent to the wall, results in approximately 65% stenosis.    - Heparin infusion  - s/p thrombectomy  - bridge to coumadin per vascular  - 2d echo: no thrombus noted, EF 75%  - MRI brain demonstrates several infarcts in right anterior circulation consistent with right common carotid distribution  - bridge to coumadin INR 1.8

## 2021-01-28 ENCOUNTER — APPOINTMENT (OUTPATIENT)
Dept: RADIOLOGY | Facility: MEDICAL CENTER | Age: 46
DRG: 853 | End: 2021-01-28
Attending: INTERNAL MEDICINE
Payer: OTHER MISCELLANEOUS

## 2021-01-28 ENCOUNTER — APPOINTMENT (OUTPATIENT)
Dept: CARDIOLOGY | Facility: MEDICAL CENTER | Age: 46
DRG: 853 | End: 2021-01-28
Attending: INTERNAL MEDICINE
Payer: OTHER MISCELLANEOUS

## 2021-01-28 LAB
ALBUMIN SERPL BCP-MCNC: 3.2 G/DL (ref 3.2–4.9)
ANION GAP SERPL CALC-SCNC: 14 MMOL/L (ref 7–16)
BASOPHILS # BLD AUTO: 0.2 % (ref 0–1.8)
BASOPHILS # BLD: 0.03 K/UL (ref 0–0.12)
BUN SERPL-MCNC: 15 MG/DL (ref 8–22)
CALCIUM SERPL-MCNC: 8.6 MG/DL (ref 8.5–10.5)
CHLORIDE SERPL-SCNC: 103 MMOL/L (ref 96–112)
CO2 SERPL-SCNC: 24 MMOL/L (ref 20–33)
CREAT SERPL-MCNC: 0.73 MG/DL (ref 0.5–1.4)
EOSINOPHIL # BLD AUTO: 0.12 K/UL (ref 0–0.51)
EOSINOPHIL NFR BLD: 1 % (ref 0–6.9)
ERYTHROCYTE [DISTWIDTH] IN BLOOD BY AUTOMATED COUNT: 45.1 FL (ref 35.9–50)
GLUCOSE BLD-MCNC: 125 MG/DL (ref 65–99)
GLUCOSE BLD-MCNC: 218 MG/DL (ref 65–99)
GLUCOSE BLD-MCNC: 84 MG/DL (ref 65–99)
GLUCOSE BLD-MCNC: 96 MG/DL (ref 65–99)
GLUCOSE SERPL-MCNC: 111 MG/DL (ref 65–99)
HCT VFR BLD AUTO: 39.2 % (ref 42–52)
HGB BLD-MCNC: 12.7 G/DL (ref 14–18)
IMM GRANULOCYTES # BLD AUTO: 0.23 K/UL (ref 0–0.11)
IMM GRANULOCYTES NFR BLD AUTO: 1.8 % (ref 0–0.9)
LV EJECT FRACT  99904: 75
LV EJECT FRACT MOD 4C 99902: 52.05
LYMPHOCYTES # BLD AUTO: 1.1 K/UL (ref 1–4.8)
LYMPHOCYTES NFR BLD: 8.8 % (ref 22–41)
MAGNESIUM SERPL-MCNC: 2.1 MG/DL (ref 1.5–2.5)
MCH RBC QN AUTO: 28 PG (ref 27–33)
MCHC RBC AUTO-ENTMCNC: 32.4 G/DL (ref 33.7–35.3)
MCV RBC AUTO: 86.3 FL (ref 81.4–97.8)
MONOCYTES # BLD AUTO: 0.58 K/UL (ref 0–0.85)
MONOCYTES NFR BLD AUTO: 4.6 % (ref 0–13.4)
NEUTROPHILS # BLD AUTO: 10.5 K/UL (ref 1.82–7.42)
NEUTROPHILS NFR BLD: 83.6 % (ref 44–72)
NRBC # BLD AUTO: 0 K/UL
NRBC BLD-RTO: 0 /100 WBC
PHOSPHATE SERPL-MCNC: 4 MG/DL (ref 2.5–4.5)
PLATELET # BLD AUTO: 778 K/UL (ref 164–446)
PMV BLD AUTO: 9.6 FL (ref 9–12.9)
POTASSIUM SERPL-SCNC: 4.4 MMOL/L (ref 3.6–5.5)
RBC # BLD AUTO: 4.54 M/UL (ref 4.7–6.1)
SODIUM SERPL-SCNC: 141 MMOL/L (ref 135–145)
UFH PPP CHRO-ACNC: 0.21 IU/ML
UFH PPP CHRO-ACNC: <0.1 IU/ML
WBC # BLD AUTO: 12.6 K/UL (ref 4.8–10.8)

## 2021-01-28 PROCEDURE — 770020 HCHG ROOM/CARE - TELE (206)

## 2021-01-28 PROCEDURE — 93321 DOPPLER ECHO F-UP/LMTD STD: CPT | Mod: 26 | Performed by: INTERNAL MEDICINE

## 2021-01-28 PROCEDURE — 700102 HCHG RX REV CODE 250 W/ 637 OVERRIDE(OP): Performed by: INTERNAL MEDICINE

## 2021-01-28 PROCEDURE — 85520 HEPARIN ASSAY: CPT

## 2021-01-28 PROCEDURE — 83735 ASSAY OF MAGNESIUM: CPT

## 2021-01-28 PROCEDURE — A9270 NON-COVERED ITEM OR SERVICE: HCPCS | Performed by: STUDENT IN AN ORGANIZED HEALTH CARE EDUCATION/TRAINING PROGRAM

## 2021-01-28 PROCEDURE — A9270 NON-COVERED ITEM OR SERVICE: HCPCS | Performed by: INTERNAL MEDICINE

## 2021-01-28 PROCEDURE — 85025 COMPLETE CBC W/AUTO DIFF WBC: CPT

## 2021-01-28 PROCEDURE — 82962 GLUCOSE BLOOD TEST: CPT | Mod: 91

## 2021-01-28 PROCEDURE — 36415 COLL VENOUS BLD VENIPUNCTURE: CPT

## 2021-01-28 PROCEDURE — 700111 HCHG RX REV CODE 636 W/ 250 OVERRIDE (IP): Performed by: SURGERY

## 2021-01-28 PROCEDURE — 700102 HCHG RX REV CODE 250 W/ 637 OVERRIDE(OP): Performed by: STUDENT IN AN ORGANIZED HEALTH CARE EDUCATION/TRAINING PROGRAM

## 2021-01-28 PROCEDURE — 700111 HCHG RX REV CODE 636 W/ 250 OVERRIDE (IP): Performed by: STUDENT IN AN ORGANIZED HEALTH CARE EDUCATION/TRAINING PROGRAM

## 2021-01-28 PROCEDURE — 51798 US URINE CAPACITY MEASURE: CPT

## 2021-01-28 PROCEDURE — 93308 TTE F-UP OR LMTD: CPT | Mod: 26 | Performed by: INTERNAL MEDICINE

## 2021-01-28 PROCEDURE — 93325 DOPPLER ECHO COLOR FLOW MAPG: CPT

## 2021-01-28 PROCEDURE — 99232 SBSQ HOSP IP/OBS MODERATE 35: CPT | Performed by: INTERNAL MEDICINE

## 2021-01-28 PROCEDURE — 80069 RENAL FUNCTION PANEL: CPT

## 2021-01-28 RX ORDER — HYDROCODONE BITARTRATE AND ACETAMINOPHEN 5; 325 MG/1; MG/1
1-2 TABLET ORAL EVERY 6 HOURS PRN
Status: DISCONTINUED | OUTPATIENT
Start: 2021-01-28 | End: 2021-02-01 | Stop reason: HOSPADM

## 2021-01-28 RX ORDER — CEFAZOLIN SODIUM 2 G/100ML
2 INJECTION, SOLUTION INTRAVENOUS EVERY 8 HOURS
Status: COMPLETED | OUTPATIENT
Start: 2021-01-28 | End: 2021-01-30

## 2021-01-28 RX ORDER — LABETALOL HYDROCHLORIDE 5 MG/ML
10 INJECTION, SOLUTION INTRAVENOUS EVERY 4 HOURS PRN
Status: DISCONTINUED | OUTPATIENT
Start: 2021-01-28 | End: 2021-02-01 | Stop reason: HOSPADM

## 2021-01-28 RX ORDER — SODIUM CHLORIDE, SODIUM LACTATE, POTASSIUM CHLORIDE, CALCIUM CHLORIDE 600; 310; 30; 20 MG/100ML; MG/100ML; MG/100ML; MG/100ML
INJECTION, SOLUTION INTRAVENOUS EVERY 6 HOURS
Status: ACTIVE | OUTPATIENT
Start: 2021-01-28 | End: 2021-01-28

## 2021-01-28 RX ORDER — HYDRALAZINE HYDROCHLORIDE 20 MG/ML
10 INJECTION INTRAMUSCULAR; INTRAVENOUS EVERY 4 HOURS PRN
Status: DISCONTINUED | OUTPATIENT
Start: 2021-01-28 | End: 2021-02-01 | Stop reason: HOSPADM

## 2021-01-28 RX ORDER — ACETAMINOPHEN 500 MG
500 TABLET ORAL EVERY 6 HOURS PRN
Status: DISCONTINUED | OUTPATIENT
Start: 2021-01-28 | End: 2021-02-01 | Stop reason: HOSPADM

## 2021-01-28 RX ORDER — HEPARIN SODIUM 5000 [USP'U]/100ML
800 INJECTION, SOLUTION INTRAVENOUS CONTINUOUS
Status: DISCONTINUED | OUTPATIENT
Start: 2021-01-28 | End: 2021-01-29

## 2021-01-28 RX ORDER — MORPHINE SULFATE 4 MG/ML
1-2 INJECTION, SOLUTION INTRAMUSCULAR; INTRAVENOUS
Status: DISCONTINUED | OUTPATIENT
Start: 2021-01-28 | End: 2021-02-01 | Stop reason: HOSPADM

## 2021-01-28 RX ADMIN — THERA TABS 1 TABLET: TAB at 05:45

## 2021-01-28 RX ADMIN — CEFAZOLIN SODIUM 2 G: 2 INJECTION, SOLUTION INTRAVENOUS at 05:43

## 2021-01-28 RX ADMIN — TAMSULOSIN HYDROCHLORIDE 0.4 MG: 0.4 CAPSULE ORAL at 09:32

## 2021-01-28 RX ADMIN — HEPARIN SODIUM 800 UNITS/HR: 5000 INJECTION, SOLUTION INTRAVENOUS at 09:34

## 2021-01-28 RX ADMIN — INSULIN HUMAN 2 UNITS: 100 INJECTION, SOLUTION PARENTERAL at 12:57

## 2021-01-28 RX ADMIN — ATORVASTATIN CALCIUM 80 MG: 80 TABLET, FILM COATED ORAL at 17:53

## 2021-01-28 RX ADMIN — CEFAZOLIN SODIUM 2 G: 2 INJECTION, SOLUTION INTRAVENOUS at 21:21

## 2021-01-28 RX ADMIN — CEFAZOLIN SODIUM 2 G: 2 INJECTION, SOLUTION INTRAVENOUS at 14:25

## 2021-01-28 RX ADMIN — DEXAMETHASONE SODIUM PHOSPHATE 6 MG: 4 INJECTION, SOLUTION INTRA-ARTICULAR; INTRALESIONAL; INTRAMUSCULAR; INTRAVENOUS; SOFT TISSUE at 05:36

## 2021-01-28 NOTE — CONSULTS
DATE OF SERVICE:  01/27/2021     REFERRING PHYSICIAN:  Riley Winkler DO     REASON FOR CONSULTATION:  Right carotid thrombus.     HISTORY OF PRESENT ILLNESS:  This is a 45-year-old male who was admitted on   01/22/2021 for cough and shortness of breath due to COVID.  Per patient,   yesterday around 06:00 p.m., he developed transient left upper extremity   weakness, which lasted for about an hour or so.  A CTA of the head and neck   was done at 09:41 last night and showed evidence of thrombus in the right common   carotid artery.  For some reasons, nothing was done.  I was consulted today by   Dr. Winkler at 11 a.m. for this problem.     PAST MEDICAL HISTORY: Significant for COVID infection as mentioned in the   history of present illness.  He also has a history of arrhythmia,   hypertension, and renal disorder.     PAST SURGICAL HISTORY:  Significant for orthopedic surgery and urology   surgery.     ALLERGIES:  NKDA.     CURRENT MEDICATIONS:  Reviewed.     SOCIAL HISTORY:  Negative for tobacco use, alcohol abuse, or illicit drug use.     FAMILY HISTORY:  Significant for hypertension.     REVIEW OF SYSTEMS:  Significant for shortness of breath.  The patient denies   chest pain.  He currently denies any neurological symptoms.  He also denies   headache.     PHYSICAL EXAMINATION:  VITAL SIGNS:  Afebrile, heart rate 83, respiratory rate 21, blood pressure   139/90.  GENERAL:  Pleasant male who is on oxygen.  LUNGS:  Decreased breath sounds at bases.  CARDIOVASCULAR:  Regular rate and rhythm.  ABDOMEN:  Soft, nondistended, nontender.  EXTREMITIES:  Trace edema.  Both feet are warm, well perfused.  NEUROLOGIC:  Grossly nonfocal.     DIAGNOSTIC DATA:  Radiology studies were reviewed.     LABORATORY DATA:  Also reviewed.     ASSESSMENT:  1.  Transient ischemic attack with right common carotid artery thrombus.  2.  COVID pneumonia.  3.  Hypertension.  4.  History of arrhythmia.     PLAN:  I had a long discussion with the  patient through an interpreting   service.  Study results were reviewed with him.  I discussed with him the   option of undergoing a right carotid thrombectomy.  Risks and benefits were   discussed.  Risks include, but not limited to, bleeding, infection,   hyperperfusion syndrome, potential hemorrhagic conversion of stroke, death, stroke,  worsening of respiratory problem, and perioperative anesthetic complication.  The alternative of not having the surgery done was also discussed.  With this option, there is risk of strokes.  After a long   discussion, the patient indicated understanding and would like to proceed with   surgery.  All questions were answered.     Case was discussed with Dr. Winkler.  I recommended that the patient be   placed on heparin weight-based protocol, but no boluses while waiting for his   surgery.  OR was contacted.     I also recommended an MRI of the brain and echocardiogram be obtained as well.     Thank you for the consultation.        ______________________________  MD VALENTÍN Buckner/MARISSA/MILTON    DD:  01/27/2021 14:44  DT:  01/27/2021 16:57    Job#:  [id number]    CC:Riley Winkler DO

## 2021-01-28 NOTE — PROGRESS NOTES
Awake, no complaints.  No headache.  AF, VSS    Gen - Pleasant male in NAD.  HEENT - Incision c/d/i with mild surrounding puffiness.  Trachea mid line.  Neurol - Non focal.    Assessment: S/P removal of R carotid thrombus and angiogram.    Plan:  Doing well from vascular standpoint.  Change Heparin gtt at 800 units/hr for now to prevent hematoma.  Will change to weight-based heparin GTT tomorrow. Start Warfarin tomorrow.  Need echocardiogram to rule out cardiac as source of embolization.    Discussed with patient, RN, and pharmacy.

## 2021-01-28 NOTE — PROGRESS NOTES
"Patient back to floor with PACU RN, heparin gtt in use verified with PACU RN, patient aa&ox4, no complaints of pain at this time, patient states \"my neck is just uncomfortable\" explained to patient that he will need to remain on bed rest until the AM, patient is understanding, educated patient to call RN if he has an increase in pain, post op vital initiated, patient placed back on cardiac monitor techs notified: rhythm visualized. use of Ipad  for Yoruba during this encounter.       "

## 2021-01-28 NOTE — PROGRESS NOTES
Per Dr. Joelle Bella heparin drip to be at 800 units/hr for approx. 24 hours, no lab monitoring for heparin drip required at this time and will re-assess tomorrow morning for resumption of heparin drip protocol    Adrienne Morocho, BrodyD, BCPS

## 2021-01-28 NOTE — PROGRESS NOTES
"Placed page to Surgeon MD Joelle Bella at 0545, returned call from MD at 0606, call regarding patients increase in swelling at surgical site, patient stated that there is no pain at the site, he just feels \"uncomfortable\" and states \"that it is hard to turn my head\" patient denies any pressure on airway, O2 saturation in 90s, output from SOL drain ~30mls. Per MD Bella, hold heparin infusion for two hours then resume heparin infusion at 800 units/hr, at same concentration of heparin bag. This RN stopped infusion at 0615, infusion to resume at 0815 at 800 units/hr at same heparin bag of 2500units/500ml. Per MD do not adjust rate this is to be at a continuous rate only, and not off weight based protocol.    MD then told this RN to see if there was any clots that were in the line of the drain, this RN then went in to inspect the line, this RN did not visualize any clots in the line and drainage was flowing.    Per MD that if the day RN had any questions to get in contact with pharmacy and have them contact the MD regarding the medication rate/order. This RN then spoke to Bill Kamara regarding this medication and this patient. He then informed me to inform the Day RN regarding this situation, the RN will tell day RN conversation and direction from MD Bella.     This RN also informed MD regarding heparin protocol that it is off Xa factor and not aPTT protocol as in the past, MD understood that the protocol has changed as well as the monitoring.              "

## 2021-01-28 NOTE — OR SURGEON
Immediate Post OP Note    PreOp Diagnosis: Right common carotid thrombus and TIA.    PostOp Diagnosis: Same.    Procedure(s):  THROMBECTOMY RIGHT CAROTID ARTERY - Wound Class: Clean with Drain  ANGIOGRAM RIGHT CAROTID - Wound Class: Clean with Drain    Surgeon(s):  Joelle Bella M.D.    Anesthesiologist/Type of Anesthesia:  Anesthesiologist: Ritchie Crawford M.D./General    Surgical Staff:  Circulator: Saray Humphries R.N.  Scrub Person: Abbey Pereira; Aracelis Urias  Count Amanda: Aracelis Gonzalez R.N.  Radiology Technologist: Yaneth Hong  Runner: Georgina Matos    Specimens removed if any:  Organized clots removed.  * No specimens in log *    Estimated Blood Loss: 50ml.    IVF: 500ml.    Contrast: 10ml Visipaque.    Findings: Organized clots (cardiac source?).  Angiogram post thrombectomy showed patent R CCA and ICA.    Complications: None.    Dictated, #[id number].    Plan:  1) Continue Heparin weight-based protocol with NO BOLUS AT ANY TIME.  2) Obtain echocardiogram.        1/27/2021 5:13 PM Joelle Bella M.D.

## 2021-01-28 NOTE — PROGRESS NOTES
Went to retrieve monitor box from Pre-op. Patient was already in the surgical suite. KATHERYN England assured me that the patient would return to Tele with the monitor box.

## 2021-01-28 NOTE — ANESTHESIA POSTPROCEDURE EVALUATION
Patient: Herrera Barros    Procedure Summary     Date: 01/27/21 Room / Location: [facility] OR 09 / SURGERY [facility]    Anesthesia Start: 1526 Anesthesia Stop: 1722    Procedures:       THROMBECTOMY RIGHT CAROTID ARTERY (Right Neck)      ANGIOGRAM RIGHT CAROTID (Right Neck) Diagnosis: (Thrombosis of right common carotid artery)    Surgeons: Joelle Bella M.D. Responsible Provider: Ritchie Crawford M.D.    Anesthesia Type: general ASA Status: 4 - Emergent          Final Anesthesia Type: general  Last vitals  BP   Blood Pressure: 142/84, Arterial BP: 141/74    Temp   36.2 °C (97.1 °F)    Pulse   Pulse: (!) 126   Resp   (!) 40    SpO2   94 %      Anesthesia Post Evaluation    Patient location during evaluation: PACU  Patient participation: complete - patient participated  Level of consciousness: awake and alert    Airway patency: patent  Anesthetic complications: no  Cardiovascular status: hemodynamically stable  Respiratory status: acceptable  Hydration status: euvolemic    PONV: none           Nurse Pain Score: 0 (NPRS)

## 2021-01-28 NOTE — PROGRESS NOTES
MD Bella at bedside for AM assessment. MD requesting heparin infusion at 800 units/hr per night shift RN. MD notified of heparin protocol and weight based adjustments for therapeutic levels. Pharmacy notified of heparin infusion order. Pharmacy to discuss with MD plan of heparin infusion and monitoring. Heparin infusion at this time stopped for proper verification prior to administration. Will continue to monitor and provide updates.

## 2021-01-28 NOTE — PROGRESS NOTES
Patient requested this RN to update his daughter Khadijah regarding surgery during hospital stay. RN got in contact with daughter all questions answered, informed khadijah if she had any questions throughout the night that this RN would be on shift till 0715.

## 2021-01-28 NOTE — PROGRESS NOTES
This RN discussed heparin drip with Pharmacy. Pharmacy discussed with MD Bella about heparin drip. Per pharmacy, Patient to have no lab monitoring for heparin drip at this time, to re-assess in AM and resume heparin drip protocol at that time. Will continue to monitor and provide updates.

## 2021-01-28 NOTE — ANESTHESIA PROCEDURE NOTES
Airway    Date/Time: 1/27/2021 3:35 PM  Performed by: Ritchie Crawford M.D.  Authorized by: Ritchie Crawford M.D.     Location:  OR  Urgency:  Elective  Indications for Airway Management:  Anesthesia      Spontaneous Ventilation: absent    Sedation Level:  Deep  Preoxygenated: Yes    Patient Position:  Sniffing  Final Airway Type:  Endotracheal airway  Final Endotracheal Airway:  ETT  Cuffed: Yes    Technique Used for Successful ETT Placement:  Direct laryngoscopy    Insertion Site:  Oral  Blade Type:  Hong  Laryngoscope Blade/Videolaryngoscope Blade Size:  2  ETT Size (mm):  8.0  Measured from:  Teeth  ETT to Teeth (cm):  24  Placement Verified by: auscultation and capnometry    Cormack-Lehane Classification:  Grade I - full view of glottis  Number of Attempts at Approach:  1

## 2021-01-28 NOTE — CARE PLAN
Problem: Communication  Goal: The ability to communicate needs accurately and effectively will improve  Outcome: PROGRESSING AS EXPECTED     Problem: Safety  Goal: Will remain free from falls  Outcome: PROGRESSING AS EXPECTED     Problem: Venous Thromboembolism (VTW)/Deep Vein Thrombosis (DVT) Prevention:  Goal: Patient will participate in Venous Thrombosis (VTE)/Deep Vein Thrombosis (DVT)Prevention Measures  Outcome: PROGRESSING AS EXPECTED     Problem: Knowledge Deficit  Goal: Knowledge of disease process/condition, treatment plan, diagnostic tests, and medications will improve  Outcome: PROGRESSING AS EXPECTED

## 2021-01-28 NOTE — OR NURSING
Patient AO x 4, RASS -1, and denies pain in PACU.  IV Zofran given for mild nausea.   (iPad) used for neuro exam.  Equal strength upper/lower bilaterally 5/5.  Denies numbness/tingling.  Passed bedside swallow eval.  Tolerating PO clears.  Surgical site CDI (sutures / dermabond).  SOL patent with minimal serosanguinous output (~10mL).  POC reviewed, PIVs verified, and safety protocols in place.  Heparin gtt restarted w/o bolus per Dr. Bella's orders.  Verified with pharmacist (see MAR).  Next Heparin Xa lab draw will be 2345.  Per Dr. Bella - acceptable for patient to be in PACU for 2 hours vs. 3 hours for standard carotid endarterectomy.  HOB = 30 degrees / bed rest.    1900 - Patient received albuterol in OR and came to PACU on 10L, spO2 97%, RR 40.  Patient's respiratory status improved throughout recovery.  Currently on 6L NC, RR 25-28, spO2 96%.  BPs initially hypertensive upon transfer from OR.  Given IV hydralazine and nitro by anesthesia.  Stable 130s/80s at this time. R. Radial arterial line dc'd.    1915 - Recovery complete.  Awaiting T7 RN for updates.

## 2021-01-28 NOTE — PROGRESS NOTES
Monitoring Summary:  Rhythm: NSR  Rate Range: 80-92bpm  Ectopy: PVC (r)  Measurements: 0.18/0.10/0.34

## 2021-01-28 NOTE — OP REPORT
DATE OF SERVICE:  01/27/2021     SURGEON:  Joelle Bella MD     ANESTHESIOLOGIST:  Fabian Crawford MD     TYPE OF ANESTHESIA:  General anesthesia.     PREOPERATIVE DIAGNOSIS:  Right carotid artery thrombus with transient ischemic   attack.     POSTOPERATIVE DIAGNOSIS:  Right carotid artery thrombus with transient   ischemic attack.     PROCEDURES:  1.  Right carotid thrombectomy.  2.  Needle, right carotid.  3.  Right carotid angiogram, cervical and cerebral.     INDICATION FOR PROCEDURE:  This is a pleasant gentleman who was admitted for   COVID pneumonia.  Yesterday, the patient developed transient left arm   weakness, which lasted for about an hour.  A CTA was obtained at 9 p.m. and   showed evidence of thrombus in the common carotid.  For some reasons, vascular   surgery was not consulted until today late morning.  Discussion was made with   the patient.  He would like to undergo above procedures, fully understanding   all risks.     DESCRIPTION OF PROCEDURE:  Informed consent was obtained.  The patient was   taken to the operating room and was placed in the supine position.  Sequential   compression devices were applied.  The patient was given Ancef intravenously.    General anesthesia was induced.     Next, the patient's right neck and chest were sterilely prepped and draped in   the normal fashion gently.  A timeout procedure was done.  An incision was   made anterior to the sternocleidomastoid muscle.  The incision was extended   through subcutaneous tissue and platysma muscle using the electrocautery.  The   sternocleidomastoid muscle was dissected on its medial aspect.  The internal   jugular vein was also dissected medially.  The facial vein and other venous   tributaries were ligated with 3-0 silk ties, clipped with Hemoclip and sharply   divided.     Next, the common carotid artery was carefully dissected free from the   surrounding tissues from the base of the neck to distally.     The patient was given  heparin 4000 units intravenously.  After the heparin was   allowed to circulate systemically for 3 minutes, vascular control of the   distal common carotid artery was obtained with vascular clamps.  Vascular   control of the common carotid artery near its base was also obtained with   vascular clamps.  A transverse arteriotomy was made.  The distal common   carotid artery clamp was removed.  There was poor backbleeding seen.    Thrombectomy of the distal common carotid artery was performed using a #3   Patti thromboembolectomy catheter.  Organized thrombus was evacuated.    Excellent backbleeding was seen.  The distal common carotid artery was clamped   with vascular clamps.     Next, the vascular clamp at the base of the common carotid artery was   temporarily released and excellent inflow was seen with no thrombus noted.    The vascular clamp was reapplied.  The arteriotomy was repaired with   interrupted 6-0 Prolene sutures.  Prior to completing the repair, backbleeding   and flushing were obtained.  The repair was completed.  Flow was restored.     Next, the common carotid artery at the base was cannulated using 20-gauge   angiocatheter.  A right cervical and cerebral carotid angiogram was obtained.    The internal and common carotid arteries were found to be patent with no   thrombus noted.  The angiocatheter was removed.  The puncture site was   repaired with 6-0 Prolene suture.     The wound was irrigated and was found to have excellent hemostasis.     Through a separate stab incision inferiorly, a 7 mm David-Hawk drain was   brought into the operative field, cut to appropriate length, and secured to   the skin with 3-0 nylon suture.  The platysma muscle was reapproximated over   the drain with running 3-0 Vicryl suture.  The skin edges were reapproximated   with 4-0 Monocryl suture in a subcuticular manner.  The wounds were cleaned   and sterile dressings were applied.     ESTIMATED BLOOD LOSS:  50  mL.     INTRAVENOUS FLUIDS: 500 mL.     CONTRAST USED:  10 mL Visipaque.     Sponge and instrument count was correct x2.     The patient was then awakened, extubated and taken to recovery area in stable   Condition with intact baseline neurological examination.        ______________________________  MD VALENTÍN Buckner/ARBEN/SCOT    DD:  01/27/2021 17:21  DT:  01/27/2021 18:03    Job#:  [id number]    CC:DALLAS BURROUGHS MD

## 2021-01-28 NOTE — OR NURSING
PACU Assist RN:  Spoke with daughter (Myrna).  Updated with pt condition and PACU timeline for return to floor.

## 2021-01-28 NOTE — ANESTHESIA TIME REPORT
Anesthesia Start and Stop Event Times     Date Time Event    1/27/2021 1520 Ready for Procedure     1526 Anesthesia Start     1722 Anesthesia Stop        Responsible Staff  01/27/21    Name Role Begin End    Ritchie Crawford M.D. Anesth 1526 1722        Preop Diagnosis (Free Text):  Pre-op Diagnosis     Thrombosis of right common carotid artery        Preop Diagnosis (Codes):    Post op Diagnosis  Thrombosis of right common carotid artery      Premium Reason  A. 3PM - 7AM    Comments: covid positive

## 2021-01-29 ENCOUNTER — APPOINTMENT (OUTPATIENT)
Dept: RADIOLOGY | Facility: MEDICAL CENTER | Age: 46
DRG: 853 | End: 2021-01-29
Attending: INTERNAL MEDICINE
Payer: OTHER MISCELLANEOUS

## 2021-01-29 LAB
ALBUMIN SERPL BCP-MCNC: 3 G/DL (ref 3.2–4.9)
APTT PPP: 29.3 SEC (ref 24.7–36)
BASOPHILS # BLD AUTO: 0.3 % (ref 0–1.8)
BASOPHILS # BLD: 0.04 K/UL (ref 0–0.12)
BUN SERPL-MCNC: 13 MG/DL (ref 8–22)
CALCIUM SERPL-MCNC: 8.3 MG/DL (ref 8.5–10.5)
CHLORIDE SERPL-SCNC: 97 MMOL/L (ref 96–112)
CO2 SERPL-SCNC: 24 MMOL/L (ref 20–33)
CREAT SERPL-MCNC: 0.62 MG/DL (ref 0.5–1.4)
EOSINOPHIL # BLD AUTO: 0.03 K/UL (ref 0–0.51)
EOSINOPHIL NFR BLD: 0.3 % (ref 0–6.9)
ERYTHROCYTE [DISTWIDTH] IN BLOOD BY AUTOMATED COUNT: 44.5 FL (ref 35.9–50)
GLUCOSE BLD-MCNC: 127 MG/DL (ref 65–99)
GLUCOSE BLD-MCNC: 221 MG/DL (ref 65–99)
GLUCOSE BLD-MCNC: 89 MG/DL (ref 65–99)
GLUCOSE BLD-MCNC: 96 MG/DL (ref 65–99)
GLUCOSE SERPL-MCNC: 205 MG/DL (ref 65–99)
HCT VFR BLD AUTO: 40.2 % (ref 42–52)
HGB BLD-MCNC: 12.4 G/DL (ref 14–18)
IMM GRANULOCYTES # BLD AUTO: 0.19 K/UL (ref 0–0.11)
IMM GRANULOCYTES NFR BLD AUTO: 1.6 % (ref 0–0.9)
INR PPP: 1.02 (ref 0.87–1.13)
LYMPHOCYTES # BLD AUTO: 0.79 K/UL (ref 1–4.8)
LYMPHOCYTES NFR BLD: 6.8 % (ref 22–41)
MAGNESIUM SERPL-MCNC: 1.9 MG/DL (ref 1.5–2.5)
MCH RBC QN AUTO: 26.8 PG (ref 27–33)
MCHC RBC AUTO-ENTMCNC: 30.8 G/DL (ref 33.7–35.3)
MCV RBC AUTO: 87 FL (ref 81.4–97.8)
MONOCYTES # BLD AUTO: 0.34 K/UL (ref 0–0.85)
MONOCYTES NFR BLD AUTO: 2.9 % (ref 0–13.4)
NEUTROPHILS # BLD AUTO: 10.22 K/UL (ref 1.82–7.42)
NEUTROPHILS NFR BLD: 88.1 % (ref 44–72)
NRBC # BLD AUTO: 0 K/UL
NRBC BLD-RTO: 0 /100 WBC
PHOSPHATE SERPL-MCNC: 2.6 MG/DL (ref 2.5–4.5)
PLATELET # BLD AUTO: 844 K/UL (ref 164–446)
PMV BLD AUTO: 10 FL (ref 9–12.9)
POTASSIUM SERPL-SCNC: 4.1 MMOL/L (ref 3.6–5.5)
PROTHROMBIN TIME: 13.7 SEC (ref 12–14.6)
RBC # BLD AUTO: 4.62 M/UL (ref 4.7–6.1)
SODIUM SERPL-SCNC: 130 MMOL/L (ref 135–145)
UFH PPP CHRO-ACNC: <0.1 IU/ML
UFH PPP CHRO-ACNC: <0.1 IU/ML
WBC # BLD AUTO: 11.6 K/UL (ref 4.8–10.8)

## 2021-01-29 PROCEDURE — A9270 NON-COVERED ITEM OR SERVICE: HCPCS | Performed by: INTERNAL MEDICINE

## 2021-01-29 PROCEDURE — 80069 RENAL FUNCTION PANEL: CPT

## 2021-01-29 PROCEDURE — 82962 GLUCOSE BLOOD TEST: CPT | Mod: 91

## 2021-01-29 PROCEDURE — 700111 HCHG RX REV CODE 636 W/ 250 OVERRIDE (IP): Performed by: STUDENT IN AN ORGANIZED HEALTH CARE EDUCATION/TRAINING PROGRAM

## 2021-01-29 PROCEDURE — 85730 THROMBOPLASTIN TIME PARTIAL: CPT

## 2021-01-29 PROCEDURE — 700111 HCHG RX REV CODE 636 W/ 250 OVERRIDE (IP): Performed by: SURGERY

## 2021-01-29 PROCEDURE — 85520 HEPARIN ASSAY: CPT | Mod: 91

## 2021-01-29 PROCEDURE — 70551 MRI BRAIN STEM W/O DYE: CPT

## 2021-01-29 PROCEDURE — 770020 HCHG ROOM/CARE - TELE (206)

## 2021-01-29 PROCEDURE — 700111 HCHG RX REV CODE 636 W/ 250 OVERRIDE (IP): Performed by: INTERNAL MEDICINE

## 2021-01-29 PROCEDURE — A9270 NON-COVERED ITEM OR SERVICE: HCPCS | Performed by: STUDENT IN AN ORGANIZED HEALTH CARE EDUCATION/TRAINING PROGRAM

## 2021-01-29 PROCEDURE — 700102 HCHG RX REV CODE 250 W/ 637 OVERRIDE(OP): Performed by: INTERNAL MEDICINE

## 2021-01-29 PROCEDURE — 85025 COMPLETE CBC W/AUTO DIFF WBC: CPT

## 2021-01-29 PROCEDURE — 85610 PROTHROMBIN TIME: CPT

## 2021-01-29 PROCEDURE — 36415 COLL VENOUS BLD VENIPUNCTURE: CPT

## 2021-01-29 PROCEDURE — 700102 HCHG RX REV CODE 250 W/ 637 OVERRIDE(OP): Performed by: STUDENT IN AN ORGANIZED HEALTH CARE EDUCATION/TRAINING PROGRAM

## 2021-01-29 PROCEDURE — 83735 ASSAY OF MAGNESIUM: CPT

## 2021-01-29 PROCEDURE — 99232 SBSQ HOSP IP/OBS MODERATE 35: CPT | Performed by: INTERNAL MEDICINE

## 2021-01-29 RX ORDER — AMOXICILLIN 250 MG
2 CAPSULE ORAL 2 TIMES DAILY
Status: DISCONTINUED | OUTPATIENT
Start: 2021-01-29 | End: 2021-02-01 | Stop reason: HOSPADM

## 2021-01-29 RX ORDER — HEPARIN SODIUM 5000 [USP'U]/100ML
0-30 INJECTION, SOLUTION INTRAVENOUS CONTINUOUS
Status: DISCONTINUED | OUTPATIENT
Start: 2021-01-29 | End: 2021-02-01 | Stop reason: HOSPADM

## 2021-01-29 RX ORDER — POLYETHYLENE GLYCOL 3350 17 G/17G
1 POWDER, FOR SOLUTION ORAL
Status: DISCONTINUED | OUTPATIENT
Start: 2021-01-29 | End: 2021-02-01 | Stop reason: HOSPADM

## 2021-01-29 RX ORDER — WARFARIN SODIUM 5 MG/1
5 TABLET ORAL DAILY
Status: DISCONTINUED | OUTPATIENT
Start: 2021-01-29 | End: 2021-01-30

## 2021-01-29 RX ORDER — BISACODYL 10 MG
10 SUPPOSITORY, RECTAL RECTAL
Status: DISCONTINUED | OUTPATIENT
Start: 2021-01-29 | End: 2021-02-01 | Stop reason: HOSPADM

## 2021-01-29 RX ADMIN — WARFARIN SODIUM 5 MG: 5 TABLET ORAL at 17:33

## 2021-01-29 RX ADMIN — CEFAZOLIN SODIUM 2 G: 2 INJECTION, SOLUTION INTRAVENOUS at 05:47

## 2021-01-29 RX ADMIN — HEPARIN SODIUM 18 UNITS/KG/HR: 5000 INJECTION, SOLUTION INTRAVENOUS at 13:20

## 2021-01-29 RX ADMIN — CEFAZOLIN SODIUM 2 G: 2 INJECTION, SOLUTION INTRAVENOUS at 17:33

## 2021-01-29 RX ADMIN — ATORVASTATIN CALCIUM 80 MG: 80 TABLET, FILM COATED ORAL at 17:33

## 2021-01-29 RX ADMIN — TAMSULOSIN HYDROCHLORIDE 0.4 MG: 0.4 CAPSULE ORAL at 08:33

## 2021-01-29 RX ADMIN — THERA TABS 1 TABLET: TAB at 05:48

## 2021-01-29 RX ADMIN — INSULIN HUMAN 2 UNITS: 100 INJECTION, SOLUTION PARENTERAL at 11:37

## 2021-01-29 RX ADMIN — DEXAMETHASONE SODIUM PHOSPHATE 6 MG: 4 INJECTION, SOLUTION INTRA-ARTICULAR; INTRALESIONAL; INTRAMUSCULAR; INTRAVENOUS; SOFT TISSUE at 05:47

## 2021-01-29 RX ADMIN — CEFAZOLIN SODIUM 2 G: 2 INJECTION, SOLUTION INTRAVENOUS at 20:59

## 2021-01-29 ASSESSMENT — COGNITIVE AND FUNCTIONAL STATUS - GENERAL
TOILETING: A LOT
HELP NEEDED FOR BATHING: A LITTLE
STANDING UP FROM CHAIR USING ARMS: A LITTLE
TURNING FROM BACK TO SIDE WHILE IN FLAT BAD: A LITTLE
MOVING FROM LYING ON BACK TO SITTING ON SIDE OF FLAT BED: A LITTLE
SUGGESTED CMS G CODE MODIFIER DAILY ACTIVITY: CK
CLIMB 3 TO 5 STEPS WITH RAILING: A LOT
WALKING IN HOSPITAL ROOM: A LITTLE
DRESSING REGULAR UPPER BODY CLOTHING: A LITTLE
MOBILITY SCORE: 18
DRESSING REGULAR LOWER BODY CLOTHING: A LITTLE
SUGGESTED CMS G CODE MODIFIER MOBILITY: CK
DAILY ACTIVITIY SCORE: 19

## 2021-01-29 ASSESSMENT — PAIN DESCRIPTION - PAIN TYPE
TYPE: ACUTE PAIN

## 2021-01-29 ASSESSMENT — FIBROSIS 4 INDEX
FIB4 SCORE: 0.28
FIB4 SCORE: 0.28

## 2021-01-29 NOTE — PROGRESS NOTES
MRI called and notified RN they are able to take patient. MD Winkler notified to receive order to come off of the tele box. Attempted to page MD Bella to receive order to pause heparin drip for MRI. Awaiting call back.

## 2021-01-29 NOTE — CONSULTS
Diabetes education: Pt newly dx with diabetes and admitted with blood sugar of 147 and Hg a1c of 7.5%. Pt is COVID +.  Pt is currently on decadron 6 mg with 3 more dose due, and regular insulin sliding scale ac and hs. Blood sugars are 96, 218( 2 units) and 125.  Pt had a thrombectomy yesterday. CDE attempted to call pt on her cell but she was down having an MRI.  CDE left a [place] [facility] DM book with nursing to give to patient.  Plan:CDE to follow up with pt on Monday.

## 2021-01-29 NOTE — PROGRESS NOTES
Discussed with MD Trinidad regarding order to come off heparin drip and tele monitoring for MRI. MD is okay for patient to come off of heparin during MRI and to be put back on when back on unit

## 2021-01-29 NOTE — PROGRESS NOTES
Received report from day shift RN. Pt is A&Ox4. Pt is on 4 L NC. No acute distress at this time. No pain, signs of hematoma or swelling at incision site from thrombectomy. Assessment complete, vital signs stable and labs noted. Pt is not in acute distress at this time. Call light and belongings are within reach. Pt is educated to call light and fall precautions. Bed is locked and in lowest position. Pt is educated via  to current plan of care and heparin medication. No further needs at this time. Will continue to monitor

## 2021-01-29 NOTE — PROGRESS NOTES
Awake, no complaints.  No headache.  AF, VSS     Gen - Pleasant male in NAD.  HEENT - Incision c/d/i with mild erythema at edges.  No hematoma.  Trachea mid line.  SOL with small amount sanguinous output.  Neurol - Non focal.     Assessment: S/P removal of R carotid thrombus and angiogram.     Plan:  Doing well from vascular standpoint.  I removed SOL.  OK to start Heparin gtt weight-based protocol, NO BOLUS AT ANY TIME, to avoid bleeding problem.  Transition to Warfarin as you are doing.  Continue Ancef for the mild erythema at the edges of the incision.     Discussed with patient and RN.    Will follow peripherally.  Please call for questions / concerns.

## 2021-01-29 NOTE — PROGRESS NOTES
Hospital Medicine Daily Progress Note    Date of Service  1/29/2021    Chief Complaint  45 y.o. male admitted 1/22/2021 with SOB, cough    Hospital Course  A 45-year-old man with h/o HTN, BPH, snoring, obesity, recently diagnosed COVID 19 infection (1/17/21) presented with worsening SOB, cough for one week.  Saturation was 74% on room air, improved to 97% on 6 L NC oxygen. Leukocytosis  13.1. CXR showed diffuse interstitial infiltrates concerning for COVID 19 infection.  Blood culture negative so far. CTA neck low-density filling defect in the distal right common carotid artery, appearance most compatible with thrombus adherent to the wall, results in approximately 65% stenosis.    Interval Problem Update  Patient was seen and examined at bedside.  No acute events overnight. Patient is resting comfortably in bed and in no acute distress.  Interview and exam completed with assistance of  service.     Decadron day 7/10  Lasix 20mg daily  5L nasal cannula  Heparin infusion with transition to coumadin  S/p thrombectomy  Await MRI brain    Consultants/Specialty  Vascular    Code Status  Full Code    Disposition  TBD    Review of Systems  Review of Systems   Constitutional: Positive for malaise/fatigue. Negative for chills and fever.   HENT: Negative.    Eyes: Negative.    Respiratory: Positive for cough and shortness of breath.    Cardiovascular: Negative for chest pain.   Gastrointestinal: Negative for abdominal pain, nausea and vomiting.   Genitourinary: Negative for dysuria.   Musculoskeletal: Negative for myalgias.   Skin: Negative for rash.   Neurological: Positive for weakness. Negative for headaches.     Physical Exam  Temp:  [36.3 °C (97.3 °F)-37.2 °C (99 °F)] 36.7 °C (98.1 °F)  Pulse:  [78-94] 84  Resp:  [18-24] 19  BP: (114-135)/(73-90) 135/81  SpO2:  [93 %-97 %] 96 %    Physical Exam  Vitals signs and nursing note reviewed.   Constitutional:       Appearance: He is obese. He is ill-appearing.    HENT:      Head: Normocephalic.      Mouth/Throat: no erythema, no confestion     Mouth: Mucous membranes are moist.      Pharynx: Oropharynx is clear.   Eyes:      Pupils: Pupils are equal, round, and reactive to light.   Neck:      Musculoskeletal: Normal range of motion.   Cardiovascular:      Rate and Rhythm: Normal rate and regular rhythm.      Heart sounds: Normal heart sounds.   Pulmonary:      Effort: Pulmonary effort is normal.      Breath sounds: faint crackles auscultated in lower lung bases bilaterally  Abdominal:      General: Abdomen is flat. Bowel sounds are normal.      Tenderness: There is no abdominal tenderness.   Musculoskeletal: Normal range of motion.   Skin:     General: Skin is warm.   Neurological:      General: No focal deficit present.      Mental Status: He is alert and oriented to person, place, and time.     Fluids    Intake/Output Summary (Last 24 hours) at 1/29/2021 1456  Last data filed at 1/29/2021 1400  Gross per 24 hour   Intake 1520 ml   Output 1705 ml   Net -185 ml       Laboratory  Recent Labs     01/27/21  0802 01/28/21  0708 01/29/21  0928   WBC 12.7* 12.6* 11.6*   RBC 5.01 4.54* 4.62*   HEMOGLOBIN 13.8* 12.7* 12.4*   HEMATOCRIT 42.8 39.2* 40.2*   MCV 85.4 86.3 87.0   MCH 27.5 28.0 26.8*   MCHC 32.2* 32.4* 30.8*   RDW 43.8 45.1 44.5   PLATELETCT 733* 778* 844*   MPV 9.9 9.6 10.0     Recent Labs     01/27/21  0802 01/28/21  0708 01/29/21  0928   SODIUM 132* 141 130*   POTASSIUM 4.1 4.4 4.1   CHLORIDE 98 103 97   CO2 17* 24 24   GLUCOSE 85 111* 205*   BUN 17 15 13   CREATININE 0.69 0.73 0.62   CALCIUM 9.0 8.6 8.3*     Recent Labs     01/27/21  0802 01/27/21  1956 01/29/21  1151   APTT 28.4 35.4 29.3   INR 1.07  --  1.02               Imaging  EC-ECHOCARDIOGRAM LTD W/O CONT   Final Result      DX-PORTABLE FLUOROSCOPY < 1 HOUR   Final Result      Portable fluoroscopy utilized for 15 seconds.         INTERPRETING LOCATION: [address], [id number]      CT-CTA HEAD WITH &  W/O-POST PROCESS   Final Result         1.  No large vessel occlusion or aneurysm appreciated.      CT-CTA NECK WITH & W/O-POST PROCESSING   Final Result         1.  Low-density filling defect in the distal right common carotid artery, appearance most compatible with thrombus adherent to the wall, results in approximately 65% stenosis.   2.  Peripheral reticular opacities in the bilateral lung apices, appearance favors Covid infiltrates.      These findings were discussed with the patient's clinician, Jeremy Townsend, on 1/26/2021 10:20 PM.         DX-CHEST-PORTABLE (1 VIEW)   Final Result      Multifocal bilateral pneumonia.      MR-BRAIN-W/O    (Results Pending)        Assessment/Plan  * Acute hypoxemic respiratory failure due to COVID-19 (HCC)- (present on admission)  Assessment & Plan  COVID positive 1/17/21  Contact, droplet, eye precaution  Oxygen per protocol  Decadron day 7/10  Lasix 20mg daily  Trend inflammatory markers  5L nasal cannula    Thrombosis of right common carotid artery  Assessment & Plan  As identified on CTA neck: Low-density filling defect in the distal right common carotid artery, appearance most compatible with thrombus adherent to the wall, results in approximately 65% stenosis.    - Heparin infusion  - s/p thrombectomy  - bridge to coumadin per vascular  - 2d echo: no thrombus noted, EF 75%  - await MRI brain    Elevated glucose- (present on admission)  Assessment & Plan  Hemoglobin A1c 7.5  Newly diagnosed DM  Diet consistent carbohydrates  POCT glucose  Sliding scale insulin  Diabetic education    HTN (hypertension)- (present on admission)  Assessment & Plan  Controlled  Continue home losartan.    Numbness and tingling of right leg- (present on admission)  Assessment & Plan  Possibly due neuropathy  Consider gabapentin if persistent  D-dimer 1.21    Obesity (BMI 30.0-34.9)- (present on admission)  Assessment & Plan  BMI 34.   healthy lifestyle changes when stable     VTE  prophylaxis: heparin gtt

## 2021-01-29 NOTE — PROGRESS NOTES
Discussed with MRI tech Marcia Deal about taking patient down to MRI tonight. Marcia said they could not take him as they do not have two techs available at this time. Per Marcia she will request he be put on the list in the morning

## 2021-01-29 NOTE — PROGRESS NOTES
Inpatient Anticoagulation Service Note    Date: 1/29/2021    Reason for Anticoagulation: Other (Comments)(Arterial thrombosis)   Target INR: 2.0 to 2.5         Hemoglobin Value: (!) 12.4  Hematocrit Value: (!) 40.2  Lab Platelet Value: (!) 844    INR from last 7 days     Date/Time INR Value    01/29/21 1151  1.02    01/27/21 0802  1.07        Dose from last 7 days     Date/Time Dose (mg)    01/29/21 1400  5        Significant Interactions: Antibiotics, Corticosteroids  Bridge Therapy: Yes  Date of Last VTE Event: 01/26/21  Bridge Therapy Start Date: 01/29/21  Days of Overlap Therapy: 0  INR Value Greater than 2 Prior to Discontinuation of Parenteral Anticoagulation: Not Applicable     Comments:  44 y/o M w/ PMHx HTN, BPH, obesity, positive COVID 19 infection (1/17/21) . CTA neck low-density filling defect in the distal right common carotid artery, appearance most compatible with thrombus adherent to the wall, results in approximately 65% stenosis. DDI noted. No documented signs of overt bleeding. INR below goal.     Plan:  Warfarin 5mg po daily , INR in AM.   Education Material Provided?: No (New start warfarin)  Pharmacist suggested discharge dosing: Warfarin 4-5 mg po daily , follow up with anticoagulation clinic after discharge.      Reymundo Constantino, PharmD, BCPS

## 2021-01-29 NOTE — PROGRESS NOTES
Received bedside report from RN, pt care assumed, VSS, pt assessment complete. Pt AAOx4, with no complaint of pain at this time. No signs of acute distress noted at this time. Plan of care discussed with pt and verbalizes no questions. Pt denies any additional needs at this time. Bed locked/in lowest position, pt educated on fall risk and verbalized understanding, call light within reach, hourly rounding initiated.

## 2021-01-30 DIAGNOSIS — I63.031 CEREBROVASCULAR ACCIDENT (CVA) DUE TO THROMBOSIS OF RIGHT CAROTID ARTERY (HCC): Primary | ICD-10-CM

## 2021-01-30 PROBLEM — I63.9 FOCAL INFARCTION OF BRAIN (HCC): Status: ACTIVE | Noted: 2021-01-30

## 2021-01-30 PROBLEM — E66.9 OBESITY (BMI 30.0-34.9): Status: RESOLVED | Noted: 2021-01-23 | Resolved: 2021-01-30

## 2021-01-30 PROBLEM — R20.2 NUMBNESS AND TINGLING OF RIGHT LEG: Status: RESOLVED | Noted: 2021-01-23 | Resolved: 2021-01-30

## 2021-01-30 PROBLEM — R20.0 NUMBNESS AND TINGLING OF RIGHT LEG: Status: RESOLVED | Noted: 2021-01-23 | Resolved: 2021-01-30

## 2021-01-30 LAB
ALBUMIN SERPL BCP-MCNC: 3.6 G/DL (ref 3.2–4.9)
BASOPHILS # BLD AUTO: 0.3 % (ref 0–1.8)
BASOPHILS # BLD: 0.04 K/UL (ref 0–0.12)
BUN SERPL-MCNC: 15 MG/DL (ref 8–22)
CALCIUM SERPL-MCNC: 9 MG/DL (ref 8.5–10.5)
CHLORIDE SERPL-SCNC: 99 MMOL/L (ref 96–112)
CO2 SERPL-SCNC: 25 MMOL/L (ref 20–33)
CREAT SERPL-MCNC: 0.7 MG/DL (ref 0.5–1.4)
EOSINOPHIL # BLD AUTO: 0.23 K/UL (ref 0–0.51)
EOSINOPHIL NFR BLD: 1.7 % (ref 0–6.9)
ERYTHROCYTE [DISTWIDTH] IN BLOOD BY AUTOMATED COUNT: 43.9 FL (ref 35.9–50)
GLUCOSE BLD-MCNC: 118 MG/DL (ref 65–99)
GLUCOSE BLD-MCNC: 124 MG/DL (ref 65–99)
GLUCOSE BLD-MCNC: 140 MG/DL (ref 65–99)
GLUCOSE BLD-MCNC: 212 MG/DL (ref 65–99)
GLUCOSE SERPL-MCNC: 98 MG/DL (ref 65–99)
HCT VFR BLD AUTO: 42.6 % (ref 42–52)
HGB BLD-MCNC: 13.8 G/DL (ref 14–18)
IMM GRANULOCYTES # BLD AUTO: 0.28 K/UL (ref 0–0.11)
IMM GRANULOCYTES NFR BLD AUTO: 2 % (ref 0–0.9)
INR PPP: 1.42 (ref 0.87–1.13)
LYMPHOCYTES # BLD AUTO: 3.09 K/UL (ref 1–4.8)
LYMPHOCYTES NFR BLD: 22.2 % (ref 22–41)
MAGNESIUM SERPL-MCNC: 2.1 MG/DL (ref 1.5–2.5)
MCH RBC QN AUTO: 28 PG (ref 27–33)
MCHC RBC AUTO-ENTMCNC: 32.4 G/DL (ref 33.7–35.3)
MCV RBC AUTO: 86.6 FL (ref 81.4–97.8)
MONOCYTES # BLD AUTO: 0.8 K/UL (ref 0–0.85)
MONOCYTES NFR BLD AUTO: 5.8 % (ref 0–13.4)
NEUTROPHILS # BLD AUTO: 9.45 K/UL (ref 1.82–7.42)
NEUTROPHILS NFR BLD: 68 % (ref 44–72)
NRBC # BLD AUTO: 0 K/UL
NRBC BLD-RTO: 0 /100 WBC
PHOSPHATE SERPL-MCNC: 3.4 MG/DL (ref 2.5–4.5)
PLATELET # BLD AUTO: 908 K/UL (ref 164–446)
PMV BLD AUTO: 9.5 FL (ref 9–12.9)
POTASSIUM SERPL-SCNC: 4.1 MMOL/L (ref 3.6–5.5)
PROTHROMBIN TIME: 17.8 SEC (ref 12–14.6)
RBC # BLD AUTO: 4.92 M/UL (ref 4.7–6.1)
SODIUM SERPL-SCNC: 133 MMOL/L (ref 135–145)
UFH PPP CHRO-ACNC: 0.25 IU/ML
UFH PPP CHRO-ACNC: 0.28 IU/ML
UFH PPP CHRO-ACNC: 0.51 IU/ML
WBC # BLD AUTO: 13.9 K/UL (ref 4.8–10.8)

## 2021-01-30 PROCEDURE — 700102 HCHG RX REV CODE 250 W/ 637 OVERRIDE(OP): Performed by: INTERNAL MEDICINE

## 2021-01-30 PROCEDURE — 700111 HCHG RX REV CODE 636 W/ 250 OVERRIDE (IP): Performed by: STUDENT IN AN ORGANIZED HEALTH CARE EDUCATION/TRAINING PROGRAM

## 2021-01-30 PROCEDURE — 85025 COMPLETE CBC W/AUTO DIFF WBC: CPT

## 2021-01-30 PROCEDURE — A9270 NON-COVERED ITEM OR SERVICE: HCPCS | Performed by: STUDENT IN AN ORGANIZED HEALTH CARE EDUCATION/TRAINING PROGRAM

## 2021-01-30 PROCEDURE — 85610 PROTHROMBIN TIME: CPT

## 2021-01-30 PROCEDURE — 83735 ASSAY OF MAGNESIUM: CPT

## 2021-01-30 PROCEDURE — 700111 HCHG RX REV CODE 636 W/ 250 OVERRIDE (IP): Performed by: INTERNAL MEDICINE

## 2021-01-30 PROCEDURE — 700111 HCHG RX REV CODE 636 W/ 250 OVERRIDE (IP): Performed by: SURGERY

## 2021-01-30 PROCEDURE — 700102 HCHG RX REV CODE 250 W/ 637 OVERRIDE(OP): Performed by: STUDENT IN AN ORGANIZED HEALTH CARE EDUCATION/TRAINING PROGRAM

## 2021-01-30 PROCEDURE — 770020 HCHG ROOM/CARE - TELE (206)

## 2021-01-30 PROCEDURE — 80069 RENAL FUNCTION PANEL: CPT

## 2021-01-30 PROCEDURE — 99232 SBSQ HOSP IP/OBS MODERATE 35: CPT | Performed by: INTERNAL MEDICINE

## 2021-01-30 PROCEDURE — 82962 GLUCOSE BLOOD TEST: CPT

## 2021-01-30 PROCEDURE — A9270 NON-COVERED ITEM OR SERVICE: HCPCS | Performed by: INTERNAL MEDICINE

## 2021-01-30 PROCEDURE — 36415 COLL VENOUS BLD VENIPUNCTURE: CPT

## 2021-01-30 PROCEDURE — 85520 HEPARIN ASSAY: CPT | Mod: 91

## 2021-01-30 RX ORDER — WARFARIN SODIUM 4 MG/1
4 TABLET ORAL DAILY
Status: DISCONTINUED | OUTPATIENT
Start: 2021-01-30 | End: 2021-02-01 | Stop reason: HOSPADM

## 2021-01-30 RX ADMIN — HEPARIN SODIUM 24 UNITS/KG/HR: 5000 INJECTION, SOLUTION INTRAVENOUS at 06:32

## 2021-01-30 RX ADMIN — CEFAZOLIN SODIUM 2 G: 2 INJECTION, SOLUTION INTRAVENOUS at 21:19

## 2021-01-30 RX ADMIN — DEXAMETHASONE SODIUM PHOSPHATE 6 MG: 4 INJECTION, SOLUTION INTRA-ARTICULAR; INTRALESIONAL; INTRAMUSCULAR; INTRAVENOUS; SOFT TISSUE at 05:38

## 2021-01-30 RX ADMIN — INSULIN HUMAN 2 UNITS: 100 INJECTION, SOLUTION PARENTERAL at 12:21

## 2021-01-30 RX ADMIN — DOCUSATE SODIUM 50 MG AND SENNOSIDES 8.6 MG 2 TABLET: 8.6; 5 TABLET, FILM COATED ORAL at 05:30

## 2021-01-30 RX ADMIN — DOCUSATE SODIUM 50 MG AND SENNOSIDES 8.6 MG 2 TABLET: 8.6; 5 TABLET, FILM COATED ORAL at 00:05

## 2021-01-30 RX ADMIN — HEPARIN SODIUM 26 UNITS/KG/HR: 5000 INJECTION, SOLUTION INTRAVENOUS at 21:24

## 2021-01-30 RX ADMIN — WARFARIN SODIUM 4 MG: 4 TABLET ORAL at 17:53

## 2021-01-30 RX ADMIN — CEFAZOLIN SODIUM 2 G: 2 INJECTION, SOLUTION INTRAVENOUS at 05:30

## 2021-01-30 RX ADMIN — TAMSULOSIN HYDROCHLORIDE 0.4 MG: 0.4 CAPSULE ORAL at 10:29

## 2021-01-30 RX ADMIN — CEFAZOLIN SODIUM 2 G: 2 INJECTION, SOLUTION INTRAVENOUS at 15:29

## 2021-01-30 RX ADMIN — THERA TABS 1 TABLET: TAB at 05:37

## 2021-01-30 RX ADMIN — ATORVASTATIN CALCIUM 80 MG: 80 TABLET, FILM COATED ORAL at 17:52

## 2021-01-30 NOTE — PROGRESS NOTES
Received report from day shift RN. Pt is A&Ox4. Pt is on 4.5 L NC, saturing 95%. Assessment complete, vital signs stable and labs noted. Patient educated to call light. Call light and belongings within reach. Bed is locked and in lowest position. Fall precautions in place. Pt is not in acute distress at this time. No further needs at this time. Hourly rounding initiated

## 2021-01-30 NOTE — CONSULTS
Called by Dr. Winkler    45-year-old male currently admitted with Covid infection who on 1/26/2021 had neurological changes consisting of left upper extremity numbness and weakness.  A CTA of the head and neck was obtained showing a right common carotid artery thrombus with 65% stenosis.  The patient was not a TPA candidate after Dr. Robles was consulted over the phone.  The patient underwent a carotid endarterectomy with thrombectomy and subsequently was on a heparin drip.  He is currently being transitioned to oral anticoagulation.  An MRI of the brain without contrast was obtained showing several scattered infarcts in the right anterior circulation.  Overall, these infarcts are consistent with the vascular territory supplied by the carotid artery and are likely secondary to watershed injury from thrombus in the carotid.  From a neurological standpoint, the patient appears to have improved and is at baseline.  At this time, we have no further recommendations regarding management of thrombus other than anticoagulation.  Overall etiology for thrombus is likely secondary to Covid and hypercoagulable state from Covid.  I will place a referral for the patient to be seen in the stroke Bridge clinic.

## 2021-01-30 NOTE — PROGRESS NOTES
Messaged MD Marrero regarding initiating a bowel protocol. The patient's last reported bowel movement was 1/25. Bowel protocol ordered

## 2021-01-30 NOTE — PROGRESS NOTES
Inpatient Anticoagulation Service Note    Date: 1/30/2021    Reason for Anticoagulation: Other (Comments)(Arterial thrombosis)   Target INR: 2.0 to 2.5(Per MD )         Hemoglobin Value: (!) 13.8  Hematocrit Value: 42.6  Lab Platelet Value: (!) 908    INR from last 7 days     Date/Time INR Value    01/30/21 0932  (!) 1.42    01/29/21 1151  1.02    01/27/21 0802  1.07        Dose from last 7 days     Date/Time Dose (mg)    01/30/21 0900  4    01/29/21 1400  5        Significant Interactions: Antibiotics, Corticosteroids  Bridge Therapy: Yes  Date of Last VTE Event: 01/26/21  Bridge Therapy Start Date: 01/29/21  Days of Overlap Therapy: 1   INR Value Greater than 2 Prior to Discontinuation of Parenteral Anticoagulation: Not Applicable     Comments:  44 y/o M w/ PMHx HTN, BPH, obesity, positive COVID 19 infection (1/17/21) . CTA neck low-density filling defect in the distal right common carotid artery, appearance most compatible with thrombus adherent to the wall, results in approximately 65% stenosis. DDI noted. No documented signs of overt bleeding. INR below goal.      Plan:  Warfarin 4mg po daily , INR in AM.   Education Material Provided?: No (New start warfarin)  Pharmacist suggested discharge dosing: Warfarin 4-5 mg po daily , follow up with anticoagulation clinic after discharge.      Reymundo Constantino, PharmD, BCPS

## 2021-01-30 NOTE — PROGRESS NOTES
Bedside report received from night RN; assumed pt care. Pt assessment complete. Pt A&Ox4.  Reviewed plan of care with pt. Tele box on and rhythm verified. Chart and labs reviewed. Bed in lowest position, and 2 side rails up. Pt educated on call light; call light with in reach.

## 2021-01-30 NOTE — ASSESSMENT & PLAN NOTE
MRI brain demonstrating several infarcts in the right anterior circulation   Consistent with territory affected by right common carotid artery thrombus  S/p thrombectomy  No deficits at this time. No upper extremity weakness, speech in tact.  Bridge to coumadin  Follow up with neurology as out patient

## 2021-01-31 PROBLEM — D75.839 THROMBOCYTOSIS: Status: ACTIVE | Noted: 2021-01-31

## 2021-01-31 LAB
ALBUMIN SERPL BCP-MCNC: 3.8 G/DL (ref 3.2–4.9)
BASOPHILS # BLD AUTO: 0.5 % (ref 0–1.8)
BASOPHILS # BLD: 0.07 K/UL (ref 0–0.12)
BUN SERPL-MCNC: 16 MG/DL (ref 8–22)
CALCIUM SERPL-MCNC: 8.7 MG/DL (ref 8.5–10.5)
CHLORIDE SERPL-SCNC: 98 MMOL/L (ref 96–112)
CO2 SERPL-SCNC: 23 MMOL/L (ref 20–33)
CREAT SERPL-MCNC: 0.74 MG/DL (ref 0.5–1.4)
EOSINOPHIL # BLD AUTO: 0.17 K/UL (ref 0–0.51)
EOSINOPHIL NFR BLD: 1.1 % (ref 0–6.9)
ERYTHROCYTE [DISTWIDTH] IN BLOOD BY AUTOMATED COUNT: 43.5 FL (ref 35.9–50)
GLUCOSE BLD-MCNC: 112 MG/DL (ref 65–99)
GLUCOSE BLD-MCNC: 115 MG/DL (ref 65–99)
GLUCOSE BLD-MCNC: 165 MG/DL (ref 65–99)
GLUCOSE SERPL-MCNC: 120 MG/DL (ref 65–99)
HCT VFR BLD AUTO: 45.3 % (ref 42–52)
HGB BLD-MCNC: 14.5 G/DL (ref 14–18)
IMM GRANULOCYTES # BLD AUTO: 0.43 K/UL (ref 0–0.11)
IMM GRANULOCYTES NFR BLD AUTO: 2.8 % (ref 0–0.9)
INR PPP: 1.81 (ref 0.87–1.13)
LYMPHOCYTES # BLD AUTO: 3.11 K/UL (ref 1–4.8)
LYMPHOCYTES NFR BLD: 20.2 % (ref 22–41)
MAGNESIUM SERPL-MCNC: 2.1 MG/DL (ref 1.5–2.5)
MCH RBC QN AUTO: 27.4 PG (ref 27–33)
MCHC RBC AUTO-ENTMCNC: 32 G/DL (ref 33.7–35.3)
MCV RBC AUTO: 85.5 FL (ref 81.4–97.8)
MONOCYTES # BLD AUTO: 0.93 K/UL (ref 0–0.85)
MONOCYTES NFR BLD AUTO: 6 % (ref 0–13.4)
NEUTROPHILS # BLD AUTO: 10.68 K/UL (ref 1.82–7.42)
NEUTROPHILS NFR BLD: 69.4 % (ref 44–72)
NRBC # BLD AUTO: 0 K/UL
NRBC BLD-RTO: 0 /100 WBC
PHOSPHATE SERPL-MCNC: 3.8 MG/DL (ref 2.5–4.5)
PLATELET # BLD AUTO: 1032 K/UL (ref 164–446)
PMV BLD AUTO: 10 FL (ref 9–12.9)
POTASSIUM SERPL-SCNC: 3.8 MMOL/L (ref 3.6–5.5)
PROTHROMBIN TIME: 21.6 SEC (ref 12–14.6)
RBC # BLD AUTO: 5.3 M/UL (ref 4.7–6.1)
SODIUM SERPL-SCNC: 135 MMOL/L (ref 135–145)
UFH PPP CHRO-ACNC: 0.44 IU/ML
WBC # BLD AUTO: 15.4 K/UL (ref 4.8–10.8)

## 2021-01-31 PROCEDURE — 700102 HCHG RX REV CODE 250 W/ 637 OVERRIDE(OP): Performed by: STUDENT IN AN ORGANIZED HEALTH CARE EDUCATION/TRAINING PROGRAM

## 2021-01-31 PROCEDURE — A9270 NON-COVERED ITEM OR SERVICE: HCPCS | Performed by: SURGERY

## 2021-01-31 PROCEDURE — A9270 NON-COVERED ITEM OR SERVICE: HCPCS | Performed by: STUDENT IN AN ORGANIZED HEALTH CARE EDUCATION/TRAINING PROGRAM

## 2021-01-31 PROCEDURE — 85610 PROTHROMBIN TIME: CPT

## 2021-01-31 PROCEDURE — A9270 NON-COVERED ITEM OR SERVICE: HCPCS | Performed by: INTERNAL MEDICINE

## 2021-01-31 PROCEDURE — 83735 ASSAY OF MAGNESIUM: CPT

## 2021-01-31 PROCEDURE — 700102 HCHG RX REV CODE 250 W/ 637 OVERRIDE(OP): Performed by: INTERNAL MEDICINE

## 2021-01-31 PROCEDURE — 82962 GLUCOSE BLOOD TEST: CPT | Mod: 91

## 2021-01-31 PROCEDURE — 80069 RENAL FUNCTION PANEL: CPT

## 2021-01-31 PROCEDURE — 36415 COLL VENOUS BLD VENIPUNCTURE: CPT

## 2021-01-31 PROCEDURE — 85520 HEPARIN ASSAY: CPT

## 2021-01-31 PROCEDURE — 770020 HCHG ROOM/CARE - TELE (206)

## 2021-01-31 PROCEDURE — 700102 HCHG RX REV CODE 250 W/ 637 OVERRIDE(OP): Performed by: SURGERY

## 2021-01-31 PROCEDURE — 85025 COMPLETE CBC W/AUTO DIFF WBC: CPT

## 2021-01-31 PROCEDURE — 700111 HCHG RX REV CODE 636 W/ 250 OVERRIDE (IP): Performed by: STUDENT IN AN ORGANIZED HEALTH CARE EDUCATION/TRAINING PROGRAM

## 2021-01-31 PROCEDURE — 700111 HCHG RX REV CODE 636 W/ 250 OVERRIDE (IP): Performed by: INTERNAL MEDICINE

## 2021-01-31 PROCEDURE — 99232 SBSQ HOSP IP/OBS MODERATE 35: CPT | Performed by: INTERNAL MEDICINE

## 2021-01-31 RX ORDER — LOPERAMIDE HYDROCHLORIDE 2 MG/1
2 CAPSULE ORAL 4 TIMES DAILY PRN
Status: DISCONTINUED | OUTPATIENT
Start: 2021-01-31 | End: 2021-02-01 | Stop reason: HOSPADM

## 2021-01-31 RX ADMIN — HEPARIN SODIUM 17.93 UNITS/KG/HR: 5000 INJECTION, SOLUTION INTRAVENOUS at 23:43

## 2021-01-31 RX ADMIN — HEPARIN SODIUM 26 UNITS/KG/HR: 5000 INJECTION, SOLUTION INTRAVENOUS at 10:17

## 2021-01-31 RX ADMIN — INSULIN HUMAN 1 UNITS: 100 INJECTION, SOLUTION PARENTERAL at 21:00

## 2021-01-31 RX ADMIN — DEXAMETHASONE SODIUM PHOSPHATE 6 MG: 4 INJECTION, SOLUTION INTRA-ARTICULAR; INTRALESIONAL; INTRAMUSCULAR; INTRAVENOUS; SOFT TISSUE at 06:15

## 2021-01-31 RX ADMIN — TAMSULOSIN HYDROCHLORIDE 0.4 MG: 0.4 CAPSULE ORAL at 09:12

## 2021-01-31 RX ADMIN — ASPIRIN 81 MG: 81 TABLET, COATED ORAL at 12:44

## 2021-01-31 RX ADMIN — ATORVASTATIN CALCIUM 80 MG: 80 TABLET, FILM COATED ORAL at 17:08

## 2021-01-31 RX ADMIN — WARFARIN SODIUM 4 MG: 4 TABLET ORAL at 17:07

## 2021-01-31 RX ADMIN — THERA TABS 1 TABLET: TAB at 06:15

## 2021-01-31 RX ADMIN — DOCUSATE SODIUM 50 MG AND SENNOSIDES 8.6 MG 2 TABLET: 8.6; 5 TABLET, FILM COATED ORAL at 17:08

## 2021-01-31 RX ADMIN — LOPERAMIDE HYDROCHLORIDE 2 MG: 2 CAPSULE ORAL at 06:15

## 2021-01-31 ASSESSMENT — PAIN DESCRIPTION - PAIN TYPE: TYPE: ACUTE PAIN

## 2021-01-31 ASSESSMENT — FIBROSIS 4 INDEX: FIB4 SCORE: 0.23

## 2021-01-31 NOTE — PROGRESS NOTES
Lab notified this RN of a critical platelet count of 1,032. Paged MD Marrero. Per MD Marrero no new orders needed at this time.

## 2021-01-31 NOTE — PROGRESS NOTES
Hospital Medicine Daily Progress Note    Date of Service  1/31/2021    Chief Complaint  45 y.o. male admitted 1/22/2021 with SOB, cough    Hospital Course  A 45-year-old man with h/o HTN, BPH, snoring, obesity, recently diagnosed COVID 19 infection (1/17/21) presented with worsening SOB, cough for one week.  Saturation was 74% on room air, improved to 97% on 6 L NC oxygen. Leukocytosis  13.1. CXR showed diffuse interstitial infiltrates concerning for COVID 19 infection.  Blood culture negative so far. CTA neck low-density filling defect in the distal right common carotid artery, appearance most compatible with thrombus adherent to the wall, results in approximately 65% stenosis.    Interval Problem Update  Patient was seen and examined at bedside.  No acute events overnight. Patient is resting comfortably in bed and in no acute distress. Hystory and physical completed with  service.    Decadron day 9/10  Lasix 20mg daily  4L nasal cannula  Heparin infusion with transition to coumadin  S/p thrombectomy    Consultants/Specialty  Vascular    Code Status  Full Code    Disposition  Home with home O2 when medically clear    Review of Systems  Review of Systems   Constitutional: Positive for malaise/fatigue. Negative for chills and fever.   HENT: Negative.    Eyes: Negative.    Respiratory: Positive for cough and shortness of breath.    Cardiovascular: Negative for chest pain.   Gastrointestinal: Negative for abdominal pain, nausea and vomiting.   Genitourinary: Negative for dysuria.   Musculoskeletal: Negative for myalgias.   Skin: Negative for rash.   Neurological: Positive for weakness. Negative for headaches.     Physical Exam  Temp:  [36.2 °C (97.1 °F)-37.2 °C (98.9 °F)] 36.5 °C (97.7 °F)  Pulse:  [] 81  Resp:  [16-20] 16  BP: (114-134)/(62-95) 114/77  SpO2:  [92 %-97 %] 97 %    Physical Exam  Vitals signs and nursing note reviewed.   Constitutional:       Appearance: He is obese. He is ill-appearing.    HENT:      Head: Normocephalic.      Mouth/Throat: no erythema, no confestion     Mouth: Mucous membranes are moist.      Neck: thrombectomy incision c/d/i, no hematoma, dressing in place   Eyes:      Pupils: Pupils are equal, round, and reactive to light.   Neck:      Musculoskeletal: Normal range of motion.   Cardiovascular:      Rate and Rhythm: Normal rate and regular rhythm.      Heart sounds: Normal heart sounds.   Pulmonary:      Effort: Pulmonary effort is normal.      Breath sounds: faint crackles auscultated in lower lung bases bilaterally  Abdominal:      General: Abdomen is flat. Bowel sounds are normal.      Tenderness: There is no abdominal tenderness.   Musculoskeletal: Normal range of motion.   Skin:     General: Skin is warm.   Neurological:      General: No focal deficit present.      Mental Status: He is alert and oriented to person, place, and time.     Fluids    Intake/Output Summary (Last 24 hours) at 1/31/2021 1234  Last data filed at 1/31/2021 0224  Gross per 24 hour   Intake 480 ml   Output --   Net 480 ml       Laboratory  Recent Labs     01/29/21  0928 01/30/21  0301 01/31/21  0120   WBC 11.6* 13.9* 15.4*   RBC 4.62* 4.92 5.30   HEMOGLOBIN 12.4* 13.8* 14.5   HEMATOCRIT 40.2* 42.6 45.3   MCV 87.0 86.6 85.5   MCH 26.8* 28.0 27.4   MCHC 30.8* 32.4* 32.0*   RDW 44.5 43.9 43.5   PLATELETCT 844* 908* 1032*   MPV 10.0 9.5 10.0     Recent Labs     01/29/21  0928 01/30/21  0301 01/31/21  0120   SODIUM 130* 133* 135   POTASSIUM 4.1 4.1 3.8   CHLORIDE 97 99 98   CO2 24 25 23   GLUCOSE 205* 98 120*   BUN 13 15 16   CREATININE 0.62 0.70 0.74   CALCIUM 8.3* 9.0 8.7     Recent Labs     01/29/21  1151 01/30/21  0932 01/31/21  0120   APTT 29.3  --   --    INR 1.02 1.42* 1.81*               Imaging  MR-BRAIN-W/O   Final Result      Areas of ischemia in the RIGHT frontal, parietal and occipital cortex, suspect watershed injury      EC-ECHOCARDIOGRAM LTD W/O CONT   Final Result      DX-PORTABLE FLUOROSCOPY  < 1 HOUR   Final Result      Portable fluoroscopy utilized for 15 seconds.         INTERPRETING LOCATION: [address], [place], [address]      CT-CTA HEAD WITH & W/O-POST PROCESS   Final Result         1.  No large vessel occlusion or aneurysm appreciated.      CT-CTA NECK WITH & W/O-POST PROCESSING   Final Result         1.  Low-density filling defect in the distal right common carotid artery, appearance most compatible with thrombus adherent to the wall, results in approximately 65% stenosis.   2.  Peripheral reticular opacities in the bilateral lung apices, appearance favors Covid infiltrates.      These findings were discussed with the patient's clinician, Jeremy Townsend, on 1/26/2021 10:20 PM.         DX-CHEST-PORTABLE (1 VIEW)   Final Result      Multifocal bilateral pneumonia.           Assessment/Plan  * Acute hypoxemic respiratory failure due to COVID-19 ([facility])- (present on admission)  Assessment & Plan  COVID positive 1/17/21  Contact, droplet, eye precaution  Oxygen per protocol  Decadron day 9/10  Lasix 20mg daily  Trend inflammatory markers  4L nasal cannula    Focal infarction of brain ([facility])  Assessment & Plan  MRI brain demonstrating several infarcts in the right anterior circulation   Consistent with territory affected by right common carotid artery thrombus  S/p thrombectomy  No deficits at this time. No upper extremity weakness, speech in tact.  Bridge to coumadin  Follow up with neurology as out patient     Thrombosis of right common carotid artery  Assessment & Plan  As identified on CTA neck: Low-density filling defect in the distal right common carotid artery, appearance most compatible with thrombus adherent to the wall, results in approximately 65% stenosis.    - Heparin infusion  - s/p thrombectomy  - bridge to coumadin per vascular  - 2d echo: no thrombus noted, EF 75%  - MRI brain demonstrates several infarcts in right anterior circulation consistent with right common carotid distribution  -  bridge to coumadin INR 1.8    Elevated glucose- (present on admission)  Assessment & Plan  Hemoglobin A1c 7.5  Newly diagnosed DM  Diet consistent carbohydrates  POCT glucose  Sliding scale insulin  Diabetic education    HTN (hypertension)- (present on admission)  Assessment & Plan  Controlled  Continue home losartan.    Thrombocytosis (HCC)  Assessment & Plan  Up trending platelets   PLT 1032  Likely reactive  Will monitor     VTE prophylaxis: Eliquis

## 2021-01-31 NOTE — PROGRESS NOTES
Inpatient Anticoagulation Service Note    Date: 1/31/2021    Reason for Anticoagulation: Other (Comments)(Arterial thrombosis)   Target INR: 2.0 to 2.5(Per MD )         Hemoglobin Value: 14.5  Hematocrit Value: 45.3  Lab Platelet Value: (!) 1032(This result has been called to KATHERYN [id number] by Spencer Rodriguez on 01 31 2021 at  0248, and has been read back.  )    INR from last 7 days     Date/Time INR Value    01/31/21 0120  (!) 1.81    01/30/21 0932  (!) 1.42    01/29/21 1151  1.02    01/27/21 0802  1.07        Dose from last 7 days     Date/Time Dose (mg)    01/31/21 0700  4    01/30/21 0900  4    01/29/21 1400  5          Significant Interactions: Antibiotics, Corticosteroids  Bridge Therapy: Yes  Date of Last VTE Event: 01/26/21  Bridge Therapy Start Date: 01/29/21  Days of Overlap Therapy: 2   INR Value Greater than 2 Prior to Discontinuation of Parenteral Anticoagulation: Not Applicable     Comments:  44 y/o M w/ PMHx HTN, BPH, obesity, positive COVID 19 infection (1/17/21) . CTA neck low-density filling defect in the distal right common carotid artery, appearance most compatible with thrombus adherent to the wall, results in approximately 65% stenosis. DDI noted. No documented signs of overt bleeding. INR below goal.      Plan:  Warfarin 4mg po daily , INR in AM.   Education Material Provided?: No (New start warfarin)  Pharmacist suggested discharge dosing: Warfarin 4-5 mg po daily , follow up with anticoagulation clinic after discharge.      Reymundo Constantino, PharmD, BCPS

## 2021-02-01 VITALS
TEMPERATURE: 96.9 F | WEIGHT: 189.6 LBS | RESPIRATION RATE: 20 BRPM | HEART RATE: 94 BPM | BODY MASS INDEX: 30.47 KG/M2 | DIASTOLIC BLOOD PRESSURE: 100 MMHG | OXYGEN SATURATION: 95 % | SYSTOLIC BLOOD PRESSURE: 139 MMHG | HEIGHT: 66 IN

## 2021-02-01 LAB
ALBUMIN SERPL BCP-MCNC: 3.5 G/DL (ref 3.2–4.9)
BASOPHILS # BLD AUTO: 0.2 % (ref 0–1.8)
BASOPHILS # BLD: 0.03 K/UL (ref 0–0.12)
BUN SERPL-MCNC: 10 MG/DL (ref 8–22)
CALCIUM SERPL-MCNC: 8.7 MG/DL (ref 8.5–10.5)
CHLORIDE SERPL-SCNC: 104 MMOL/L (ref 96–112)
CO2 SERPL-SCNC: 22 MMOL/L (ref 20–33)
CREAT SERPL-MCNC: 0.6 MG/DL (ref 0.5–1.4)
EOSINOPHIL # BLD AUTO: 0.17 K/UL (ref 0–0.51)
EOSINOPHIL NFR BLD: 1.2 % (ref 0–6.9)
ERYTHROCYTE [DISTWIDTH] IN BLOOD BY AUTOMATED COUNT: 43.7 FL (ref 35.9–50)
FERRITIN SERPL-MCNC: 668 NG/ML (ref 22–322)
GLUCOSE SERPL-MCNC: 131 MG/DL (ref 65–99)
HCT VFR BLD AUTO: 40.1 % (ref 42–52)
HGB BLD-MCNC: 12.8 G/DL (ref 14–18)
IMM GRANULOCYTES # BLD AUTO: 0.28 K/UL (ref 0–0.11)
IMM GRANULOCYTES NFR BLD AUTO: 2 % (ref 0–0.9)
INR PPP: 2.87 (ref 0.87–1.13)
IRON SATN MFR SERPL: 34 % (ref 15–55)
IRON SERPL-MCNC: 85 UG/DL (ref 50–180)
LDH SERPL L TO P-CCNC: 204 U/L (ref 107–266)
LYMPHOCYTES # BLD AUTO: 2.87 K/UL (ref 1–4.8)
LYMPHOCYTES NFR BLD: 20.5 % (ref 22–41)
MAGNESIUM SERPL-MCNC: 1.8 MG/DL (ref 1.5–2.5)
MCH RBC QN AUTO: 27.6 PG (ref 27–33)
MCHC RBC AUTO-ENTMCNC: 31.9 G/DL (ref 33.7–35.3)
MCV RBC AUTO: 86.4 FL (ref 81.4–97.8)
MONOCYTES # BLD AUTO: 0.8 K/UL (ref 0–0.85)
MONOCYTES NFR BLD AUTO: 5.7 % (ref 0–13.4)
NEUTROPHILS # BLD AUTO: 9.82 K/UL (ref 1.82–7.42)
NEUTROPHILS NFR BLD: 70.4 % (ref 44–72)
NRBC # BLD AUTO: 0 K/UL
NRBC BLD-RTO: 0 /100 WBC
PHOSPHATE SERPL-MCNC: 3.8 MG/DL (ref 2.5–4.5)
PLATELET # BLD AUTO: 933 K/UL (ref 164–446)
PMV BLD AUTO: 9.8 FL (ref 9–12.9)
POTASSIUM SERPL-SCNC: 3.6 MMOL/L (ref 3.6–5.5)
PROTHROMBIN TIME: 30.9 SEC (ref 12–14.6)
RBC # BLD AUTO: 4.64 M/UL (ref 4.7–6.1)
SODIUM SERPL-SCNC: 137 MMOL/L (ref 135–145)
TIBC SERPL-MCNC: 251 UG/DL (ref 250–450)
UFH PPP CHRO-ACNC: 0.53 IU/ML
UIBC SERPL-MCNC: 166 UG/DL (ref 110–370)
URATE SERPL-MCNC: 4.2 MG/DL (ref 2.5–8.3)
WBC # BLD AUTO: 14 K/UL (ref 4.8–10.8)

## 2021-02-01 PROCEDURE — 85025 COMPLETE CBC W/AUTO DIFF WBC: CPT

## 2021-02-01 PROCEDURE — 3E02340 INTRODUCTION OF INFLUENZA VACCINE INTO MUSCLE, PERCUTANEOUS APPROACH: ICD-10-PCS | Performed by: STUDENT IN AN ORGANIZED HEALTH CARE EDUCATION/TRAINING PROGRAM

## 2021-02-01 PROCEDURE — 700102 HCHG RX REV CODE 250 W/ 637 OVERRIDE(OP): Performed by: STUDENT IN AN ORGANIZED HEALTH CARE EDUCATION/TRAINING PROGRAM

## 2021-02-01 PROCEDURE — A9270 NON-COVERED ITEM OR SERVICE: HCPCS | Performed by: STUDENT IN AN ORGANIZED HEALTH CARE EDUCATION/TRAINING PROGRAM

## 2021-02-01 PROCEDURE — A9270 NON-COVERED ITEM OR SERVICE: HCPCS | Performed by: SURGERY

## 2021-02-01 PROCEDURE — 83540 ASSAY OF IRON: CPT

## 2021-02-01 PROCEDURE — 80069 RENAL FUNCTION PANEL: CPT

## 2021-02-01 PROCEDURE — 36415 COLL VENOUS BLD VENIPUNCTURE: CPT

## 2021-02-01 PROCEDURE — 82728 ASSAY OF FERRITIN: CPT

## 2021-02-01 PROCEDURE — 90686 IIV4 VACC NO PRSV 0.5 ML IM: CPT | Performed by: STUDENT IN AN ORGANIZED HEALTH CARE EDUCATION/TRAINING PROGRAM

## 2021-02-01 PROCEDURE — 83550 IRON BINDING TEST: CPT

## 2021-02-01 PROCEDURE — 83735 ASSAY OF MAGNESIUM: CPT

## 2021-02-01 PROCEDURE — 85520 HEPARIN ASSAY: CPT

## 2021-02-01 PROCEDURE — 99239 HOSP IP/OBS DSCHRG MGMT >30: CPT | Performed by: INTERNAL MEDICINE

## 2021-02-01 PROCEDURE — 83615 LACTATE (LD) (LDH) ENZYME: CPT

## 2021-02-01 PROCEDURE — 85610 PROTHROMBIN TIME: CPT

## 2021-02-01 PROCEDURE — 700102 HCHG RX REV CODE 250 W/ 637 OVERRIDE(OP): Performed by: SURGERY

## 2021-02-01 PROCEDURE — 700111 HCHG RX REV CODE 636 W/ 250 OVERRIDE (IP): Performed by: STUDENT IN AN ORGANIZED HEALTH CARE EDUCATION/TRAINING PROGRAM

## 2021-02-01 PROCEDURE — 84550 ASSAY OF BLOOD/URIC ACID: CPT

## 2021-02-01 PROCEDURE — 90471 IMMUNIZATION ADMIN: CPT

## 2021-02-01 RX ORDER — BENZONATATE 100 MG/1
200 CAPSULE ORAL 3 TIMES DAILY PRN
Qty: 60 CAP | Refills: 0 | Status: SHIPPED | OUTPATIENT
Start: 2021-02-01 | End: 2022-01-24

## 2021-02-01 RX ORDER — WARFARIN SODIUM 4 MG/1
4 TABLET ORAL DAILY
Qty: 30 TAB | Refills: 3 | Status: SHIPPED | OUTPATIENT
Start: 2021-02-01 | End: 2021-05-21 | Stop reason: SDUPTHER

## 2021-02-01 RX ORDER — GUAIFENESIN 600 MG/1
600 TABLET, EXTENDED RELEASE ORAL 2 TIMES DAILY PRN
Qty: 28 TAB | Refills: 0 | Status: SHIPPED | OUTPATIENT
Start: 2021-02-01 | End: 2022-01-24

## 2021-02-01 RX ORDER — ATORVASTATIN CALCIUM 80 MG/1
80 TABLET, FILM COATED ORAL EVERY EVENING
Qty: 30 TAB | Refills: 3 | Status: SHIPPED | OUTPATIENT
Start: 2021-02-01

## 2021-02-01 RX ORDER — TAMSULOSIN HYDROCHLORIDE 0.4 MG/1
0.4 CAPSULE ORAL
Qty: 30 CAP | Refills: 0 | Status: SHIPPED | OUTPATIENT
Start: 2021-02-02

## 2021-02-01 RX ORDER — ASPIRIN 81 MG/1
81 TABLET ORAL DAILY
Qty: 30 TAB | Refills: 3 | Status: SHIPPED | OUTPATIENT
Start: 2021-02-02 | End: 2021-07-09

## 2021-02-01 RX ADMIN — THERA TABS 1 TABLET: TAB at 06:00

## 2021-02-01 RX ADMIN — TAMSULOSIN HYDROCHLORIDE 0.4 MG: 0.4 CAPSULE ORAL at 09:07

## 2021-02-01 RX ADMIN — INFLUENZA A VIRUS A/GUANGDONG-MAONAN/SWL1536/2019 CNIC-1909 (H1N1) ANTIGEN (FORMALDEHYDE INACTIVATED), INFLUENZA A VIRUS A/HONG KONG/2671/2019 (H3N2) ANTIGEN (FORMALDEHYDE INACTIVATED), INFLUENZA B VIRUS B/PHUKET/3073/2013 ANTIGEN (FORMALDEHYDE INACTIVATED), AND INFLUENZA B VIRUS B/WASHINGTON/02/2019 ANTIGEN (FORMALDEHYDE INACTIVATED) 0.5 ML: 15; 15; 15; 15 INJECTION, SUSPENSION INTRAMUSCULAR at 09:12

## 2021-02-01 RX ADMIN — ASPIRIN 81 MG: 81 TABLET, COATED ORAL at 06:00

## 2021-02-01 RX ADMIN — DOCUSATE SODIUM 50 MG AND SENNOSIDES 8.6 MG 2 TABLET: 8.6; 5 TABLET, FILM COATED ORAL at 06:00

## 2021-02-01 ASSESSMENT — PAIN DESCRIPTION - PAIN TYPE: TYPE: ACUTE PAIN

## 2021-02-01 ASSESSMENT — PATIENT HEALTH QUESTIONNAIRE - PHQ9
SUM OF ALL RESPONSES TO PHQ9 QUESTIONS 1 AND 2: 0
1. LITTLE INTEREST OR PLEASURE IN DOING THINGS: NOT AT ALL

## 2021-02-01 NOTE — PROGRESS NOTES
Assumed care. Report received from day tour @1900. Pt alert and oriented x3. no signs of respiratory distress, remains on 4 liters nc. Denies pain, call light within reach. Bed in low position. Able to make needs known. Hourly rounding in place. Will monitor.

## 2021-02-01 NOTE — PROGRESS NOTES
1031-pt discharging home with family via private vehicle, flex nurse and CNA taking pt and oxygen equip. Down, pt in w/c. IV D/C cath in tact bleeding controlled with coban and 2x2s. No s/s of distress.     1032-left unit

## 2021-02-01 NOTE — CARE PLAN
Problem: Communication  Goal: The ability to communicate needs accurately and effectively will improve  Outcome: PROGRESSING AS EXPECTED     Problem: Safety  Goal: Will remain free from injury  Outcome: PROGRESSING AS EXPECTED  Goal: Will remain free from falls  Outcome: PROGRESSING AS EXPECTED     Problem: Venous Thromboembolism (VTW)/Deep Vein Thrombosis (DVT) Prevention:  Goal: Patient will participate in Venous Thrombosis (VTE)/Deep Vein Thrombosis (DVT)Prevention Measures  Outcome: PROGRESSING SLOWER THAN EXPECTED

## 2021-02-01 NOTE — PROGRESS NOTES
Assumed care of patient, bedside report received from KATHERYN Churchill. Updated on POC, call light within reach and fall precautions in place. Bed locked and in lowest position. Patient instructed to call for assistance before getting out of bed. All questions answered, no other needs at this time.

## 2021-02-01 NOTE — DISCHARGE SUMMARY
Discharge Summary    CHIEF COMPLAINT ON ADMISSION  Chief Complaint   Patient presents with   • Cough   • Shortness of Breath       Reason for Admission  ems     Admission Date  1/22/2021    CODE STATUS  Full Code    HPI & HOSPITAL COURSE  45-year-old male currently admitted with Covid infection who on 01/22/2021. Patient was placed on steroids and oxygen. On 1/26/2021 had neurological changes consisting of left upper extremity numbness and weakness.  A CTA of the head and neck was obtained showing a right common carotid artery thrombus with 65% stenosis.  The patient was not a TPA candidate.  Vascular surgery was consulted; the patient underwent a carotid endarterectomy with thrombectomy and subsequently was on a heparin drip and bridged to coumadin. 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  Overall, these infarcts are consistent with the vascular territory supplied by the carotid artery and are likely secondary to watershed injury from thrombus in the carotid. Patient had no lasting neurologic deficits. Patients labs demonstrated thrombocytosis which was likely reactive in nature. Recommend follow up CBC in 1 week after establishing care with PCP. Patient was instructed to have follow up with stroke clinic, anticoagulation clinic, vascular surgery and establish care with PCP. Patient was discharged home with home O2.     Therefore, he is discharged in fair and stable condition to home with close outpatient follow-up.    The patient met 2-midnight criteria for an inpatient stay at the time of discharge.    Discharge Date  02/01/2021    FOLLOW UP ITEMS POST DISCHARGE  Please follow up with PCP in 3-5 days for post hospitalization follow up and medication reconciliation.   Follow up with anticoagulation clinic for coumadin dosing for arterial thrombosis.   Follow up with vascular surgery for suture removal and post surgical follow up.      DISCHARGE DIAGNOSES  Principal Problem:    Acute hypoxemic respiratory failure due to COVID-19 (HCC) POA: Yes  Active Problems:    Thrombosis of right common carotid artery POA: Unknown    Focal infarction of brain (HCC) POA: Unknown    HTN (hypertension) POA: Yes    Elevated glucose POA: Yes    Thrombocytosis (HCC) POA: Unknown  Resolved Problems:    Numbness and tingling of right leg POA: Yes    Obesity (BMI 30.0-34.9) POA: Yes      FOLLOW UP  No future appointments.  [address] [id number] [phone]  Schedule an appointment as soon as possible for a visit  To establish with a primary care doctor. Post hospitalization follow up, medication reconciliation, repeat CBC, CMP    Joelle Bella M.D.  [address] [facility] [address] [phone]    In 3 days  s/p thrombectomy 01/27, For suture removal      MEDICATIONS ON DISCHARGE     Medication List      START taking these medications      Instructions   aspirin 81 MG EC tablet  Start taking on: February 2, 2021   Take 1 Tab by mouth every day.  Dose: 81 mg     atorvastatin 80 MG tablet  Commonly known as: LIPITOR   Take 1 Tab by mouth every evening.  Dose: 80 mg     guaiFENesin  MG Tb12  Commonly known as: MUCINEX   Take 1 Tab by mouth 2 times a day as needed for Cough (productive cough).  Dose: 600 mg     warfarin 4 MG Tabs  Commonly known as: COUMADIN   Take 1 Tab by mouth every day at 6 PM.  Dose: 4 mg        CHANGE how you take these medications      Instructions   tamsulosin 0.4 MG capsule  Start taking on: February 2, 2021  What changed: when to take this  Commonly known as: FLOMAX   Take 1 Cap by mouth 1/2 hour after breakfast.  Dose: 0.4 mg        CONTINUE taking these medications      Instructions   acetaminophen 500 MG Tabs  Commonly known as: TYLENOL   Take 1,000 mg by mouth one time as needed (pain).  Dose: 1,000 mg     benzonatate 100 MG Caps  Commonly known as: TESSALON   Take 2 Caps by  mouth 3 times a day as needed for Cough.  Dose: 200 mg     MULTIVITAMINS PO   Take 3 Tabs by mouth every day.  Dose: 3 Tab        STOP taking these medications    losartan 50 MG Tabs  Commonly known as: COZAAR     ondansetron 4 MG Tbdp  Commonly known as: ZOFRAN ODT     oxybutynin 5 MG Tabs  Commonly known as: DITROPAN            Allergies  No Known Allergies    DIET  Orders Placed This Encounter   Procedures   • Diet Order Diet: Regular     Standing Status:   Standing     Number of Occurrences:   1     Order Specific Question:   Diet:     Answer:   Regular [1]   • Discontinue Diet Tray     Standing Status:   Standing     Number of Occurrences:   1       ACTIVITY  As tolerated.  Weight bearing as tolerated    CONSULTATIONS  Vascular surgery  Neurology    PROCEDURES  Thrombectomy    LABORATORY  Lab Results   Component Value Date    SODIUM 137 02/01/2021    POTASSIUM 3.6 02/01/2021    CHLORIDE 104 02/01/2021    CO2 22 02/01/2021    GLUCOSE 131 (H) 02/01/2021    BUN 10 02/01/2021    CREATININE 0.60 02/01/2021        Lab Results   Component Value Date    WBC 14.0 (H) 02/01/2021    HEMOGLOBIN 12.8 (L) 02/01/2021    HEMATOCRIT 40.1 (L) 02/01/2021    PLATELETCT 933 (H) 02/01/2021        Total time of the discharge process exceeds 37 minutes.

## 2021-02-01 NOTE — DISCHARGE INSTRUCTIONS
Bleeding Precautions When on Anticoagulant Therapy, Adult  Anticoagulant therapy, also called blood thinner therapy, is medicine that helps to prevent and treat blood clots. The medicine works by stopping blood clots from forming or growing. Blood clots that form in your blood vessels can be dangerous. They can break loose and travel to the heart, lungs, or brain. This increases the risk of a heart attack, stroke, or blocked lung artery (pulmonary embolism).  Anticoagulants also increase the risk of bleeding. Try to protect yourself from cuts and other injuries that can cause bleeding. It is important to take anticoagulants exactly as told by your health care provider.  Why do I need to be on anticoagulant therapy?  You may need this medicine if you are at risk of developing a blood clot. Conditions that increase your risk of a blood clot include:  · Being born with heart disease or a heart malformation (congenital heart disease).  · Developing heart disease.  · Having had surgery, such as valve replacement.  · Having had a serious accident or other type of severe injury (trauma).  · Having certain types of cancer.  · Having certain diseases that can increase blood clotting.  · Having a high risk of stroke or heart attack.  · Having atrial fibrillation (AF).  What are the common anticoagulant medicines?  There are several types of anticoagulant medicines. The most common types are:  · Medicines that you take by mouth (oral medicines), such as:  ? Warfarin.  ? Novel oral anticoagulants (NOACs), such as:  ? Direct thrombin inhibitors (dabigatran).  ? Factor Xa inhibitors (apixaban, edoxaban, and rivaroxaban).  · Injections, such as:  ? Unfractionated heparin.  ? Low molecular weight heparin.  These anticoagulants work in different ways to prevent blood clots. They also have different risks and side effects.  What do I need to remember while on anticoagulant therapy?  Taking anticoagulants  · Take your medicine at  the same time every day. If you forget to take your medicine, take it as soon as you remember. Do not double your dosage of medicine if you miss a whole day. Take your normal dose and call your health care provider.  · Do not stop taking your medicine unless your health care provider approves. Stopping the medicine can increase your risk of developing a blood clot.  Taking other medicines  · Take over-the-counter and prescriptions medicines only as told by your health care provider.  · Do not take over-the-counter NSAIDs, including aspirin and ibuprofen, while you are on anticoagulant therapy. These medicines increase your risk of dangerous bleeding.  · Get approval from your health care provider before you start taking any new medicines, vitamins, or herbal products. Some of these could interfere with your therapy.  General instructions  · Keep all follow-up visits as told by your health care provider. This is important.  · If you are pregnant or trying to get pregnant, talk with a health care provider about anticoagulants. Some of these medicines are not safe to take during pregnancy.  · Tell all health care providers, including your dentist, that you are on anticoagulant therapy. It is especially important to tell providers before you have any surgery, medical procedures, or dental work done.  What precautions should I take?    · Be very careful when using knives, scissors, or other sharp objects.  · Use an electric razor instead of a blade.  · Do not use toothpicks.  · Use a soft-bristled toothbrush. Brush your teeth gently.  · Always wear shoes outdoors and wear slippers indoors.  · Be careful when cutting your fingernails and toenails.  · Place bath mats in the bathroom. If possible, install handrails as well.  · Wear gloves while you do yard work.  · Wear your seat belt.  · Prevent falls by removing loose rugs and extension cords from areas where you walk. Use a cane or walker if you need it.  · Avoid  constipation by:  ? Drinking enough fluid to keep your urine clear or pale yellow.  ? Eating foods that are high in fiber, such as fresh fruits and vegetables, whole grains, and beans.  ? Limiting foods that are high in fat and processed sugars, such as fried and sweet foods.  · Do not play contact sports or participate in other activities that have a high risk for injury.  What other precautions are important if on warfarin therapy?  If you are taking a type of anticoagulant called warfarin, make sure you:  · Work with a diet and nutrition specialist (dietitian) to make an eating plan. Do not make any sudden changes to your diet after you have started your eating plan.  · Do not drink alcohol. It can interfere with your medicine and increase your risk of an injury that causes bleeding.  · Get regular blood tests as told by your health care provider.  What are some questions to ask my health care provider?  · Why do I need anticoagulant therapy?  · What is the best anticoagulant therapy for my condition?  · How long will I need anticoagulant therapy?  · What are the side effects of anticoagulant therapy?  · When should I take my medicine? What should I do if I forget to take it?  · Will I need to have regular blood tests?  · Do I need to change my diet? Are there foods or drinks that I should avoid?  · What activities are safe for me?  · What should I do if I want to get pregnant?  Contact a health care provider if:  · You miss a dose of medicine:  ? And you are not sure what to do.  ? For more than one day.  · You have:  ? Menstrual bleeding that is heavier than normal.  ? Bloody or brown urine.  ? Easy bruising.  ? Black and tarry stool or bright red stool.  ? Side effects from your medicine.  · You feel weak or dizzy.  · You become pregnant.  Get help right away if:  · You have bleeding that will not stop within 20 minutes from:  ? The nose.  ? The gums.  ? A cut on the skin.  · You have a severe headache or  stomachache.  · You vomit or cough up blood.  · You fall or hit your head.  Summary  · Anticoagulant therapy, also called blood thinner therapy, is medicine that helps to prevent and treat blood clots.  · Anticoagulants work in different ways to prevent blood clots. They also have different risks and side effects.  · Talk with your health care provider about any precautions that you should take while on anticoagulant therapy.  This information is not intended to replace advice given to you by your health care provider. Make sure you discuss any questions you have with your health care provider.  Document Released: 11/28/2016 Document Revised: 04/08/2020 Document Reviewed: 03/06/2018  [employer] Patient Education © 2020 [employer]  Discharge Instructions    Discharged to home by car with relative. Discharged via wheelchair, hospital escort: Yes.  Special equipment needed: Oxygen    Be sure to schedule a follow-up appointment with your primary care doctor or any specialists as instructed.     Discharge Plan:   Diet Plan: Discussed  Activity Level: Discussed  Confirmed Follow up Appointment: Patient to Call and Schedule Appointment  Confirmed Symptoms Management: Discussed  Medication Reconciliation Updated: Yes  Influenza Vaccine Indication: Not indicated: Previously immunized this influenza season and > 8 years of age    I understand that a diet low in cholesterol, fat, and sodium is recommended for good health. Unless I have been given specific instructions below for another diet, I accept this instruction as my diet prescription.   Other diet: regular    Special Instructions:   Deep Vein Thrombosis Discharge Instructions    A deep vein thrombosis (DVT) is a blood clot (thrombus) that develops in a deep vein. A DVT is a clot in the deep, larger veins of the leg, arm, or pelvis. These are more dangerous than clots that might form in veins on the surface of the body. Deep vein thrombosis can lead to complications if  the clot breaks off and travels in the bloodstream to the lungs.     CAUSES  Blood clots form in a vein for different reasons. Usually several things cause blood clots. They include:   · The flow of blood slows down.   · The inside of the vein is damaged in some way.   · The person has a condition that makes blood clot more easily. These conditions may include:  · Older age (especially over 75 years old).  · Having a history of blood clots.  · Having major or lengthy surgery. Hip surgery is particularly high-risk.   · Breaking a hip or leg.  · Sitting or lying still for a long time.  · Cancer or cancer treatment.  · Having a long, thin tube (catheter) placed inside a vein during a medical procedure.   · Being overweight (obese).  · Pregnancy and childbirth.  · Medicines with estrogen.  · Smoking.  · Other circulation or heart problems.     SYMPTOMS  When a clot forms, it can either partially or totally block the blood flow in that vein. Symptoms of a DVT can include:  · Swelling of the leg or arm, especially if one side is much worse.  · Warmth and redness of the leg or arm, especially if one side is much worse.   · Pain in an arm or leg. If the clot is in the leg, symptoms may be more noticeable or worse when standing or walking.  If the blood clot travels to the lung, it may cause:  · Shortness of breath.  · Chest pain. The pain may be worsened by deep breaths.   · Coughing up thick mucus (phlegm), possibly flecked with blood.   Anyone with these symptoms should get emergency medical treatment right away. Call your local emergency  Services (911 in [place] if you have these symptoms.     DIAGNOSIS  If a DVT is suspected, your caregiver will take a full medical history. He or she will also perform a physical exam. Tests that also may be required include:   · Studies of the clotting properties of the blood.   · An ultrasound scan.   · X-rays to show the flow of blood when special dye is injected into the veins  (venography).   · Studies of your lungs if you have any chest symptoms.     PREVENTION  · Exercise the legs regularly. Take a brisk 30 minute walk every day.   · Maintain a weight that is appropriate for your height.  · Avoid sitting or lying in bed for long periods of time without moving your legs.   · Women, particularly those over the age of 35, should consider the risks and benefits of taking estrogen medicine, including birth control pills.   · Do not smoke, especially if you take estrogen medicines.   · Long-distance travel can increase your risk. You should exercise your legs by walking or pumping the muscles every hour.   · In hospital prevention: Prevention may include medical and non medical measures.     TREATMENT  · The most common treatment for DVT is blood thinning (anticoagulant) medicine, which reduces the blood's tendency to clot. Anticoagulants can stop new blood clots from forming and old ones from growing. They cannot dissolve existing clots. Your body does this by itself over time. Anticoagulants can be given by mouth, by intravenous (IV) access, or by injection. Your caregiver will determine the best program for you.   · Less commonly, clot-dissolving drugs (thrombolytics) are used to dissolve a DVT. They carry a high risk of bleeding, so they are used mainly in severe cases.   · Very rarely, a blood clot in the leg needs to be removed surgically.   · If you are unable to take anticoagulants, your caregiver may arrange for you to have a filter placed in a main vein in your belly (abdomen). This filter prevents clots from traveling to your lungs.     HOME CARE INSTRUCTIONS  Take all medicines prescribed by your caregiver. Follow the directions carefully.   · You will most likely continue taking anticoagulants after you leave the hospital. Your caregiver will advise you on the length of treatment (usually 3 to 5 months, sometimes for life).   · Taking too much or too little of an anticoagulant is  dangerous. While taking this type of medicine, you will need to have regular blood tests to be sure the dose is correct. The dose can change for many reasons. It is critically important that you take this medicine exactly as prescribed, and that you have blood tests exactly as directed.   · Many foods can interfere with anticoagulants. These include foods high in vitamin K, such as spinach, kale, broccoli, cabbage, [place] and turnip greens, [place] sprouts, peas, cauliflower, seaweed, parsley, beef and pork liver, green tea, and soybean oil. Your caregiver should discuss limits on these foods with you or you should arrange a visit with a dietician to answer your questions.   · Many medicines can interfere with anticoagulants. You must tell your caregiver about any and all medicines you take. This includes all vitamins and supplements. Be especially cautious with aspirin and anti-inflammatory medicines. Ask your caregiver before taking these.   · Anticoagulants can have side effects, mostly excessive bruising or bleeding. You will need to hold pressure over cuts for longer than usual. Avoid alcoholic drinks or consume only very small amounts while taking this medicine.    If you are taking an anticoagulant:  · Wear a medical alert bracelet.  · Notify your dentist or other caregivers before procedures.  · Avoid contact sports.    · Ask your caregiver how soon you can go back to normal activities. Not being active can lead to new clots. Ask for a list of what you should and should not do.   · Exercise your lower leg muscles. This is important while traveling.   · You may need to wear compression stockings. These are tight elastic stockings that apply pressure to the lower legs. This can help keep the blood in the legs from clotting.   · If you are a smoker, you should quit.   · Learn as much as you can about DVT.     SEEK MEDICAL CARE IF:  · You have unusual bruising or any bleeding problems.  · The swelling or pain  in your affected arm or leg is not gradually improving.   · You anticipate surgery or long-distance travel. You should get specific advice on DVT prevention.   · You discover other family members with blood clots. This may require further testing for inherited diseases or conditions.     SEEK IMMEDIATE MEDICAL CARE IF:  · You develop chest pain.  · You develop severe shortness of breath.  · You begin to cough up bloody mucus or phlegm (sputum).  · You feel dizzy or faint.   · You develop swelling or pain in the leg.  · You have breathing problems after traveling.    MAKE SURE YOU:  · Understand these instructions.  · Will watch your condition.  · Will get help right away if you are not doing well or get worse.       · Is patient discharged on Warfarin / Coumadin?   Yes    You are receiving the drug warfarin. Please understand the importance of monitoring warfarin with scheduled PT/INR blood draws.  Follow-up with a call to your personal Doctor's office in 3 days to schedule a PT/INR. .    IMPORTANT: HOW TO USE THIS INFORMATION:  This is a summary and does NOT have all possible information about this product. This information does not assure that this product is safe, effective, or appropriate for you. This information is not individual medical advice and does not substitute for the advice of your health care professional. Always ask your health care professional for complete information about this product and your specific health needs.      WARFARIN - ORAL (WARF-uh-rin)      COMMON BRAND NAME(S): Coumadin      WARNING:  Warfarin can cause very serious (possibly fatal) bleeding. This is more likely to occur when you first start taking this medication or if you take too much warfarin. To decrease your risk for bleeding, your doctor or other health care provider will monitor you closely and check your lab results (INR test) to make sure you are not taking too much warfarin. Keep all medical and laboratory  "appointments. Tell your doctor right away if you notice any signs of serious bleeding. See also Side Effects section.      USES:  This medication is used to treat blood clots (such as in deep vein thrombosis-DVT or pulmonary embolus-PE) and/or to prevent new clots from forming in your body. Preventing harmful blood clots helps to reduce the risk of a stroke or heart attack. Conditions that increase your risk of developing blood clots include a certain type of irregular heart rhythm (atrial fibrillation), heart valve replacement, recent heart attack, and certain surgeries (such as hip/knee replacement). Warfarin is commonly called a \"blood thinner,\" but the more correct term is \"anticoagulant.\" It helps to keep blood flowing smoothly in your body by decreasing the amount of certain substances (clotting proteins) in your blood.      HOW TO USE:  Read the Medication Guide provided by your pharmacist before you start taking warfarin and each time you get a refill. If you have any questions, ask your doctor or pharmacist. Take this medication by mouth with or without food as directed by your doctor or other health care professional, usually once a day. It is very important to take it exactly as directed. Do not increase the dose, take it more frequently, or stop using it unless directed by your doctor. Dosage is based on your medical condition, laboratory tests (such as INR), and response to treatment. Your doctor or other health care provider will monitor you closely while you are taking this medication to determine the right dose for you. Use this medication regularly to get the most benefit from it. To help you remember, take it at the same time each day. It is important to eat a balanced, consistent diet while taking warfarin. Some foods can affect how warfarin works in your body and may affect your treatment and dose. Avoid sudden large increases or decreases in your intake of foods high in vitamin K (such as " broccoli, cauliflower, cabbage, brussels sprouts, kale, spinach, and other green leafy vegetables, liver, green tea, certain vitamin supplements). If you are trying to lose weight, check with your doctor before you try to go on a diet. Cranberry products may also affect how your warfarin works. Limit the amount of cranberry juice (16 ounces/480 milliliters a day) or other cranberry products you may drink or eat.      SIDE EFFECTS:  Nausea, loss of appetite, or stomach/abdominal pain may occur. If any of these effects persist or worsen, tell your doctor or pharmacist promptly. Remember that your doctor has prescribed this medication because he or she has judged that the benefit to you is greater than the risk of side effects. Many people using this medication do not have serious side effects. This medication can cause serious bleeding if it affects your blood clotting proteins too much (shown by unusually high INR lab results). Even if your doctor stops your medication, this risk of bleeding can continue for up to a week. Tell your doctor right away if you have any signs of serious bleeding, including: unusual pain/swelling/discomfort, unusual/easy bruising, prolonged bleeding from cuts or gums, persistent/frequent nosebleeds, unusually heavy/prolonged menstrual flow, pink/dark urine, coughing up blood, vomit that is bloody or looks like coffee grounds, severe headache, dizziness/fainting, unusual or persistent tiredness/weakness, bloody/black/tarry stools, chest pain, shortness of breath, difficulty swallowing. Tell your doctor right away if any of these unlikely but serious side effects occur: persistent nausea/vomiting, severe stomach/abdominal pain, yellowing eyes/skin. This drug rarely has caused very serious (possibly fatal) problems if its effects lead to small blood clots (usually at the beginning of treatment). This can lead to severe skin/tissue damage that may require surgery or amputation if left  untreated. Patients with certain blood conditions (protein C or S deficiency) may be at greater risk. Get medical help right away if any of these rare but serious side effects occur: painful/red/purplish patches on the skin (such as on the toe, breast, abdomen), change in the amount of urine, vision changes, confusion, slurred speech, weakness on one side of the body. A very serious allergic reaction to this drug is rare. However, get medical help right away if you notice any symptoms of a serious allergic reaction, including: rash, itching/swelling (especially of the face/tongue/throat), severe dizziness, trouble breathing. This is not a complete list of possible side effects. If you notice other effects not listed above, contact your doctor or pharmacist. In the [place] - Call your doctor for medical advice about side effects. You may report side effects to FDA at 9-277-XGF-8846. In [place] - Call your doctor for medical advice about side effects. You may report side effects to [facility] [place] at [phone].      PRECAUTIONS:  Before taking warfarin, tell your doctor or pharmacist if you are allergic to it; or if you have any other allergies. This product may contain inactive ingredients, which can cause allergic reactions or other problems. Talk to your pharmacist for more details. Before using this medication, tell your doctor or pharmacist your medical history, especially of: blood disorders (such as anemia, hemophilia), bleeding problems (such as bleeding of the stomach/intestines, bleeding in the brain), blood vessel disorders (such as aneurysms), recent major injury/surgery, liver disease, alcohol use, mental/mood disorders (including memory problems), frequent falls/injuries. It is important that all your doctors and dentists know that you take warfarin. Before having surgery or any medical/dental procedures, tell your doctor or dentist that you are taking this medication and about all the products you use  (including prescription drugs, nonprescription drugs, and herbal products). Avoid getting injections into the muscles. If you must have an injection into a muscle (for example, a flu shot), it should be given in the arm. This way, it will be easier to check for bleeding and/or apply pressure bandages. This medication may cause stomach bleeding. Daily use of alcohol while using this medicine will increase your risk for stomach bleeding and may also affect how this medication works. Limit or avoid alcoholic beverages. If you have not been eating well, if you have an illness or infection that causes fever, vomiting, or diarrhea for more than 2 days, or if you start using any antibiotic medications, contact your doctor or pharmacist immediately because these conditions can affect how warfarin works. This medication can cause heavy bleeding. To lower the chance of getting cut, bruised, or injured, use great caution with sharp objects like safety razors and nail cutters. Use an electric razor when shaving and a soft toothbrush when brushing your teeth. Avoid activities such as contact sports. If you fall or injure yourself, especially if you hit your head, call your doctor immediately. Your doctor may need to check you. The Food & Drug Administration has stated that generic warfarin products are interchangeable. However, consult your doctor or pharmacist before switching warfarin products. Be careful not to take more than one medication that contains warfarin unless specifically directed by the doctor or health care provider who is monitoring your warfarin treatment. Older adults may be at greater risk for bleeding while using this drug. This medication is not recommended for use during pregnancy because of serious (possibly fatal) harm to an unborn baby. Discuss the use of reliable forms of birth control with your doctor. If you become pregnant or think you may be pregnant, tell your doctor immediately. If you are  "planning pregnancy, discuss a plan for managing your condition with your doctor before you become pregnant. Your doctor may switch the type of medication you use during pregnancy. Very small amounts of this medication may pass into breast milk but is unlikely to harm a nursing infant. Consult your doctor before breast-feeding.      DRUG INTERACTIONS:  Drug interactions may change how your medications work or increase your risk for serious side effects. This document does not contain all possible drug interactions. Keep a list of all the products you use (including prescription/nonprescription drugs and herbal products) and share it with your doctor and pharmacist. Do not start, stop, or change the dosage of any medicines without your doctor's approval. Warfarin interacts with many prescription, nonprescription, vitamin, and herbal products. This includes medications that are applied to the skin or inside the vagina or rectum. The interactions with warfarin usually result in an increase or decrease in the \"blood-thinning\" (anticoagulant) effect. Your doctor or other health care professional should closely monitor you to prevent serious bleeding or clotting problems. While taking warfarin, it is very important to tell your doctor or pharmacist of any changes in medications, vitamins, or herbal products that you are taking. Some products that may interact with this drug include: capecitabine, imatinib, mifepristone. Aspirin, aspirin-like drugs (salicylates), and nonsteroidal anti-inflammatory drugs (NSAIDs such as ibuprofen, naproxen, celecoxib) may have effects similar to warfarin. These drugs may increase the risk of bleeding problems if taken during treatment with warfarin. Carefully check all prescription/nonprescription product labels (including drugs applied to the skin such as pain-relieving creams) since the products may contain NSAIDs or salicylates. Talk to your doctor about using a different medication (such " as acetaminophen) to treat pain/fever. Low-dose aspirin and related drugs (such as clopidogrel, ticlopidine) should be continued if prescribed by your doctor for specific medical reasons such as heart attack or stroke prevention. Consult your doctor or pharmacist for more details. Many herbal products interact with warfarin. Tell your doctor before taking any herbal products, especially bromelains, coenzyme Q10, cranberry, danshen, dong quai, fenugreek, garlic, ginkgo biloba, ginseng, and Desmond's wort, among others. This medication may interfere with a certain laboratory test to measure theophylline levels, possibly causing false test results. Make sure laboratory personnel and all your doctors know you use this drug.      OVERDOSE:  If overdose is suspected, contact a poison control center or emergency room immediately. [place] residents can call the [place] National Poison Hotline at 1-876.926.9817. [place] residents can call a provincial poison control center. Symptoms of overdose may include: bloody/black/tarry stools, pink/dark urine, unusual/prolonged bleeding.      NOTES:  Do not share this medication with others. Laboratory and/or medical tests (such as INR, complete blood count) must be performed periodically to monitor your progress or check for side effects. Consult your doctor for more details.      MISSED DOSE:  For the best possible benefit, do not miss any doses. If you do miss a dose and remember on the same day, take it as soon as you remember. If you remember on the next day, skip the missed dose and resume your usual dosing schedule. Do not double the dose to catch up because this could increase your risk for bleeding. Keep a record of missed doses to give to your doctor or pharmacist. Contact your doctor or pharmacist if you miss 2 or more doses in a row.      STORAGE:  Store at room temperature away from light and moisture. Do not store in the bathroom. Keep all medications away from children and pets.  Do not flush medications down the toilet or pour them into a drain unless instructed to do so. Properly discard this product when it is  or no longer needed. Consult your pharmacist or local waste disposal company for more details about how to safely discard your product.      MEDICAL ALERT:  Your condition and medication can cause complications in a medical emergency. For information about enrolling in MedicAlert, call 1-404.200.1034 ([place]) or 1-611.143.4634 ([place]).      Information last revised 2010 Copyright(c)  [employer]             Depression / Suicide Risk    As you are discharged from this [facility] facility, it is important to learn how to keep safe from harming yourself.    Recognize the warning signs:  · Abrupt changes in personality, positive or negative- including increase in energy   · Giving away possessions  · Change in eating patterns- significant weight changes-  positive or negative  · Change in sleeping patterns- unable to sleep or sleeping all the time   · Unwillingness or inability to communicate  · Depression  · Unusual sadness, discouragement and loneliness  · Talk of wanting to die  · Neglect of personal appearance   · Rebelliousness- reckless behavior  · Withdrawal from people/activities they love  · Confusion- inability to concentrate     If you or a loved one observes any of these behaviors or has concerns about self-harm, here's what you can do:  · Talk about it- your feelings and reasons for harming yourself  · Remove any means that you might use to hurt yourself (examples: pills, rope, extension cords, firearm)  · Get professional help from the community (Mental Health, Substance Abuse, psychological counseling)  · Do not be alone:Call your Safe Contact- someone whom you trust who will be there for you.  · Call your local CRISIS HOTLINE 546-0921 or 675-060-7747  · Call your local Children's Mobile Crisis Response Team [place] (954) 141-7774 or  www.[employer]  · Call the toll free National Suicide Prevention Hotlines   · National Suicide Prevention Lifeline 825-061-ULPA (4138)  · National Hope Line Network 800-SUICIDE (335-6864)

## 2021-02-01 NOTE — PROGRESS NOTES
Monitor Summary    sr-92      .20.08/.34    12 hour Chart Check  Pt slept 5 hours. Uneventful. Heparin gtt at 26 ml/hr. xa @4am-0.53.

## 2021-02-02 ENCOUNTER — ANTICOAGULATION MONITORING (OUTPATIENT)
Dept: VASCULAR LAB | Facility: MEDICAL CENTER | Age: 46
End: 2021-02-02

## 2021-02-02 ENCOUNTER — HOSPITAL ENCOUNTER (EMERGENCY)
Facility: MEDICAL CENTER | Age: 46
End: 2021-02-03
Attending: EMERGENCY MEDICINE
Payer: OTHER MISCELLANEOUS

## 2021-02-02 ENCOUNTER — APPOINTMENT (OUTPATIENT)
Dept: RADIOLOGY | Facility: MEDICAL CENTER | Age: 46
End: 2021-02-02
Attending: EMERGENCY MEDICINE
Payer: OTHER MISCELLANEOUS

## 2021-02-02 DIAGNOSIS — U07.1 COVID-19: ICD-10-CM

## 2021-02-02 DIAGNOSIS — Z98.890 HISTORY OF CEA (CAROTID ENDARTERECTOMY): ICD-10-CM

## 2021-02-02 DIAGNOSIS — R20.2 PARESTHESIAS: ICD-10-CM

## 2021-02-02 LAB
BASOPHILS # BLD AUTO: 0.4 % (ref 0–1.8)
BASOPHILS # BLD: 0.05 K/UL (ref 0–0.12)
EOSINOPHIL # BLD AUTO: 0.24 K/UL (ref 0–0.51)
EOSINOPHIL NFR BLD: 2.1 % (ref 0–6.9)
ERYTHROCYTE [DISTWIDTH] IN BLOOD BY AUTOMATED COUNT: 45.8 FL (ref 35.9–50)
HCT VFR BLD AUTO: 43.1 % (ref 42–52)
HGB BLD-MCNC: 13.5 G/DL (ref 14–18)
IMM GRANULOCYTES # BLD AUTO: 0.23 K/UL (ref 0–0.11)
IMM GRANULOCYTES NFR BLD AUTO: 2 % (ref 0–0.9)
INR PPP: 3.06 (ref 0.87–1.13)
LYMPHOCYTES # BLD AUTO: 2.33 K/UL (ref 1–4.8)
LYMPHOCYTES NFR BLD: 20.2 % (ref 22–41)
MCH RBC QN AUTO: 27.1 PG (ref 27–33)
MCHC RBC AUTO-ENTMCNC: 31.3 G/DL (ref 33.7–35.3)
MCV RBC AUTO: 86.4 FL (ref 81.4–97.8)
MONOCYTES # BLD AUTO: 1 K/UL (ref 0–0.85)
MONOCYTES NFR BLD AUTO: 8.7 % (ref 0–13.4)
NEUTROPHILS # BLD AUTO: 7.7 K/UL (ref 1.82–7.42)
NEUTROPHILS NFR BLD: 66.6 % (ref 44–72)
NRBC # BLD AUTO: 0 K/UL
NRBC BLD-RTO: 0 /100 WBC
PLATELET # BLD AUTO: 945 K/UL (ref 164–446)
PMV BLD AUTO: 10.3 FL (ref 9–12.9)
PROTHROMBIN TIME: 32.6 SEC (ref 12–14.6)
RBC # BLD AUTO: 4.99 M/UL (ref 4.7–6.1)
WBC # BLD AUTO: 11.6 K/UL (ref 4.8–10.8)

## 2021-02-02 PROCEDURE — 70496 CT ANGIOGRAPHY HEAD: CPT

## 2021-02-02 PROCEDURE — 85025 COMPLETE CBC W/AUTO DIFF WBC: CPT

## 2021-02-02 PROCEDURE — 70498 CT ANGIOGRAPHY NECK: CPT

## 2021-02-02 PROCEDURE — 99284 EMERGENCY DEPT VISIT MOD MDM: CPT

## 2021-02-02 PROCEDURE — 80053 COMPREHEN METABOLIC PANEL: CPT

## 2021-02-02 PROCEDURE — 85610 PROTHROMBIN TIME: CPT

## 2021-02-02 PROCEDURE — 84484 ASSAY OF TROPONIN QUANT: CPT

## 2021-02-02 PROCEDURE — 93005 ELECTROCARDIOGRAM TRACING: CPT | Performed by: EMERGENCY MEDICINE

## 2021-02-02 PROCEDURE — 700117 HCHG RX CONTRAST REV CODE 255: Performed by: EMERGENCY MEDICINE

## 2021-02-02 RX ADMIN — IOHEXOL 80 ML: 350 INJECTION, SOLUTION INTRAVENOUS at 23:58

## 2021-02-02 ASSESSMENT — FIBROSIS 4 INDEX: FIB4 SCORE: 0.26

## 2021-02-02 NOTE — PROGRESS NOTES
I Left a voice message with the patient to call the [facility] to establish care.     Herrera Juan Fleming  [address] [address]    PCP:  Pcp Pt States None  No address on file    Saran Garnett, PharmD, MS, BCACP, [facility]  Phone [phone] fax [phone]    This note was created using voice recognition software (Dragon). The accuracy of the dictation is limited by the abilities of the software. I have reviewed the note prior to signing, however some errors in grammar and context are still possible. If you have any questions related to this note please do not hesitate to contact our office.

## 2021-02-03 ENCOUNTER — PATIENT OUTREACH (OUTPATIENT)
Dept: HEALTH INFORMATION MANAGEMENT | Facility: OTHER | Age: 46
End: 2021-02-03

## 2021-02-03 VITALS
WEIGHT: 189.6 LBS | RESPIRATION RATE: 18 BRPM | BODY MASS INDEX: 30.47 KG/M2 | OXYGEN SATURATION: 96 % | DIASTOLIC BLOOD PRESSURE: 76 MMHG | SYSTOLIC BLOOD PRESSURE: 122 MMHG | HEART RATE: 91 BPM | HEIGHT: 66 IN | TEMPERATURE: 98 F

## 2021-02-03 LAB
ALBUMIN SERPL BCP-MCNC: 3.6 G/DL (ref 3.2–4.9)
ALBUMIN/GLOB SERPL: 1 G/DL
ALP SERPL-CCNC: 97 U/L (ref 30–99)
ALT SERPL-CCNC: 107 U/L (ref 2–50)
ANION GAP SERPL CALC-SCNC: 10 MMOL/L (ref 7–16)
AST SERPL-CCNC: 56 U/L (ref 12–45)
BILIRUB SERPL-MCNC: <0.2 MG/DL (ref 0.1–1.5)
BUN SERPL-MCNC: 16 MG/DL (ref 8–22)
CALCIUM SERPL-MCNC: 8.8 MG/DL (ref 8.5–10.5)
CHLORIDE SERPL-SCNC: 101 MMOL/L (ref 96–112)
CO2 SERPL-SCNC: 24 MMOL/L (ref 20–33)
CREAT SERPL-MCNC: 0.77 MG/DL (ref 0.5–1.4)
EKG IMPRESSION: NORMAL
GLOBULIN SER CALC-MCNC: 3.6 G/DL (ref 1.9–3.5)
GLUCOSE SERPL-MCNC: 115 MG/DL (ref 65–99)
POTASSIUM SERPL-SCNC: 4.2 MMOL/L (ref 3.6–5.5)
PROT SERPL-MCNC: 7.2 G/DL (ref 6–8.2)
SODIUM SERPL-SCNC: 135 MMOL/L (ref 135–145)
TROPONIN T SERPL-MCNC: 7 NG/L (ref 6–19)

## 2021-02-03 NOTE — PROGRESS NOTES
Community Health Worker Intake    • Contact information provided to Herrera Barros   • Has PCP appointment scheduled for: Monday, February 8 at 4:00 pm  • Oupatient assessment completed.  • Did the patient receive medications post discharge: Yes    LEIGH Amaro reached out to pt via TC to f/u after d/c. Pt indicated he was doing well and had gotten established with a PCP, Dr. Mckenzie. He has a f/u scheduled and will have transportation there. Pt picked up all of his medications from [facility], no side-effects so far. He declined speaking with CCM RN and pharmacists and does not need additional resources/services at this time.     Plan: d/c due to all goals met 2/3.

## 2021-02-03 NOTE — ED TRIAGE NOTES
Chief Complaint   Patient presents with   • Numbness     Pt to triage via wheelchair c/o R sided arm and leg numbness that started on Sun.  Pt tested positive for Covid 1/17 and Thrombosis of right common carotid artery 1/27    Pt & staff masked and in appropriate PPE during encounter.  Pt denies fever/travel or being in contact with anyone testing positive for Covid.  Explained pt the triage process, made pt aware to tell the RN/staff of any changes/concerns, pt verbalized understanding of process and instructions given.  Pt to ER lobby.

## 2021-02-03 NOTE — DOCUMENTATION QUERY
[facility]                                                                       Query Response Note      PATIENT:               ADDIE OHARA  ACCT #:                  [id number]  MRN:                     [id number]  :                      1975  ADMIT DATE:       2021 6:07 PM  DISCH DATE:        2021 1:44 PM  RESPONDING  PROVIDER #:        [id number]           QUERY TEXT:    COVID 19 infection, a heart rate > 90, and acute hypoxemic respiratory failure with respiratory rate > 20 are documented in the Medical Record. Can a relationship, if any, between these findings be established?    NOTE:  If an appropriate response is not listed below, please respond with a new note.    The patient's Clinical Indicators include:  Per H&P  -Temp:  [37.5 °C (99.5 °F)-37.9 °C (100.2 °F)] 37.5 °C (99.5 °F)   -Pulse:  [85-94] 86   -Resp:  [42-49] 49   -BP: (126-146)/(75-95) 143/88   -SpO2:  [97 %-99 %] 99 %  -Acute hypoxemic respiratory failure due to COVID-19    Results Review:   WBC 13.1  Lactic acid 2.5, 1.3  Procalcitonin 0.18  CXR: Multifocal bilateral pneumonia.    Treatment:   Decadron, Mucinex, Unasyn, azithromycin, RT Consult, supplemental O2 8L via N/C, inflammatory markers, & close clinical monitoring     Risk Factors:   COVID 19 infection, acute hypoxemic respiratory failure, heart rate >90, respiratory rate > 20, newly diagnosed DM2, & hx of obesity & HTN    Thank You,  Jackeline Shabazz RN BSN  Clinical   Connect via [employer] Messenger  Options provided:   -- Sepsis due to COVID 19 with acute hypoxemic respiratory failure present on admission ruled in   -- Sepsis due to COVID 19 without acute organ dysfunction present on admission ruled in   -- Sepsis ruled out; SIRS of non-infectious origin   -- Sepsis/SIRS has been ruled out   -- Unable to determine      Query created by: Jackeline Shabazz on 2021  12:11 PM    RESPONSE TEXT:    Sepsis due to COVID 19 with acute hypoxemic respiratory failure present on admission ruled in          Electronically signed by:  MOOKIE CHARLES MD 2/2/2021 7:27 PM

## 2021-02-03 NOTE — ED PROVIDER NOTES
ED Provider Note    ED Provider Note    Primary care provider: Pcp Pt States None  Means of arrival: POV  History obtained from: Patient    CHIEF COMPLAINT  Chief Complaint   Patient presents with   • Numbness     Seen at 10:34 PM.   HPI  Herrera Barros is a 45 y.o. male who presents to the Emergency Department for paresthesias/numbness of the right thigh.  The patient is status post carotid endarterectomy for right carotid artery thrombosis, presumably provoked by recent COVID-19 infection.  The patient's initial symptoms were paresthesias and numbness of the left upper extremity.  He states that the symptoms resolved in the left arm though he has had return of the sensation of ants crawling on his left forearm.  This is been an issue since hospitalization.    He notes a new sensation of some paresthesias of the right proximal leg from the hip down to the knee.  He states that it feels different compared to the left, he still does have sensation but decreased.  This began while he was in the hospital but was somewhat intermittent and has been more persistent since yesterday morning (he was still in the hospital at that time).  He discussed this with his daughter and they decided to come in [facility] for reevaluation.  He denies any spreading numbness.  He denies any weakness.  He denies any headache or neck pain.  He denies any chest pain.  He has some intermittent shortness of breath since acquiring Covid, he has a nasal cannula at home which he puts at 3 L when he feels dyspneic.  Patient denies any back pain, abdominal pain, groin pain or leg pain.  REVIEW OF SYSTEMS  See HPI,   Remainder of ROS negative.     PAST MEDICAL HISTORY   has a past medical history of Arrhythmia, Hypertension, Renal disorder (2020), and Snoring.    SURGICAL HISTORY   has a past surgical history that includes other orthopedic surgery (Left, 2001); cystoscopy,insert ureteral stent (10/12/2020); cysto/uretero/pyeloscopy, dx  "(10/12/2020); ventral hernia repair robotic xi (11/3/2020); thromboendartectmy neck,neck incis (Right, 1/27/2021); and angiogram (Right, 1/27/2021).    SOCIAL HISTORY  Social History     Tobacco Use   • Smoking status: Never Smoker   • Smokeless tobacco: Never Used   Substance Use Topics   • Alcohol use: Yes     Frequency: Monthly or less     Comment: 1 per month   • Drug use: No      Social History     Substance and Sexual Activity   Drug Use No       FAMILY HISTORY  Family History   Problem Relation Age of Onset   • Hypertension Mother    • Hypertension Father        CURRENT MEDICATIONS  Reviewed.  See Encounter Summary.     ALLERGIES  No Known Allergies    PHYSICAL EXAM  VITAL SIGNS: /76   Pulse 91   Temp 36.7 °C (98 °F) (Oral)   Resp 18   Ht 1.676 m (5' 6\")   Wt 86 kg (189 lb 9.5 oz)   SpO2 96%   BMI 30.60 kg/m²   Constitutional: Awake, alert in no apparent distress.  HENT: Normocephalic, Bilateral external ears normal. Nose normal.  Right anterior neck status post carotid endarterectomy.  Incision site is healing well without dehiscence or purulence.  Eyes: Conjunctiva normal, non-icteric, EOMI.    Thorax & Lungs: Easy unlabored respirations, Clear to ascultation bilaterally.  Cardiovascular: Regular rate, Regular rhythm, No murmurs, rubs or gallops. Bilateral pulses symmetrical.  Bounding dorsalis pedis bilaterally.  Legs warm to the touch.  Abdomen:  Soft, nontender, nondistended, normal active bowel sounds.   :    Skin: Visualized skin is  Dry, No erythema, No rash.   Musculoskeletal:   No cyanosis, clubbing or edema. No leg asymmetry.  No tenderness in the right groin.  Neurologic: Alert, Grossly non-focal.  Sensation subjectively decreased on the right thigh in a anterior and posterior distribution without focality.  Not dermatomal in distribution.  Good strength bilateral lower extremities.  Sensation is intact to light touch throughout with the exception of the right thigh.  No " "drift.  Psychiatric: Normal affect, Normal mood  Lymphatic:  No cervical LAD    EKG   12 lead Interpreted by me  Rhythm:  Normal sinus rhythm   Rate: 92  Axis: normal  Ectopy: none  Conduction: normal  ST Segments: no acute change  T Waves: no acute change  Clinical Impression: Normal EKG without acute changes     RADIOLOGY  CT-CTA HEAD WITH & W/O-POST PROCESS   Final Result      No large vessel occlusion, hemodynamically significant stenosis or aneurysm.      Hypodensity at the right frontoparietal vertex did not with recent infarct. See recent brain MRI 1/29/2021. No acute intracranial hemorrhage.      CT-CTA NECK WITH & W/O-POST PROCESSING   Final Result      Previously seen right common carotid artery thrombus is no longer visualized with interval postoperative changes seen.      No significant stenosis of the carotid or vertebral arteries.      Extensive pulmonary opacities again noted presumably related to Covid pneumonia.            COURSE & MEDICAL DECISION MAKING  Pertinent Labs & Imaging studies reviewed. (See chart for details)    Differential diagnoses include but are not limited to: Paresthesias, less likely occlusion of carotid endarterectomy, TIA or CVA.    10:34 PM - Medical record reviewed, discharge narrative from yesterday:\"Covid infection 01/22/2021. Patient was placed on steroids and oxygen. On 1/26/2021 had neurological changes consisting of left upper extremity numbness and weakness.  A CTA of the head and neck was obtained showing a right common carotid artery thrombus with 65% stenosis.  The patient was not a TPA candidate.  Vascular surgery was consulted; the patient underwent a carotid endarterectomy with thrombectomy and subsequently was on a heparin drip and bridged to coumadin. 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  Overall, these infarcts are consistent with the vascular territory supplied by the carotid " "artery and are likely secondary to watershed injury from thrombus in the carotid. Patient had no lasting neurologic deficits.\"      Decision Making:  This is a 45 y.o. year old male who presents with paresthesias of the right proximal lower extremity.  The distribution is not in a dermatomal pattern, it is not consistent with peripheral nerve compression.  The patient also does not have any inguinal pain or thigh pain, not consistent with a DVT.  Pulses are bounding distally, not concerning for limb ischemia.    The patient recently underwent a carotid endarterectomy for thrombus related to COVID-19.  He had watershed infarct on the right hemisphere which would explain the patient's left upper extremity paresthesias.  With regards to his left upper extremity he still notes some paresthesias that have been waxing and waning but certainly no worse than when he was hospitalized earlier this week.  The right thigh apparently was present on his hospitalization as well, it also has a waxing and waning nature to it.  The distribution is not concerning for peripheral neuropathy.  It seems very unlikely to the a CVA/TIA as the distribution is on the opposite side of his CEA.  In addition he does not have any other deficits and this is a sensory only issue, further making cerebral ischemia less likely.  The patient is therapeutic on Coumadin with an INR at 3.  EKG does not show any signs of arrhythmia/atrial fibrillation.  He is maximally medically managed at this point with regards to CVA.    I do not feel that admission for an MRI would  currently.  In addition he did recently undergo an MRI when he was still having symptoms of the right thigh paresthesias.    At this point I feel the patient is stable for discharge.  He is oxygenating well on his home nasal cannula settings.  He has no significant neurological deficit aside from the paresthesias of the right thigh.  I have given him the contact formation " for follow-up.  If he notes any weakness or spreading paresthesias I do recommend emergent reevaluation.    Discharge Medications:  Discharge Medication List as of 2/3/2021 12:48 AM          The patient was discharged home (see d/c instructions) was told to return immediately for any signs or symptoms listed, or any worsening at all.  The patient verbally agreed to the discharge precautions and follow-up plan which is documented in EPIC.    The patient's blood pressure is elevated today. >120/80. I have referred them to primary care for follow up.       FINAL IMPRESSION  1. Paresthesias    2. COVID-19    3. History of CEA (carotid endarterectomy)

## 2021-02-05 ENCOUNTER — ANTICOAGULATION VISIT (OUTPATIENT)
Dept: VASCULAR LAB | Facility: MEDICAL CENTER | Age: 46
End: 2021-02-05
Attending: INTERNAL MEDICINE
Payer: OTHER MISCELLANEOUS

## 2021-02-05 DIAGNOSIS — I65.21 THROMBOSIS OF RIGHT COMMON CAROTID ARTERY: ICD-10-CM

## 2021-02-05 LAB
INR PPP: 6.9 (ref 2–3.5)
INR PPP: 8 (ref 2–3.5)

## 2021-02-05 PROCEDURE — 99213 OFFICE O/P EST LOW 20 MIN: CPT

## 2021-02-05 PROCEDURE — 85610 PROTHROMBIN TIME: CPT

## 2021-02-05 NOTE — PROGRESS NOTES
Anticoagulation Summary  As of 2021    INR goal:  2.0-3.0   TTR:  --   INR used for dosin.90 (2021)   Warfarin maintenance plan:  4 mg (4 mg x 1) every day   Weekly warfarin total:  28 mg   Plan last modified:  Jovan Wick, PharmD (2021)   Next INR check:  2021   Target end date:  2021    Indications    Thrombosis of right common carotid artery [I65.21]             Anticoagulation Episode Summary     INR check location:      Preferred lab:      Send INR reminders to:      Comments:        Anticoagulation Care Providers     Provider Role Specialty Phone number    Riley Winkler D.O. Referring Internal Medicine [phone]    [facility] Responsible  [phone]        Anticoagulation Patient Findings    Pt is new to warfarin and new to [facility].  Discussed indication for warfarin therapy and INR goal range. Explained our services, hours of operation, warfarin therapy, potential SE, potential DI. Discussed diet at length, with an emphasis on foods rich in vitamin K.  Discussed monitoring parameters, such as blood in urine, blood in stool, discussed what to do if a dose is missed, or suspected as missed.  Emphasized importance of compliance including follow up. Discussed lifestyle choices of ETOH & smoking and its impact on therapy.    Patient is a  45-year-old male who was recenlty admitted with Covid infection on 2021. On 2021 he had neurological changes consisting of left upper extremity numbness and weakness.  A CTA of the head and neck showed a right common carotid artery thrombus with 65% stenosis.  Vascular surgery was consulted and the patient underwent a carotid endarterectomy with thrombectomy and subsequently was started on a heparin drip and bridged to coumadin.    He was discharged on warfarin 4 mg daily once INR was therapeutic, per referring provider Riley Winkler D.O. duration of treatment is 3 months    HASBLED= 2 (ALT 2x UNL,  HTN)    Patient's INR is 6.9. Patient denies any unusual bleeding or bruising at this time    Patient instructed to HOLD warfarin X 2 days then take 2 mg on Sunday    Follow-up in 3 days    Pt is not on ASA 81mg.   Can pt be transitioned to DOAC?, possible but not studied in this indication     Pt signed new patient contract. Copy will be scanned into media.     Added [facility] to care team    Jovan Wick, Pharm.D    Cc Dr Bloch

## 2021-02-08 ENCOUNTER — ANTICOAGULATION VISIT (OUTPATIENT)
Dept: VASCULAR LAB | Facility: MEDICAL CENTER | Age: 46
End: 2021-02-08
Attending: INTERNAL MEDICINE
Payer: OTHER MISCELLANEOUS

## 2021-02-08 ENCOUNTER — TELEPHONE (OUTPATIENT)
Dept: VASCULAR LAB | Facility: MEDICAL CENTER | Age: 46
End: 2021-02-08

## 2021-02-08 VITALS — WEIGHT: 193 LBS | BODY MASS INDEX: 31.15 KG/M2

## 2021-02-08 DIAGNOSIS — I65.21 THROMBOSIS OF RIGHT COMMON CAROTID ARTERY: ICD-10-CM

## 2021-02-08 LAB
INR BLD: 1.5 (ref 0.9–1.2)
INR BLD: 6.9 (ref 0.9–1.2)
INR PPP: 1.5 (ref 2–3.5)

## 2021-02-08 PROCEDURE — 99213 OFFICE O/P EST LOW 20 MIN: CPT

## 2021-02-08 PROCEDURE — 85610 PROTHROMBIN TIME: CPT

## 2021-02-08 ASSESSMENT — FIBROSIS 4 INDEX: FIB4 SCORE: 0.27

## 2021-02-08 NOTE — PROGRESS NOTES
Anticoagulation Summary  As of 2021    INR goal:  2.0-3.0   TTR:  --   INR used for dosin.50 (2021)   Warfarin maintenance plan:  4 mg (4 mg x 1) every day   Weekly warfarin total:  28 mg   Plan last modified:  Jovan Wick, PharmD (2021)   Next INR check:  2/10/2021   Target end date:  2021    Indications    Thrombosis of right common carotid artery [I65.21]             Anticoagulation Episode Summary     INR check location:      Preferred lab:      Send INR reminders to:      Comments:        Anticoagulation Care Providers     Provider Role Specialty Phone number    Riley Winkler D.O. Referring Internal Medicine [phone]    [facility] Responsible  [phone]           Language Line - [phone]  [employer] [facility] ID - [id number]  Interpeter: Maddy (ID [id number])      Anticoagulation Patient Findings      HPI:  Herrera Barros seen in clinic today, on anticoagulation therapy with warfarin for right common carotid artery thrombosis  Changes to current medical/health status since last appt: No  Denies signs/symptoms of bleeding and/or thrombosis since the last appt.    Denies any interval changes to diet  Denies any interval changes to medications since last appt.   Denies any complications or cost restrictions with current therapy.   Verified current warfarin dosing schedule.   Pt declined vitals  Medications reconciled  Patient was started     A/P   INR is now sub-therapeutic.   Bolus 6mg today then resume 4mg qday. As INR is subtherapeutic and patient recenlty had thrombosis of the right common carotid artery, advised patient to begin Lovenox 120mg qday starting today until he sees us again. Sent Rx for 10 injections.    Follow up appointment in 2 days.    Jenifer King, PharmD

## 2021-02-09 NOTE — TELEPHONE ENCOUNTER
Initial anticoagulation clinic note and most recent discharge summary reviewed    Patient with Covid associated carotid thrombosis with CVA.  He is status post carotid thrombectomy and endarterectomy.    There is some confusion as to whether or not he is currently on aspirin in addition to warfarin.  We will ask clinical pharmacist to confirm.  Does appear the vascular surgery recommended low-dose aspirin in addition to anticoagulation while patient was hospitalized    Difficult to determine appropriate length of therapy for this indication.  It is unclear whether or not patient has made a follow-up appointment to see vascular surgery.  Will attempt to have patient evaluated in vascular medicine clinic by [facility]-speaking provider for further evaluation and management decisions -referral placed    Michael J. Bloch, MD  Anticoagulation Center    Cc:  I Deonna Bella

## 2021-02-10 ENCOUNTER — ANTICOAGULATION VISIT (OUTPATIENT)
Dept: VASCULAR LAB | Facility: MEDICAL CENTER | Age: 46
End: 2021-02-10
Attending: INTERNAL MEDICINE
Payer: OTHER MISCELLANEOUS

## 2021-02-10 VITALS — DIASTOLIC BLOOD PRESSURE: 86 MMHG | HEART RATE: 99 BPM | SYSTOLIC BLOOD PRESSURE: 136 MMHG

## 2021-02-10 DIAGNOSIS — I65.21 THROMBOSIS OF RIGHT COMMON CAROTID ARTERY: ICD-10-CM

## 2021-02-10 LAB
INR BLD: 3.5 (ref 0.9–1.2)
INR PPP: 3.6 (ref 2–3.5)

## 2021-02-10 PROCEDURE — 85610 PROTHROMBIN TIME: CPT

## 2021-02-10 PROCEDURE — 99213 OFFICE O/P EST LOW 20 MIN: CPT | Performed by: NURSE PRACTITIONER

## 2021-02-10 NOTE — Clinical Note
Hi,  This pt is taking ASA 81 mg daily.  I didn't realize I should've asked if he has vascular sx follow up. Oops. I'll ask Friday's staff to confirm.

## 2021-02-10 NOTE — PROGRESS NOTES
Anticoagulation Summary  As of 2/10/2021    INR goal:  2.0-3.0   TTR:  --   INR used for dosing:  3.60 (2/10/2021)   Warfarin maintenance plan:  4 mg (4 mg x 1) every day   Weekly warfarin total:  28 mg   Plan last modified:  Jovan Wick, PharmD (2/5/2021)   Next INR check:  2/12/2021   Target end date:  5/7/2021    Indications    Thrombosis of right common carotid artery [I65.21]             Anticoagulation Episode Summary     INR check location:      Preferred lab:      Send INR reminders to:      Comments:        Anticoagulation Care Providers     Provider Role Specialty Phone number    Riley Winkler D.O. Referring Internal Medicine [phone]    [facility] Responsible  [phone]        Anticoagulation Patient Findings     Language Line - [phone]  [facility] ID - [id number]    HPI:  Herrera Barros seen in clinic today for follow up on anticoagulation therapy in the presence of carotid artery thrombosis.   He is bridging with Lovenox 120 mg daily as instructed.  Denies any medication or diet changes.   No current symptoms of bleeding or thrombosis reported.    Confirmed he is taking ASA 81 mg.  Pt on antiplatelet therapy Aspirin 81mg s/p carotid thrombectomy and endaretctomy and must be reviewed again with vascular or vascular sx.    A/P:   INR supratherapeutic. Instructed to STOP Lovenox and take warfarin as outlined on calendar.    BP recorded in vitals.    Follow up appointment in 2 days.    Next Appointment: Friday, Feb 12, 2021 at 8:30 am.    Stacie WOODALL

## 2021-02-12 ENCOUNTER — ANTICOAGULATION VISIT (OUTPATIENT)
Dept: VASCULAR LAB | Facility: MEDICAL CENTER | Age: 46
End: 2021-02-12
Attending: INTERNAL MEDICINE
Payer: OTHER MISCELLANEOUS

## 2021-02-12 DIAGNOSIS — I65.21 THROMBOSIS OF RIGHT COMMON CAROTID ARTERY: ICD-10-CM

## 2021-02-12 LAB — INR PPP: 3.5 (ref 2–3.5)

## 2021-02-12 PROCEDURE — 85610 PROTHROMBIN TIME: CPT

## 2021-02-12 PROCEDURE — 99213 OFFICE O/P EST LOW 20 MIN: CPT

## 2021-02-12 NOTE — PROGRESS NOTES
Anticoagulation Summary  As of 2/12/2021    INR goal:  2.0-3.0   TTR:  --   INR used for dosing:  3.50 (2/12/2021)   Warfarin maintenance plan:  4 mg (4 mg x 1) every day   Weekly warfarin total:  28 mg   Plan last modified:  Brody JuniorD (2/5/2021)   Next INR check:  2/16/2021   Target end date:  5/7/2021    Indications    Thrombosis of right common carotid artery [I65.21]             Anticoagulation Episode Summary     INR check location:      Preferred lab:      Send INR reminders to:      Comments:        Anticoagulation Care Providers     Provider Role Specialty Phone number    Riley Winkler D.O. Referring Internal Medicine [phone]    [facility] Responsible  [phone]        Anticoagulation Patient Findings     Language Line - [phone]  [facility] ID - [id number]  [place], [place] [id number]    HPI:  Herrera Martinez La Monse Cerda seen in clinic today, on anticoagulation therapy with warfarin for carotid artery thrombosis  Changes to current medical/health status since last appt: None  Denies signs/symptoms of bleeding and/or thrombosis since the last appt.    Denies any interval changes to diet.  Denies any interval changes to medications since last appt.   Denies any complications or cost restrictions with current therapy.   BP declined.    Pt on antiplatelet therapy Aspirin 81mg s/p carotid thrombectomy and endaretctomy and must be reviewed again with vascular or vascular sx.    A/P   INR  SUPRA-therapeutic.   Instructed pt to hold x 1 dose and to then take 2 mg daily until f/u.    Confirmed that pt does have f/u appt on 2/23 w/ vascular surgeon.    Noted that pt has appt to establish care w/ Dr. Jarvis on 4/1/21 - counseled pt on this.    Follow up appointment in 4 day(s).    Bean Ortega, BrodyD

## 2021-02-16 ENCOUNTER — ANTICOAGULATION VISIT (OUTPATIENT)
Dept: VASCULAR LAB | Facility: MEDICAL CENTER | Age: 46
End: 2021-02-16
Attending: INTERNAL MEDICINE
Payer: OTHER MISCELLANEOUS

## 2021-02-16 DIAGNOSIS — I65.21 THROMBOSIS OF RIGHT COMMON CAROTID ARTERY: ICD-10-CM

## 2021-02-16 LAB — INR PPP: 1.4 (ref 2–3.5)

## 2021-02-16 PROCEDURE — 85610 PROTHROMBIN TIME: CPT

## 2021-02-16 PROCEDURE — 99213 OFFICE O/P EST LOW 20 MIN: CPT

## 2021-02-16 NOTE — PROGRESS NOTES
Anticoagulation Summary  As of 2021    INR goal:  2.0-3.0   TTR:  0.0 % (1 d)   INR used for dosin.40 (2021)   Warfarin maintenance plan:  4 mg (4 mg x 1) every day   Weekly warfarin total:  28 mg   Plan last modified:  Brody JuniorD (2021)   Next INR check:  2021   Target end date:  2021    Indications    Thrombosis of right common carotid artery [I65.21]             Anticoagulation Episode Summary     INR check location:      Preferred lab:      Send INR reminders to:      Comments:        Anticoagulation Care Providers     Provider Role Specialty Phone number    Riley Winkler D.O. Referring Internal Medicine [phone]    [facility] Responsible  [phone]           Translation services were used for this visit        Anticoagulation Patient Findings      HPI:  Herrera Martinez [place] Zaida seen in clinic today, on anticoagulation therapy with warfarin for carotid artery thrombosis  Changes to current medical/health status since last appt: DENIES  Denies signs/symptoms of bleeding and/or thrombosis since the last appt.    Denies any interval changes to diet  Denies any interval changes to medications since last appt.   Denies any complications or cost restrictions with current therapy.   Verified current warfarin dosing schedule.   Pt declines vitals due to Covid transmission concerns.   Medications reconciled  Pt on antiplatelet therapy Aspirin 81mg for s/p carotid thrombectomy/endarectctomy and must be reviewed again with Vasc - next appt 2021      A/P   INR  SUB-therapeutic at 1.4 and has varied since recent start of warfarin.  Pt is to take 4mg tonight only, then 2mg once daily until next INR in 3 days      Follow up appointment in 3 days    Dillon Valero, BrodyD

## 2021-02-17 LAB
INR BLD: 1.4 (ref 0.9–1.2)
INR BLD: 3.5 (ref 0.9–1.2)

## 2021-02-19 ENCOUNTER — ANTICOAGULATION VISIT (OUTPATIENT)
Dept: VASCULAR LAB | Facility: MEDICAL CENTER | Age: 46
End: 2021-02-19
Attending: INTERNAL MEDICINE
Payer: OTHER MISCELLANEOUS

## 2021-02-19 DIAGNOSIS — I65.21 THROMBOSIS OF RIGHT COMMON CAROTID ARTERY: ICD-10-CM

## 2021-02-19 LAB
INR BLD: 1.5 (ref 0.9–1.2)
INR PPP: 1.5 (ref 2–3.5)

## 2021-02-19 PROCEDURE — 85610 PROTHROMBIN TIME: CPT

## 2021-02-19 PROCEDURE — 99213 OFFICE O/P EST LOW 20 MIN: CPT

## 2021-02-19 NOTE — PROGRESS NOTES
Anticoagulation Summary  As of 2021    INR goal:  2.0-3.0   TTR:  0.0 % (4 d)   INR used for dosin.50 (2021)   Warfarin maintenance plan:  4 mg (4 mg x 1) every day   Weekly warfarin total:  28 mg   Plan last modified:  Jovan Wick PharmD (2021)   Next INR check:  2021   Target end date:  2021    Indications    Thrombosis of right common carotid artery [I65.21]             Anticoagulation Episode Summary     INR check location:      Preferred lab:      Send INR reminders to:      Comments:        Anticoagulation Care Providers     Provider Role Specialty Phone number    Riley Winkler D.O. Referring Internal Medicine [phone]    [facility] Responsible  [phone]        Anticoagulation Patient Findings      HPI:  Herrera Melgar Zaida seen in clinic today for follow up on anticoagulation therapy in the presence of Thrombosis of right common carotid artery.   Denies any changes to current medical/health status since last appointment.   Denies any medication or diet changes.   No current symptoms of bleeding or thrombosis reported.    Pt on antiplatelet therapy Aspirin 81mg for s/p carotid thrombectomy/endarectctomy and must be reviewed again with Vasc - next appt 2021.    A/P:   INR is still sub-therapeutic at 1.5.   Patient is to take increased dosing as outlined on calender    Follow up appointment in 4 days     Brody Jose.D

## 2021-02-23 ENCOUNTER — ANTICOAGULATION VISIT (OUTPATIENT)
Dept: VASCULAR LAB | Facility: MEDICAL CENTER | Age: 46
End: 2021-02-23
Attending: INTERNAL MEDICINE
Payer: OTHER MISCELLANEOUS

## 2021-02-23 DIAGNOSIS — I65.21 THROMBOSIS OF RIGHT COMMON CAROTID ARTERY: ICD-10-CM

## 2021-02-23 LAB
INR BLD: 2 (ref 0.9–1.2)
INR PPP: 2 (ref 2–3.5)

## 2021-02-23 PROCEDURE — 85610 PROTHROMBIN TIME: CPT

## 2021-02-23 PROCEDURE — 99212 OFFICE O/P EST SF 10 MIN: CPT

## 2021-02-23 NOTE — PROGRESS NOTES
Anticoagulation Summary  As of 2021    INR goal:  2.0-3.0   TTR:  0.0 % (1.1 wk)   INR used for dosin.00 (2021)   Warfarin maintenance plan:  4 mg (4 mg x 1) every day   Weekly warfarin total:  28 mg   Plan last modified:  Jovan Wick PharmD (2021)   Next INR check:  2021   Target end date:  2021    Indications    Thrombosis of right common carotid artery [I65.21]             Anticoagulation Episode Summary     INR check location:      Preferred lab:      Send INR reminders to:      Comments:        Anticoagulation Care Providers     Provider Role Specialty Phone number    Riley Winkler D.O. Referring Internal Medicine [phone]    [facility] Responsible  [phone]            Translation services were used for this visit    Anticoagulation Patient Findings      HPI:  Herrera Barros seen in clinic today, on anticoagulation therapy with warfarin for thrombosis of right carotid artery  Changes to current medical/health status since last appt: DENIES  Denies signs/symptoms of bleeding and/or thrombosis since the last appt.    Denies any interval changes to diet  Denies any interval changes to medications since last appt.   Denies any complications or cost restrictions with current therapy.   Verified current warfarin dosing schedule.   Pt declines vitals due to Covid transmission concerns.   Medications reconciled  Pt NOT on antiplatelet therapy       A/P   INR  therapeutic   Pt is to continue with 4mg daily and repeat INR in 3 days      Follow up appointment in 3 days    Dillon Valero, BrodyD

## 2021-02-26 ENCOUNTER — ANTICOAGULATION VISIT (OUTPATIENT)
Dept: VASCULAR LAB | Facility: MEDICAL CENTER | Age: 46
End: 2021-02-26
Attending: INTERNAL MEDICINE
Payer: OTHER MISCELLANEOUS

## 2021-02-26 DIAGNOSIS — I65.21 THROMBOSIS OF RIGHT COMMON CAROTID ARTERY: ICD-10-CM

## 2021-02-26 LAB — INR PPP: 3 (ref 2–3.5)

## 2021-02-26 PROCEDURE — 85610 PROTHROMBIN TIME: CPT

## 2021-02-26 PROCEDURE — 99212 OFFICE O/P EST SF 10 MIN: CPT

## 2021-02-26 NOTE — PROGRESS NOTES
OP Anticoagulation Service Note    Date: 2/26/2021    Anticoagulation Summary  As of 2/26/2021    INR goal:  2.0-3.0   TTR:  27.3 % (1.6 wk)   INR used for dosing:  3.00 (2/26/2021)   Warfarin maintenance plan:  2 mg (4 mg x 0.5) every Fri; 4 mg (4 mg x 1) all other days   Weekly warfarin total:  26 mg   Plan last modified:  Saran Garnett, PharmD (2/26/2021)   Next INR check:  3/5/2021   Target end date:  5/7/2021    Indications    Thrombosis of right common carotid artery [I65.21]             Anticoagulation Episode Summary     INR check location:      Preferred lab:      Send INR reminders to:      Comments:        Anticoagulation Care Providers     Provider Role Specialty Phone number    Riley Winkler D.O. Referring Internal Medicine [phone]    [facility] Responsible  [phone]        Anticoagulation Patient Findings      HPI:     Herrera Barros is in the Anticoagulation Clinic today for a INR check on their anticoagulation therapy.     The reason for today's visit is to prevent morbidity and mortality from a blood clot and/or stroke and to reduce the risk of bleeding while on a anticoagulant.     PCP:  Jermaine Mckenzie M.D.  [id number] [id number]    3 vitals included with today's appt-unless patient declined:  (BP, HR, weight, ht, RR)   There were no vitals filed for this visit.    Assessment:     • INR  therapeutic.   • Confirmed warfarin dosing regimen: Yes  • Interval Changes with foods rich in vitamin K: No  • Interval Changes in ETOH or cranberries:   No  • Interval Changes in smoking status:  No  • S/S of bleeding or bruising:  No  • Signs/symptoms  thrombosis since the last appt:  No  • Any upcoming procedures that require stopping warfarin and/or using bridge therapy: None  • Interval Changes in medication:  No   • Is pt on antiplatelet therapy: Aspirin 81 mg once daily for ASCVD and must be reviewed again by cardiology.  • Is pt on  NSAID: None      Current Outpatient Medications:   •  benzonatate, 200 mg, Oral, TID PRN  •  tamsulosin, 0.4 mg, Oral, AFTER BREAKFAST  •  warfarin, 4 mg, Oral, DAILY AT 1800  •  aspirin EC, 81 mg, Oral, DAILY  •  atorvastatin, 80 mg, Oral, Q EVENING  •  guaiFENesin ER, 600 mg, Oral, BID PRN  •  acetaminophen, 1,000 mg, Oral, Once PRN  •  Multiple Vitamin (MULTIVITAMINS PO), 3 tablet, Oral, DAILY      Plan:     • Decrease weekly dose to target 2.5    Follow-up:     • Our protocol suggests we test in 1 weeks.    • This decision was made using shared decision making with the pt and benefits vs risks were discussed to reduce Covid exposure.        Additional information discussed with patient:     • Pt educated to contact our clinic with any changes in medications or s/s of bleeding or thrombosis.  • Education was provided today regarding tips to reduce their bleed risk and dietary constraints while on a anticoagulant.    National recommendations regarding anticoagulation therapy:     The CHEST guidelines recommends frequent INR monitoring at regular intervals (a few days up to a max of 12 weeks) to ensure patients are on the proper dose of warfarin, and patients are not having any complications from therapy.  INRs can dramatically change over a short time period due to diet, medications, and medical conditions.       Saran Garnett, PharmD, MS, BCACP, [facility]  Phone [phone] fax [phone]    This note was created using voice recognition software (Dragon). The accuracy of the dictation is limited by the abilities of the software. I have reviewed the note prior to signing, however some errors in grammar and context are still possible. If you have any questions related to this note please do not hesitate to contact our office.

## 2021-03-05 ENCOUNTER — ANTICOAGULATION VISIT (OUTPATIENT)
Dept: VASCULAR LAB | Facility: MEDICAL CENTER | Age: 46
End: 2021-03-05
Attending: INTERNAL MEDICINE
Payer: OTHER MISCELLANEOUS

## 2021-03-05 VITALS — DIASTOLIC BLOOD PRESSURE: 93 MMHG | SYSTOLIC BLOOD PRESSURE: 127 MMHG

## 2021-03-05 DIAGNOSIS — I65.21 THROMBOSIS OF RIGHT COMMON CAROTID ARTERY: ICD-10-CM

## 2021-03-05 LAB — INR PPP: 2.7 (ref 2–3.5)

## 2021-03-05 PROCEDURE — 99211 OFF/OP EST MAY X REQ PHY/QHP: CPT

## 2021-03-05 PROCEDURE — 85610 PROTHROMBIN TIME: CPT

## 2021-03-05 NOTE — PROGRESS NOTES
Anticoagulation Summary  As of 3/5/2021    INR goal:  2.0-3.0   TTR:  55.6 % (2.6 wk)   INR used for dosin.70 (3/5/2021)   Warfarin maintenance plan:  2 mg (4 mg x 0.5) every Fri; 4 mg (4 mg x 1) all other days   Weekly warfarin total:  26 mg   Plan last modified:  Saran Garnett, PharmD (2021)   Next INR check:  3/19/2021   Target end date:  2021    Indications    Thrombosis of right common carotid artery [I65.21]             Anticoagulation Episode Summary     INR check location:      Preferred lab:      Send INR reminders to:      Comments:        Anticoagulation Care Providers     Provider Role Specialty Phone number    Riley Winkler D.O. Referring Internal Medicine [phone]    [facility] Responsible  [phone]         Anticoagulation Patient Findings      HPI:  Herrera Barros seen in clinic today, on anticoagulation therapy with warfarin for thrombosis of right carotid artery  Changes to current medical/health status since last appt: none   Denies signs/symptoms of bleeding and/or thrombosis since the last appt.    Denies any interval changes to diet  Denies any interval changes to medications since last appt.   Denies any complications or cost restrictions with current therapy.   Verified current warfarin dosing schedule.   BP recorded in vitals. 127/93  Medications reconciled previously   Pt on antiplatelet therapy Aspirin 81mg after being discharged from the hospital with COVID-19 and must be reviewed again next 2021 during his follow up appointment with PCP.      A/P   INR -therapeutic at 2.7  Patient instructed to continue current dose.     Pt uses an oxygen mask. He was discharged from the hospital ~3-4 weeks ago after being diagnosed with COVID and having pneumonia complication.     Pt was seen in clinic today with the help of a Iraqi interpretor: Dejuan; ID # [id number]      Follow up appointment in 2 week(s).    Guerline Armas  PharmD

## 2021-03-19 ENCOUNTER — ANTICOAGULATION VISIT (OUTPATIENT)
Dept: VASCULAR LAB | Facility: MEDICAL CENTER | Age: 46
End: 2021-03-19
Attending: INTERNAL MEDICINE
Payer: OTHER MISCELLANEOUS

## 2021-03-19 DIAGNOSIS — I65.21 THROMBOSIS OF RIGHT COMMON CAROTID ARTERY: ICD-10-CM

## 2021-03-19 LAB — INR PPP: 2 (ref 2–3.5)

## 2021-03-19 PROCEDURE — 85610 PROTHROMBIN TIME: CPT

## 2021-03-19 PROCEDURE — 99211 OFF/OP EST MAY X REQ PHY/QHP: CPT

## 2021-03-19 NOTE — PROGRESS NOTES
Anticoagulation Summary  As of 3/19/2021    INR goal:  2.0-3.0   TTR:  75.0 % (1.1 mo)   INR used for dosin.00 (3/19/2021)   Warfarin maintenance plan:  2 mg (4 mg x 0.5) every Fri; 4 mg (4 mg x 1) all other days   Weekly warfarin total:  26 mg   Plan last modified:  Saran Garnett PharmD (2021)   Next INR check:  2021   Target end date:  2021    Indications    Thrombosis of right common carotid artery [I65.21]             Anticoagulation Episode Summary     INR check location:      Preferred lab:      Send INR reminders to:      Comments:        Anticoagulation Care Providers     Provider Role Specialty Phone number    Riley Winkler D.O. Referring Internal Medicine [phone]    [facility] Responsible  [phone]        Anticoagulation Patient Findings      HPI:  Herrera Melgar Zaida seen in clinic today for follow up on anticoagulation therapy in the presence of Thrombosis of right common carotid artery.   Denies any changes to current medical/health status since last appointment.   Denies any medication or diet changes.   No current symptoms of bleeding or thrombosis reported.    Pt on antiplatelet therapy Aspirin 81mg for Thrombosis of right common carotid artery  and must be reviewed again on 2021. Sees Dr Jarvis 21    A/P:   INR is therapeutic.   Continue current regimen.     Pt educated to contact our clinic with any changes in medications or s/s of bleeding or thrombosis. Pt is aware to seek immediate medical attention for falls, head injury or deep cuts    Follow up appointment in 2 week(s).    Jovan Wick, Pharm.D, BC-ACP, BC-ADM

## 2021-03-24 LAB — INR BLD: 2 (ref 0.9–1.2)

## 2021-04-01 ENCOUNTER — OFFICE VISIT (OUTPATIENT)
Dept: VASCULAR LAB | Facility: MEDICAL CENTER | Age: 46
End: 2021-04-01
Attending: FAMILY MEDICINE
Payer: OTHER MISCELLANEOUS

## 2021-04-01 ENCOUNTER — ANTICOAGULATION VISIT (OUTPATIENT)
Dept: VASCULAR LAB | Facility: MEDICAL CENTER | Age: 46
End: 2021-04-01
Attending: INTERNAL MEDICINE
Payer: OTHER MISCELLANEOUS

## 2021-04-01 VITALS
SYSTOLIC BLOOD PRESSURE: 147 MMHG | WEIGHT: 204.6 LBS | HEART RATE: 67 BPM | HEIGHT: 66 IN | BODY MASS INDEX: 32.88 KG/M2 | DIASTOLIC BLOOD PRESSURE: 89 MMHG

## 2021-04-01 DIAGNOSIS — I65.21 THROMBOSIS OF RIGHT COMMON CAROTID ARTERY: ICD-10-CM

## 2021-04-01 DIAGNOSIS — E78.5 DYSLIPIDEMIA: ICD-10-CM

## 2021-04-01 DIAGNOSIS — E11.9 TYPE 2 DIABETES MELLITUS WITHOUT COMPLICATION, WITHOUT LONG-TERM CURRENT USE OF INSULIN (HCC): ICD-10-CM

## 2021-04-01 DIAGNOSIS — I10 ESSENTIAL HYPERTENSION: ICD-10-CM

## 2021-04-01 LAB — INR PPP: 3 (ref 2–3.5)

## 2021-04-01 PROCEDURE — 99204 OFFICE O/P NEW MOD 45 MIN: CPT | Performed by: FAMILY MEDICINE

## 2021-04-01 PROCEDURE — 85610 PROTHROMBIN TIME: CPT

## 2021-04-01 PROCEDURE — 99212 OFFICE O/P EST SF 10 MIN: CPT

## 2021-04-01 RX ORDER — AMLODIPINE BESYLATE AND BENAZEPRIL HYDROCHLORIDE 5; 20 MG/1; MG/1
1 CAPSULE ORAL
Qty: 90 CAPSULE | Refills: 3 | Status: SHIPPED | OUTPATIENT
Start: 2021-04-01 | End: 2021-07-09

## 2021-04-01 RX ORDER — LISINOPRIL 10 MG/1
10 TABLET ORAL
COMMUNITY
Start: 2021-03-12 | End: 2021-04-01

## 2021-04-01 ASSESSMENT — ENCOUNTER SYMPTOMS
BRUISES/BLEEDS EASILY: 0
PALPITATIONS: 0
SEIZURES: 0
TREMORS: 0
MYALGIAS: 0
CHILLS: 0
NAUSEA: 0
COUGH: 0
WHEEZING: 0
VOMITING: 0
HEMOPTYSIS: 0
DIARRHEA: 0
WEAKNESS: 0
FOCAL WEAKNESS: 0
HEADACHES: 0
SHORTNESS OF BREATH: 0
CLAUDICATION: 0
ABDOMINAL PAIN: 0
DIZZINESS: 0
FEVER: 0

## 2021-04-01 ASSESSMENT — FIBROSIS 4 INDEX: FIB4 SCORE: 0.27

## 2021-04-01 NOTE — PROGRESS NOTES
Anticoagulation Summary  As of 4/1/2021    INR goal:  2.0-3.0   TTR:  82.2 % (1.5 mo)   INR used for dosing:  3.00 (4/1/2021)   Warfarin maintenance plan:  2 mg (4 mg x 0.5) every Fri; 4 mg (4 mg x 1) all other days   Weekly warfarin total:  26 mg   No change documented:  Stacie Lewis, A.P.NHowie   Plan last modified:  Saran Garnett, PharmD (2/26/2021)   Next INR check:  4/23/2021   Target end date:  5/7/2021    Indications    Thrombosis of right common carotid artery [I65.21]             Anticoagulation Episode Summary     INR check location:      Preferred lab:      Send INR reminders to:      Comments:        Anticoagulation Care Providers     Provider Role Specialty Phone number    Riley Winkler D.O. Referring Internal Medicine [phone]    [facility] Responsible  [phone]        Anticoagulation Patient Findings      HPI:  Herrera Barros seen in clinic today for follow up on anticoagulation therapy in the presence of carotid artery thrombosis.   Denies any changes to current medical/health status since last appointment.   Denies any medication or diet changes.   No current symptoms of bleeding or thrombosis reported.    Pt on antiplatelet therapy Aspirin 81mg for arterial thrombosis per Dr Jarvis 4/1/21.    A/P:   INR therapeutic. Encouraged to eat a little more greens this week as his INR is upper therapeutic.  Continue current regimen.   BP recorded in vitals.    Pt educated to contact our clinic with any changes in medications or s/s of bleeding or thrombosis. Pt is aware to seek immediate medical attention for falls, head injury or deep cuts    Follow up appointment in 3 week(s).    Next Appointment: Friday, April 23, 2021 at 8:15 am.    Stacie WOODALL

## 2021-04-01 NOTE — PROGRESS NOTES
INITIAL VASCULAR ANTICOAGULATION VISIT  Subjective:   Herrera Barros is a 45 y.o. male who presents today 21 for   Chief Complaint   Patient presents with   • Office Visit     Initially referred by Bloch, Michael J, M.D. for length of therapy (LOT) determination and management of anticoagulation in context of acute R CCA thrombosis    HPI:    R CCA thrombosis:   Presented to ED with acute onset LUE weakness, had CTA showing R CCA thrombosis, had been dx with COVID 21.  Had carotid surgery, stabilized.   Had been admitted  due to worsening sx of covid, used oxygen and steroids, had acute onset LUE weakness, numbness.  CTA showed thrombosis.  Started hep gtts, then VKA with ASA.    Current symptoms:  Mild itching at R neck scar site, occ R hand paresthesia w/o weakness or reduced ROM or  strength.  O/w feeling well.   Current antithrombotic agent: VKA, mgmt per AC clinic, with aspirin   Complications: none, tolerating well, no bleeding or bruising   Date of initiation of anticoagulation: 2021   Adherence: reports complete, no missed doses    Anticoagulation Summary  As of 3/19/2021                INR goal:  2.0-3.0   TTR:  75.0 % (1.1 mo)   INR used for dosin.00 (3/19/2021)   Warfarin maintenance plan:  2 mg (4 mg x 0.5) every Fri; 4 mg (4 mg x 1) all other days   Weekly warfarin total:  26 mg   Plan last modified:  Saran Garnett, PharmD (2021)   Next INR check:  2021   Target end date:  2021    Indications    Thrombosis of right common carotid artery [I65.21]         T2D:  No prior dx.  Had been using steroids and noted to have hyperglycemia.    No symptoms.   No current meds.    Last A1c   Lab Results   Component Value Date    HBA1C 7.5 (H) 2021      HTN:  Current HTN concerns: reports started on lisinopril from PCP and also using losartan from [place].  ADRs: No  HTN sx:  No current blurred or changed vision, chest pain, shortness of breath, headache,  nausea, dizziness/vertigo   Home BP log: not checking   24h ABPM completed: not tested   Adherence to current HTN meds: compliant all of the time     Past Medical History:   Diagnosis Date   • Arrhythmia    • Common carotid artery thrombosis, right 01/2021   • Hypertension    • Renal disorder 2020    kidney stones   • Snoring      Past Surgical History:   Procedure Laterality Date   • PB THROMBOENDARTECTMY NECK,NECK INCIS Right 1/27/2021    Procedure: THROMBECTOMY RIGHT CAROTID ARTERY;  Surgeon: Joelle Bella M.D.;  Location: SURGERY [facility];  Service: General   • ANGIOGRAM Right 1/27/2021    Procedure: ANGIOGRAM RIGHT CAROTID;  Surgeon: Joelle Bella M.D.;  Location: SURGERY [facility];  Service: General   • VENTRAL HERNIA REPAIR ROBOTIC XI  11/3/2020    Procedure: REPAIR, HERNIA, VENTRAL, ROBOT-ASSISTED, USING DA FERNIE XI- UMBILICAL WITH MESH;  Surgeon: Tamir Hall M.D.;  Location: SURGERY [facility];  Service: Gen Robotic   • PB CYSTOSCOPY,INSERT URETERAL STENT  10/12/2020    Procedure: CYSTOSCOPY, WITH URETERAL STENT INSERTION;  Surgeon: Chad Lucas M.D.;  Location: SURGERY [facility];  Service: Urology   • PB CYSTO/URETERO/PYELOSCOPY, DX  10/12/2020    Procedure: URETEROSCOPY;  Surgeon: Chad Lucas M.D.;  Location: [facility];  Service: Urology   • OTHER ORTHOPEDIC SURGERY Left 2001    knee tendon       Current Outpatient Medications:   •  amlodipine-benazepril, 1 capsule, Oral, QHS  •  benzonatate, 200 mg, Oral, TID PRN, Taking  •  tamsulosin, 0.4 mg, Oral, AFTER BREAKFAST, Taking  •  warfarin, 4 mg, Oral, DAILY AT 1800, Taking  •  aspirin EC, 81 mg, Oral, DAILY, Taking  •  atorvastatin, 80 mg, Oral, Q EVENING, Taking  •  guaiFENesin ER, 600 mg, Oral, BID PRN, Taking  •  acetaminophen, 1,000 mg, Oral, Once PRN, Taking  •  Multiple Vitamin (MULTIVITAMINS PO), 3 tablet, Oral, DAILY, Taking   No Known Allergies  Family History   Problem Relation Age of Onset   •  "Hypertension Mother    • Hypertension Father      Social History     Tobacco Use   • Smoking status: Never Smoker   • Smokeless tobacco: Never Used   Substance Use Topics   • Alcohol use: Yes     Comment: 1 per month   • Drug use: No       DIET AND EXERCISE:  Weight Change:stable   BMI Readings from Last 5 Encounters:   04/01/21 33.02 kg/m²   02/08/21 31.15 kg/m²   02/02/21 30.60 kg/m²   01/31/21 30.60 kg/m²   01/17/21 31.31 kg/m²     Diet: common adult  Exercise: no regular exercise program     Review of Systems   Constitutional: Negative for chills, fever and malaise/fatigue.   Respiratory: Negative for cough, hemoptysis, shortness of breath and wheezing.    Cardiovascular: Negative for chest pain, palpitations, claudication and leg swelling.   Gastrointestinal: Negative for abdominal pain, diarrhea, nausea and vomiting.   Musculoskeletal: Negative for joint pain and myalgias.   Skin: Negative for itching and rash.   Neurological: Negative for dizziness, tremors, focal weakness, seizures, weakness and headaches.   Endo/Heme/Allergies: Does not bruise/bleed easily.        Objective:     Vitals:    04/01/21 0834 04/01/21 0838   BP: 147/84 147/89   BP Location: Left arm Left arm   Patient Position: Sitting Sitting   BP Cuff Size: Adult Adult   Pulse: 69 67   Weight: 92.8 kg (204 lb 9.6 oz)    Height: 1.676 m (5' 6\")       BP Readings from Last 5 Encounters:   04/01/21 147/89   03/05/21 127/93   02/10/21 136/86   02/03/21 122/76   02/01/21 139/100      Body mass index is 33.02 kg/m².  Physical Exam  Vitals reviewed.   Constitutional:       General: He is not in acute distress.     Appearance: He is well-developed. He is not diaphoretic.   HENT:      Head: Normocephalic and atraumatic.   Neck:      Thyroid: No thyromegaly.      Vascular: No JVD.   Cardiovascular:      Rate and Rhythm: Normal rate and regular rhythm.      Pulses: Normal pulses.      Heart sounds: No murmur.   Pulmonary:      Effort: No respiratory " distress.      Breath sounds: Normal breath sounds. No wheezing or rales.   Musculoskeletal:         General: No swelling or tenderness.   Neurological:      General: No focal deficit present.      Mental Status: He is oriented to person, place, and time.      Cranial Nerves: No cranial nerve deficit.      Motor: Motor function is intact.      Coordination: Coordination is intact. Coordination normal.      Gait: Gait normal.   Psychiatric:         Mood and Affect: Mood normal.         Behavior: Behavior normal.         Lab Results   Component Value Date    CHOLSTRLTOT 123 2021    LDL 62 2021    HDL 39 (A) 2021    TRIGLYCERIDE 111 2021      Lab Results   Component Value Date    PROTHROMBTM 32.6 (H) 2021    INR 2.00 2021       Lab Results   Component Value Date    HBA1C 7.5 (H) 2021      Lab Results   Component Value Date    SODIUM 135 2021    POTASSIUM 4.2 2021    CHLORIDE 101 2021    CO2 24 2021    GLUCOSE 115 (H) 2021    BUN 16 2021    CREATININE 0.77 2021    IFAFRICA >60 2021    IFNOTAFR >60 2021        Lab Results   Component Value Date    WBC 11.6 (H) 2021    RBC 4.99 2021    HEMOGLOBIN 13.5 (L) 2021    HEMATOCRIT 43.1 2021    MCV 86.4 2021    MCH 27.1 2021    MCHC 31.3 (L) 2021    MPV 10.3 2021        VASCULAR IMAGING:     Last EKG:   Results for orders placed or performed during the hospital encounter of 21   EKG   Result Value Ref Range    Report       [facility] Emergency Dept.    Test Date:  2021  Pt Name:    ADDIE OHARA   Department: ER  MRN:        [id number]                      Room:        18  Gender:     Male                         Technician: [id number]  :        1975                   Requested By:MITRA VIZCAINO  Order #:    [id number]                    Reading MD: MITRA VIZCAINO,  MD    Measurements  Intervals                                Axis  Rate:       92                           P:          30  MI:         168                          QRS:        20  QRSD:       88                           T:          18  QT:         364  QTc:        451    Interpretive Statements  SINUS RHYTHM  EARLY PRECORDIAL R/S TRANSITION  Compared to ECG 01/22/2021 18:02:29  No significant changes  Electronically Signed On 2-3-2021 2:41:42 PST by MITRA VIZCAINO MD       CTA head/neck 1/26/21   1.  No large vessel occlusion or aneurysm appreciated.  1.  Low-density filling defect in the distal right common carotid artery, appearance most compatible with thrombus adherent to the wall, results in approximately 65% stenosis.  2.  Peripheral reticular opacities in the bilateral lung apices, appearance favors Covid infiltrates.    Echo 1/2021   Limited echocardiogram study, Covid 19.  No prior study is available for comparison.   Normal left ventricular systolic function.  Left ventricular ejection fraction is visually estimated to be 75%.  No significant valve abnormalities.    CTA head/neck 2/2/21   No large vessel occlusion, hemodynamically significant stenosis or aneurysm.   Hypodensity at the right frontoparietal vertex did not with recent infarct. See recent brain MRI 1/29/2021. No acute intracranial hemorrhage.   Previously seen right common carotid artery thrombus is no longer visualized with interval postoperative changes seen.   No significant stenosis of the carotid or vertebral arteries.   Extensive pulmonary opacities again noted presumably related to Covid pneumonia.    MRI brain 1/29/21   Areas of ischemia in the RIGHT frontal, parietal and occipital cortex, suspect watershed injury    OP note 1/2721 (Dr. Bella)   PROCEDURES:  1.  Right carotid thrombectomy.  2.  Needle, right carotid.  3.  Right carotid angiogram, cervical and cerebral.    Medical Decision Making:  Today's Assessment / Status /  Plan:     1. Thrombosis of right common carotid artery  LIPOPROTEIN A    US-CAROTID DOPPLER BILAT   2. Type 2 diabetes mellitus without complication, without long-term current use of insulin (HCC)  HEMOGLOBIN A1C    CBC WITHOUT DIFFERENTIAL    Comp Metabolic Panel    Lipid Profile    MICROALBUMIN CREAT RATIO URINE   3. Dyslipidemia  LIPOPROTEIN A   4. Essential hypertension  amlodipine-benazepril (LOTREL) 5-20 MG per capsule     PATIENT TYPE: Secondary Prevention    Etiology of Established CVD if Present:   1) R CCA thrombosis, s/p thrombectomy     R common carotid art thrombosis  Currently stable   S/p thrombectomy (Dr. Bella) 1/2021   There are no definitive guidelines on LOT for this type of thrombosis, so through shared MDM we opted for 6mo on VKA with low dose ASA due to arterial thrombosis.    No indications of VTE diseas.   - continue VKA through 6mo, then ongoing ASA therapy   - repeat carotid duplex now, if stable, then 1 year surveillance   - continue med mgmt     ANTITHROMBOTIC THERAPY:  Anti-Platelet/Anti-Coagulant Tx recommended: yes  Indication: R CCA thrombosis, covid associated   Date of initiation: 1/2021   HAS-BLED bleeding risk calc ([email or web address]): 1 pt, 3.4%, low risk  Thrombophilia/hypercoag evaluation:  not recommended  Factors to consider for indefinite OAC: None   Expected duration: will continue for 6mo VKA therapy due to CCA thrombosis, then transition to ASA indefinitely   Antithrombotic therapy plan:  - continue VKA through July 2021, monitor via AC clinic   - continue ASA 81mg daily for now, boost to 162mg after VKA cessation   - if any bleeding lasting 30min without stopping, please seek care with your PCP, urgent care, or ED  - reversal agents for most blood thinners are now available and used if you have major bleeding    LIPID MANAGEMENT:   Qualifies for Statin Therapy Based on 2018 ACC/AHA Guidelines: yes, Secondary prevention - <76yo, ASCVD not at very high risk  10-yr ASCVD risk  score: N/A  Major ASCVD events: Documented clinical evidence of ASCVD: and Carotid plaque, >50% stenosis  High-risk conditions: None   Risk-enhancers: N/A  Currently on Statin: Yes  Treatment goals: LDL-C <70 (consider non-HDL-C <100, apoB <80)  At goal? Yes, 1/2021   Plan:   - reinforced ongoing TLC measures as noted   - monitor labs   Meds:   - continue atorva 80mg daily    BLOOD PRESSURE MANAGEMENT:  ACC/AHA (2017) goal <130/80  Home BP at goal:  no  Office BP at goal:  no  Indications of end organ damage: Hx of CVA/TIA  Device candidate? no  Plan:   Monitoring:   - start/continue home BP monitoring, reviewed correct technique, provide BP log and instructions  - order 24h ABPM:  NO  - monitor lytes/gfr routinely   - contact office if BP consistently >140/>90 to discussion of tx adjustments   Medications:  ACEi/ARB: stop lisinopril and losartan, start benazepril 20mg (combo) daily  DHP-CCB: start amlodipine 5mg (combo) daily  Thiazide: none   Kendrick-receptor Antagonist: not indicated at this time     GLYCEMIC STATUS:  Diabetic   Unclear if steroid-induced or true T2D  Goal A1c < 7.0  Last A1c    Lab Results   Component Value Date    HBA1C 7.5 (H) 01/23/2021    UACR: not examined  Plan:  - plan to start metformin if a1c >6.5  - update labs now   - recommmend for routine care with PCP (or endocrine) to include regular A1c monitoring, annual albumin/creatinine ratio (ACR), annual diabetic retinopathy screening, foot exams, annual flu vaccine, and updates to pneumonia vaccines as appropriate     LIFESTYLE RECOMMENDATIONS:     Smoking:  reports that he has never smoked. He has never used smokeless tobacco.   - continued complete avoidance of all tobacco products     Physical Activity: continue healthy activity to improve CV fitness, see care instructions for additional details     Weight Management and Nutrition: Dietary plan was discussed with patient at this visit including DASH, low sodium and/or as outlined in care  instructions     OTHER:    1) s/p CVA, mild residual RUE paresthesia w/o weakness   Resolved, stable   Does not appear has had f/u with stroke clinic as outpatient.    - continue secondary med mgmt, monitor for new onset s/s   - continue antithrombotic therapy     2) COVID positive, stable recovered   - monitor for long-haul sx     Instructed to follow-up with PCP for remainder of adult medical needs: yes  We will partner with other providers in the management of established vascular disease and cardiometabolic risk factors.    Studies to Be Obtained: carotid duplex 7/2021 - not ordered, aprn to track   Labs to Be Obtained: as noted above     Follow up in: 4 months with me     Pablo Jarvis M.D.  [facility]   [facility]   [phone]

## 2021-04-05 LAB
ALBUMIN SERPL-MCNC: 4.6 G/DL (ref 4–5)
ALBUMIN/CREAT UR: 10 MG/G CREAT (ref 0–29)
ALBUMIN/GLOB SERPL: 1.8 {RATIO} (ref 1.2–2.2)
ALP SERPL-CCNC: 104 IU/L (ref 39–117)
ALT SERPL-CCNC: 58 IU/L (ref 0–44)
AST SERPL-CCNC: 31 IU/L (ref 0–40)
BILIRUB SERPL-MCNC: 0.4 MG/DL (ref 0–1.2)
BUN SERPL-MCNC: 9 MG/DL (ref 6–24)
BUN/CREAT SERPL: 14 (ref 9–20)
CALCIUM SERPL-MCNC: 8.7 MG/DL (ref 8.7–10.2)
CHLORIDE SERPL-SCNC: 106 MMOL/L (ref 96–106)
CHOLEST SERPL-MCNC: 141 MG/DL (ref 100–199)
CO2 SERPL-SCNC: 20 MMOL/L (ref 20–29)
CREAT SERPL-MCNC: 0.63 MG/DL (ref 0.76–1.27)
CREAT UR-MCNC: 120.3 MG/DL
ERYTHROCYTE [DISTWIDTH] IN BLOOD BY AUTOMATED COUNT: 14.5 % (ref 11.6–15.4)
GLOBULIN SER CALC-MCNC: 2.6 G/DL (ref 1.5–4.5)
GLUCOSE SERPL-MCNC: 104 MG/DL (ref 65–99)
HBA1C MFR BLD: 6.4 % (ref 4.8–5.6)
HCT VFR BLD AUTO: 45.5 % (ref 37.5–51)
HDLC SERPL-MCNC: 41 MG/DL
HGB BLD-MCNC: 14.5 G/DL (ref 13–17.7)
LABORATORY COMMENT REPORT: ABNORMAL
LDLC SERPL CALC-MCNC: 74 MG/DL (ref 0–99)
LPA SERPL-SCNC: 10.5 NMOL/L
MCH RBC QN AUTO: 27.1 PG (ref 26.6–33)
MCHC RBC AUTO-ENTMCNC: 31.9 G/DL (ref 31.5–35.7)
MCV RBC AUTO: 85 FL (ref 79–97)
MICROALBUMIN UR-MCNC: 12.2 UG/ML
NRBC BLD AUTO-RTO: NORMAL %
PLATELET # BLD AUTO: 339 X10E3/UL (ref 150–450)
POTASSIUM SERPL-SCNC: 4 MMOL/L (ref 3.5–5.2)
PROT SERPL-MCNC: 7.2 G/DL (ref 6–8.5)
RBC # BLD AUTO: 5.36 X10E6/UL (ref 4.14–5.8)
SODIUM SERPL-SCNC: 141 MMOL/L (ref 134–144)
TRIGL SERPL-MCNC: 150 MG/DL (ref 0–149)
VLDLC SERPL CALC-MCNC: 26 MG/DL (ref 5–40)
WBC # BLD AUTO: 8 X10E3/UL (ref 3.4–10.8)

## 2021-04-09 LAB — INR BLD: 3 (ref 0.9–1.2)

## 2021-04-23 ENCOUNTER — ANTICOAGULATION VISIT (OUTPATIENT)
Dept: VASCULAR LAB | Facility: MEDICAL CENTER | Age: 46
End: 2021-04-23
Attending: FAMILY MEDICINE
Payer: OTHER MISCELLANEOUS

## 2021-04-23 DIAGNOSIS — I65.21 THROMBOSIS OF RIGHT COMMON CAROTID ARTERY: ICD-10-CM

## 2021-04-23 LAB
INR BLD: 2.5 (ref 0.9–1.2)
INR PPP: 2.5 (ref 2–3.5)

## 2021-04-23 PROCEDURE — 99211 OFF/OP EST MAY X REQ PHY/QHP: CPT

## 2021-04-23 PROCEDURE — 85610 PROTHROMBIN TIME: CPT

## 2021-04-23 NOTE — PROGRESS NOTES
Anticoagulation Summary  As of 4/23/2021    INR goal:  2.0-3.0   TTR:  82.2 % (1.5 mo)   INR used for dosing:     Warfarin maintenance plan:  2 mg (4 mg x 0.5) every Fri; 4 mg (4 mg x 1) all other days   Weekly warfarin total:  26 mg   Plan last modified:  Brody MeyerD (2/26/2021)   Next INR check:     Target end date:  5/7/2021    Indications    Thrombosis of right common carotid artery [I65.21]             Anticoagulation Episode Summary     INR check location:      Preferred lab:      Send INR reminders to:      Comments:        Anticoagulation Care Providers     Provider Role Specialty Phone number    Riley Winkler D.O. Referring Internal Medicine [phone]    [facility] Responsible  [phone]                Anticoagulation Patient Findings      HPI:  Herrera Melgar Zaida seen in clinic today, on anticoagulation therapy with warfarin for carotid artery thrombosis  Changes to current medical/health status since last appt: HTN regimen was changed during last visit with Vasc MD- pt now on Amlodipine/Benazepril  Denies signs/symptoms of bleeding and/or thrombosis since the last appt.    Denies any interval changes to diet  Denies any interval changes to medications since last appt.   Denies any complications or cost restrictions with current therapy.   Verified current warfarin dosing schedule.   Pt declines vitals due to Covid transmission concerns.   Medications reconciled   Pt on antiplatelet therapy Aspirin 81mg for arterial thrombosis per Dr Jarvis and must be reviewed again on 7/9/2021 with Dr Jarvis.      A/P   INR  remains therapeutic.   Pt is to continue with current warfarin dosing regimen.      Follow up appointment in 4 week(s).    Dillon Valero, BrodyD

## 2021-05-06 ENCOUNTER — ANTICOAGULATION VISIT (OUTPATIENT)
Dept: VASCULAR LAB | Facility: MEDICAL CENTER | Age: 46
End: 2021-05-06
Attending: INTERNAL MEDICINE
Payer: OTHER MISCELLANEOUS

## 2021-05-06 DIAGNOSIS — I65.21 THROMBOSIS OF RIGHT COMMON CAROTID ARTERY: ICD-10-CM

## 2021-05-06 LAB — INR PPP: 2.9 (ref 2–3.5)

## 2021-05-06 PROCEDURE — 99211 OFF/OP EST MAY X REQ PHY/QHP: CPT | Performed by: NURSE PRACTITIONER

## 2021-05-06 PROCEDURE — 85610 PROTHROMBIN TIME: CPT

## 2021-05-06 NOTE — PROGRESS NOTES
Anticoagulation Summary  As of 2021    INR goal:  2.0-3.0   TTR:  90.0 % (2.7 mo)   INR used for dosin.90 (2021)   Warfarin maintenance plan:  2 mg (4 mg x 0.5) every Fri; 4 mg (4 mg x 1) all other days   Weekly warfarin total:  26 mg   Plan last modified:  Saran Garnett, PharmD (2021)   Next INR check:  2021   Target end date:  2021    Indications    Thrombosis of right common carotid artery [I65.21]             Anticoagulation Episode Summary     INR check location:      Preferred lab:      Send INR reminders to:      Comments:        Anticoagulation Care Providers     Provider Role Specialty Phone number    Riley Winkler D.O. Referring Internal Medicine [phone]    [facility] Responsible  [phone]        Anticoagulation Patient Findings      HPI:  Herrera Barros seen in clinic today for follow up on anticoagulation therapy in the presence of arterial thrombosis.   He saw his PCP today and reported having a small bruise to his right chest and left leg. Doesn't remember hitting anything. No other bruising and no bleeding.  Denies any medication or diet changes.   No current symptoms of thrombosis reported.    Pt on antiplatelet therapy Aspirin 81mg for arterial thrombosis and must be reviewed again on 21.    A/P:   INR therapeutic. Instructed to continue current regimen and monitor for any excessive bruising or s/sx of bleeding.     Pt educated to contact our clinic with any changes in medications or s/s of bleeding or thrombosis. Pt is aware to seek immediate medical attention for falls, head injury or deep cuts    Follow up appointment in 2 week(s).    Next Appointment: Friday, May 21, 2021 at 8:30 am.    Stacie WOODALL    Cc: Dr Mckenzie

## 2021-05-10 LAB — INR BLD: 2.9 (ref 0.9–1.2)

## 2021-05-20 ENCOUNTER — DOCUMENTATION (OUTPATIENT)
Dept: VASCULAR LAB | Facility: MEDICAL CENTER | Age: 46
End: 2021-05-20

## 2021-05-20 DIAGNOSIS — I63.9 FOCAL INFARCTION OF BRAIN (HCC): ICD-10-CM

## 2021-05-20 DIAGNOSIS — I65.21 THROMBOSIS OF RIGHT COMMON CAROTID ARTERY: ICD-10-CM

## 2021-05-21 ENCOUNTER — ANTICOAGULATION VISIT (OUTPATIENT)
Dept: VASCULAR LAB | Facility: MEDICAL CENTER | Age: 46
End: 2021-05-21
Attending: INTERNAL MEDICINE
Payer: OTHER MISCELLANEOUS

## 2021-05-21 DIAGNOSIS — I65.21 THROMBOSIS OF RIGHT COMMON CAROTID ARTERY: Primary | ICD-10-CM

## 2021-05-21 LAB — INR PPP: 2.9 (ref 2–3.5)

## 2021-05-21 PROCEDURE — 85610 PROTHROMBIN TIME: CPT

## 2021-05-21 PROCEDURE — 99211 OFF/OP EST MAY X REQ PHY/QHP: CPT

## 2021-05-21 RX ORDER — WARFARIN SODIUM 4 MG/1
4 TABLET ORAL DAILY
Qty: 30 TABLET | Refills: 3 | Status: SHIPPED | OUTPATIENT
Start: 2021-05-21 | End: 2021-07-09

## 2021-05-21 NOTE — PROGRESS NOTES
Anticoagulation Summary  As of 2021    INR goal:  2.0-3.0   TTR:  91.6 % (3.2 mo)   INR used for dosin.90 (2021)   Warfarin maintenance plan:  2 mg (4 mg x 0.5) every Fri; 4 mg (4 mg x 1) all other days   Weekly warfarin total:  26 mg   Plan last modified:  Saran Garnett PharmD (2021)   Next INR check:  2021   Target end date:  2021    Indications    Thrombosis of right common carotid artery [I65.21]             Anticoagulation Episode Summary     INR check location:      Preferred lab:      Send INR reminders to:      Comments:        Anticoagulation Care Providers     Provider Role Specialty Phone number    Riley Winkler D.O. Referring Internal Medicine [phone]    [facility] Responsible  [phone]                Anticoagulation Patient Findings      HPI:  Herrera Barros seen in clinic today, on anticoagulation therapy with warfarin for arterial thrombosis  Changes to current medical/health status since last appt: Bruise reported from last visit has improved and nearly resolved  Denies signs/symptoms of bleeding and/or thrombosis since the last appt.    Denies any interval changes to diet  Denies any interval changes to medications since last appt.   Denies any complications or cost restrictions with current therapy.   Verified current warfarin dosing schedule.   Pt declines vitals due to Covid transmission concerns.   Medications reconciled   Pt on antiplatelet therapy Aspirin 81mg for arterial thrombosis and must be reviewed again on 21.      A/P   INR  remains therapeutic.   Pt is to continue with current warfarin dosing regimen.    Pt previously had 4 week follow up with temporary closer follow up monitoring pt's bruise.       Follow up appointment in 6 week(s).    Dillon Valero, BrodyD

## 2021-05-24 LAB — INR BLD: 2.9 (ref 0.9–1.2)

## 2021-07-02 ENCOUNTER — ANTICOAGULATION VISIT (OUTPATIENT)
Dept: VASCULAR LAB | Facility: MEDICAL CENTER | Age: 46
End: 2021-07-02
Attending: INTERNAL MEDICINE
Payer: OTHER MISCELLANEOUS

## 2021-07-02 DIAGNOSIS — I65.21 THROMBOSIS OF RIGHT COMMON CAROTID ARTERY: ICD-10-CM

## 2021-07-02 LAB
INR BLD: 2.8 (ref 0.9–1.2)
INR PPP: 2.8 (ref 2–3.5)

## 2021-07-02 PROCEDURE — 85610 PROTHROMBIN TIME: CPT

## 2021-07-02 PROCEDURE — 99211 OFF/OP EST MAY X REQ PHY/QHP: CPT

## 2021-07-02 RX ORDER — AMLODIPINE AND BENAZEPRIL HYDROCHLORIDE 10; 40 MG/1; MG/1
1 CAPSULE ORAL DAILY
COMMUNITY

## 2021-07-02 NOTE — PROGRESS NOTES
Anticoagulation Summary  As of 2021    INR goal:  2.0-3.0   TTR:  94.2 % (4.6 mo)   INR used for dosin.80 (2021)   Warfarin maintenance plan:  2 mg (4 mg x 0.5) every Fri; 4 mg (4 mg x 1) all other days   Weekly warfarin total:  26 mg   Plan last modified:  Saran Garnett PharmD (2021)   Next INR check:  2021   Target end date:  2021    Indications    Thrombosis of right common carotid artery [I65.21]             Anticoagulation Episode Summary     INR check location:      Preferred lab:      Send INR reminders to:      Comments:        Anticoagulation Care Providers     Provider Role Specialty Phone number    Riley Winkler D.O. Referring Internal Medicine [phone]    [facility] Responsible  [phone]        Anticoagulation Patient Findings      HPI:  Herrera Melgar Zaida seen in clinic today for follow up on anticoagulation therapy in the presence of Thrombosis of right common carotid artery.   Denies any changes to current medical/health status since last appointment.   Denies any medication or diet changes.   No current symptoms of bleeding or thrombosis reported.  Verified dose with patient.  BP recorded in vitals.    Pt on antiplatelet therapy Aspirin 81mg for  arterial thrombosis  and must be reviewed again on 2021.    A/P:   INR is therapeutic.   Continue current regimen.     Pt educated to contact our clinic with any changes in medications or s/s of bleeding or thrombosis. Pt is aware to seek immediate medical attention for falls, head injury or deep cuts    Follow up appointment in 7 week(s).    Jovan Wcik, Pharm.D, BC-ACP, BC-ADM

## 2021-07-09 ENCOUNTER — OFFICE VISIT (OUTPATIENT)
Dept: VASCULAR LAB | Facility: MEDICAL CENTER | Age: 46
End: 2021-07-09
Attending: INTERNAL MEDICINE
Payer: OTHER MISCELLANEOUS

## 2021-07-09 VITALS
BODY MASS INDEX: 32.47 KG/M2 | HEIGHT: 66 IN | SYSTOLIC BLOOD PRESSURE: 110 MMHG | DIASTOLIC BLOOD PRESSURE: 65 MMHG | HEART RATE: 59 BPM | WEIGHT: 202 LBS

## 2021-07-09 DIAGNOSIS — E78.5 DYSLIPIDEMIA: ICD-10-CM

## 2021-07-09 DIAGNOSIS — I10 ESSENTIAL HYPERTENSION: ICD-10-CM

## 2021-07-09 DIAGNOSIS — I65.21 THROMBOSIS OF RIGHT COMMON CAROTID ARTERY: ICD-10-CM

## 2021-07-09 DIAGNOSIS — E11.9 TYPE 2 DIABETES MELLITUS WITHOUT COMPLICATION, WITHOUT LONG-TERM CURRENT USE OF INSULIN (HCC): ICD-10-CM

## 2021-07-09 DIAGNOSIS — I63.9 FOCAL INFARCTION OF BRAIN (HCC): ICD-10-CM

## 2021-07-09 PROCEDURE — 99214 OFFICE O/P EST MOD 30 MIN: CPT | Performed by: FAMILY MEDICINE

## 2021-07-09 PROCEDURE — 99212 OFFICE O/P EST SF 10 MIN: CPT

## 2021-07-09 ASSESSMENT — ENCOUNTER SYMPTOMS
ABDOMINAL PAIN: 0
HEADACHES: 0
DIARRHEA: 0
HEMOPTYSIS: 0
CLAUDICATION: 0
BRUISES/BLEEDS EASILY: 0
WHEEZING: 0
TREMORS: 0
MYALGIAS: 0
FOCAL WEAKNESS: 0
DIZZINESS: 0
FEVER: 0
WEAKNESS: 0
VOMITING: 0
PALPITATIONS: 0
SEIZURES: 0
CHILLS: 0
SHORTNESS OF BREATH: 0
COUGH: 0
NAUSEA: 0

## 2021-07-09 ASSESSMENT — FIBROSIS 4 INDEX: FIB4 SCORE: 0.56

## 2021-07-09 NOTE — PROGRESS NOTES
FOLLOW-UP VASCULAR ANTICOAGULATION VISIT  Subjective:   Herrera Barros is a 45 y.o. male who presents today 21 for   Chief Complaint   Patient presents with   • Follow-Up     Initially referred by Bloch, Michael J, M.D. for length of therapy (LOT) determination and management of anticoagulation in context of acute R CCA thrombosis    HPI:    R CCA thrombosis:   Interval hx/concerns: feeling well. No new changes since last visit   Pmhx:   Presented to ED with acute onset LUE weakness, had CTA showing R CCA thrombosis, had been dx with COVID 21.  Had carotid surgery, stabilized.   Had been admitted  due to worsening sx of covid, used oxygen and steroids, had acute onset LUE weakness, numbness.  CTA showed thrombosis.  Started hep gtts, then VKA with ASA.    Current symptoms:  None   Current antithrombotic agent: VKA, mgmt per AC clinic, with aspirin   Complications: none, tolerating well, no bleeding or bruising   Date of initiation of anticoagulation: 2021   Adherence: reports complete, no missed doses    Anticoagulation Summary  As of 3/19/2021                INR goal:  2.0-3.0   TTR:  75.0 % (1.1 mo)   INR used for dosin.00 (3/19/2021)   Warfarin maintenance plan:  2 mg (4 mg x 0.5) every Fri; 4 mg (4 mg x 1) all other days   Weekly warfarin total:  26 mg   Plan last modified:  Saran Garnett, PharmD (2021)   Next INR check:  2021   Target end date:  2021    Indications    Thrombosis of right common carotid artery [I65.21]         T2D vs preDM  No prior dx.  Had been using steroids and noted to have hyperglycemia.    No symptoms. No current meds.    Last A1c   Lab Results   Component Value Date    HBA1C 6.4 (H) 2021    HBA1C 7.5 (H) 2021      HTN:  No changes   ADRs: No  HTN sx:  No current blurred or changed vision, chest pain, shortness of breath, headache, nausea, dizziness/vertigo   Home BP los/70s   24h ABPM completed: not tested   Adherence to  "current HTN meds: compliant all of the time       Current Outpatient Medications:   •  aspirin EC, 162 mg, Oral, DAILY  •  amlodipine-benazepril, 1 capsule, Oral, DAILY, Taking  •  benzonatate, 200 mg, Oral, TID PRN, Taking  •  tamsulosin, 0.4 mg, Oral, AFTER BREAKFAST, Taking  •  atorvastatin, 80 mg, Oral, Q EVENING, Taking  •  guaiFENesin ER, 600 mg, Oral, BID PRN, Taking  •  acetaminophen, 1,000 mg, Oral, Once PRN, Taking  •  Multiple Vitamin (MULTIVITAMINS PO), 3 tablet, Oral, DAILY, Taking     Social History     Tobacco Use   • Smoking status: Never Smoker   • Smokeless tobacco: Never Used   Vaping Use   • Vaping Use: Never used   Substance Use Topics   • Alcohol use: Yes     Comment: 1 per month   • Drug use: No       DIET AND EXERCISE:  Weight Change:stable   BMI Readings from Last 5 Encounters:   07/09/21 32.60 kg/m²   04/01/21 33.02 kg/m²   02/08/21 31.15 kg/m²   02/02/21 30.60 kg/m²   01/31/21 30.60 kg/m²     Diet: common adult  Exercise: no regular exercise program     Review of Systems   Constitutional: Negative for chills, fever and malaise/fatigue.   Respiratory: Negative for cough, hemoptysis, shortness of breath and wheezing.    Cardiovascular: Negative for chest pain, palpitations, claudication and leg swelling.   Gastrointestinal: Negative for abdominal pain, diarrhea, nausea and vomiting.   Musculoskeletal: Negative for joint pain and myalgias.   Skin: Negative for itching and rash.   Neurological: Negative for dizziness, tremors, focal weakness, seizures, weakness and headaches.   Endo/Heme/Allergies: Does not bruise/bleed easily.        Objective:     Vitals:    07/09/21 0846   BP: 110/65   BP Location: Left arm   Patient Position: Sitting   BP Cuff Size: Adult   Pulse: (!) 59   Weight: 91.6 kg (202 lb)   Height: 1.676 m (5' 6\")      BP Readings from Last 5 Encounters:   07/09/21 110/65   04/01/21 147/89   03/05/21 127/93   02/10/21 136/86   02/03/21 122/76      Body mass index is 32.6 " kg/m².  Physical Exam  Vitals reviewed.   Constitutional:       General: He is not in acute distress.     Appearance: He is well-developed. He is not diaphoretic.   HENT:      Head: Normocephalic and atraumatic.   Neck:      Thyroid: No thyromegaly.      Vascular: No JVD.   Cardiovascular:      Rate and Rhythm: Normal rate and regular rhythm.      Pulses: Normal pulses.      Heart sounds: No murmur heard.     Pulmonary:      Effort: No respiratory distress.      Breath sounds: Normal breath sounds. No wheezing or rales.   Musculoskeletal:         General: No swelling or tenderness.   Neurological:      General: No focal deficit present.      Mental Status: He is oriented to person, place, and time.      Cranial Nerves: No cranial nerve deficit.      Motor: Motor function is intact.      Coordination: Coordination is intact. Coordination normal.      Gait: Gait normal.   Psychiatric:         Mood and Affect: Mood normal.         Behavior: Behavior normal.         Lab Results   Component Value Date    CHOLSTRLTOT 141 04/02/2021    CHOLSTRLTOT 123 01/23/2021    LDL 62 01/23/2021    HDL 41 04/02/2021    HDL 39 (A) 01/23/2021    TRIGLYCERIDE 150 (H) 04/02/2021    TRIGLYCERIDE 111 01/23/2021      Ref. Range 4/2/2021 05:13   Lipoprotein A Latest Ref Range: <75.0 nmol/L 10.5   Results for PERERAMAK PORTILLO ADDIE (MRN [id number]) as of 7/9/2021 12:01   Ref. Range 4/2/2021 05:13   Creatinine, Random Urine Latest Ref Range: Not Estab. mg/dL 120.3   Microalbumin, Urine Random Latest Ref Range: Not Estab. ug/mL 12.2   Microalbumin-Creatinine Latest Ref Range: 0 - 29 mg/g creat 10     Lab Results   Component Value Date    PROTHROMBTM 32.6 (H) 02/02/2021    INR 2.80 07/02/2021       Lab Results   Component Value Date    HBA1C 6.4 (H) 04/02/2021    HBA1C 7.5 (H) 01/23/2021      Lab Results   Component Value Date    SODIUM 141 04/02/2021    SODIUM 135 02/02/2021    POTASSIUM 4.0 04/02/2021    POTASSIUM 4.2 02/02/2021    CHLORIDE  106 2021    CHLORIDE 101 2021    CO2 20 2021    CO2 24 2021    GLUCOSE 104 (H) 2021    GLUCOSE 115 (H) 2021    BUN 9 2021    BUN 16 2021    CREATININE 0.63 (L) 2021    CREATININE 0.77 2021    BUNCREATRAT 14 2021    IFAFRICA 136 2021    IFAFRICA >60 2021    IFNOTAFR 117 2021    IFNOTAFR >60 2021        Lab Results   Component Value Date    WBC 8.0 2021    WBC 11.6 (H) 2021    RBC 5.36 2021    RBC 4.99 2021    HEMOGLOBIN 14.5 2021    HEMOGLOBIN 13.5 (L) 2021    HEMATOCRIT 45.5 2021    HEMATOCRIT 43.1 2021    MCV 85 2021    MCV 86.4 2021    MCH 27.1 2021    MCH 27.1 2021    MCHC 31.9 2021    MCHC 31.3 (L) 2021    MPV 10.3 2021        VASCULAR IMAGING:     Last EKG:   Results for orders placed or performed during the hospital encounter of 21   EKG   Result Value Ref Range    Report       [facility] Emergency Dept.    Test Date:  2021  Pt Name:    ADDIE OHARA   Department: ER  MRN:        [id number]                      Room:        18  Gender:     Male                         Technician: [id number]  :        [id number]                   Requested By:MITRA VIZCAINO  Order #:    [id number]                    Reading MD: MITRA VIZCAINO MD    Measurements  Intervals                                Axis  Rate:       92                           P:          30  ME:         168                          QRS:        20  QRSD:       88                           T:          18  QT:         364  QTc:        451    Interpretive Statements  SINUS RHYTHM  EARLY PRECORDIAL R/S TRANSITION  Compared to ECG 2021 18:02:29  No significant changes  Electronically Signed On 2-3-2021 2:41:42 PST by MITRA VIZCAINO MD       CTA head/neck 21   1.  No large vessel occlusion or aneurysm appreciated.  1.  Low-density  filling defect in the distal right common carotid artery, appearance most compatible with thrombus adherent to the wall, results in approximately 65% stenosis.  2.  Peripheral reticular opacities in the bilateral lung apices, appearance favors Covid infiltrates.    Echo 1/2021   Limited echocardiogram study, Covid 19.  No prior study is available for comparison.   Normal left ventricular systolic function.  Left ventricular ejection fraction is visually estimated to be 75%.  No significant valve abnormalities.    CTA head/neck 2/2/21   No large vessel occlusion, hemodynamically significant stenosis or aneurysm.   Hypodensity at the right frontoparietal vertex did not with recent infarct. See recent brain MRI 1/29/2021. No acute intracranial hemorrhage.   Previously seen right common carotid artery thrombus is no longer visualized with interval postoperative changes seen.   No significant stenosis of the carotid or vertebral arteries.   Extensive pulmonary opacities again noted presumably related to Covid pneumonia.    MRI brain 1/29/21   Areas of ischemia in the RIGHT frontal, parietal and occipital cortex, suspect watershed injury    OP note 1/2721 (Dr. Bella)   PROCEDURES:  1.  Right carotid thrombectomy.  2.  Needle, right carotid.  3.  Right carotid angiogram, cervical and cerebral.    Medical Decision Making:  Today's Assessment / Status / Plan:     1. Thrombosis of right common carotid artery     2. Focal infarction of brain (HCC)     3. Type 2 diabetes mellitus without complication, without long-term current use of insulin (HCC)     4. Dyslipidemia  Comp Metabolic Panel    Lipid Profile   5. Essential hypertension       PATIENT TYPE: Secondary Prevention    Etiology of Established CVD if Present:     1) R common carotid art thrombosis  Currently stable, had reported normal duplex at [facility] and seen by Dr. Bella (records requested)   S/p thrombectomy (Dr. Bella) 1/2021   -There are no definitive  guidelines on LOT for this type of thrombosis, so through shared MDM we opted for 6mo on VKA with low dose ASA due to arterial thrombosis (completed 7/2021).    -No indications of VTE dz (july 2021)  - med mgmt as noted below   - consider repeat surveillance carotid in 1 yr (5/2022)    2) s/p CVA, resolved, stable   Does not appear has had f/u with stroke clinic as outpatient.    - continue secondary med mgmt, monitor for new onset s/s   - continue antithrombotic therapy     ANTITHROMBOTIC THERAPY:  Anti-Platelet/Anti-Coagulant Tx recommended: yes  Indication: R CCA thrombosis, covid associated   Date of initiation: 1/2021   HAS-BLED bleeding risk calc ([email or web address]): 1 pt, 3.4%, low risk  Thrombophilia/hypercoag evaluation:  not recommended  Factors to consider for indefinite OAC: None   Expected duration: will continue for 6mo VKA therapy due to CCA thrombosis, then transition to ASA indefinitely (completed 7/2021)  Antithrombotic therapy plan:  - stop VKA, increase ASA to 162mg daily indefinitely     LIPID MANAGEMENT:   Qualifies for Statin Therapy Based on 2018 ACC/AHA Guidelines: yes, Secondary prevention - <74yo, ASCVD not at very high risk  10-yr ASCVD risk score: N/A  Major ASCVD events: Documented clinical evidence of ASCVD: and Carotid plaque, >50% stenosis  High-risk conditions: None   Risk-enhancers: N/A   Lp(a) normal   Currently on Statin: Yes  Treatment goals: LDL-C <70 (consider non-HDL-C <100, apoB <80)  At goal? LDL 74, 4/2021   Plan:   - reinforced ongoing TLC measures as noted   - monitor labs prior to next visit   Meds:   - continue atorva 80mg daily    BLOOD PRESSURE MANAGEMENT:  ACC/AHA (2017) goal <130/80  Home BP at goal:  yes  Office BP at goal:  yes  Indications of end organ damage: Hx of CVA/TIA  Device candidate? no  Plan:   Monitoring:   - start/continue home BP monitoring, reviewed correct technique, provide BP log and instructions  - order 24h ABPM:  NO  - monitor lytes/gfr routinely    - contact office if BP consistently >140/>90 to discussion of tx adjustments   Medications:  ACEi/ARB: continue benazepril 40mg (combo) daily  DHP-CCB: continue amlodipine 10mg (combo) daily  Thiazide: none   Kendrick-receptor Antagonist: not indicated at this time     GLYCEMIC STATUS:  Diabetic , possibly steroid-induced, now preDM level  High risk for chronic DM  Goal A1c < 7.0  Last A1c    Lab Results   Component Value Date    HBA1C 6.4 (H) 04/02/2021    HBA1C 7.5 (H) 01/23/2021    UACR: normal   Plan:  - plan to start metformin if a1c >6.5  - recommmend for routine care with PCP (or endocrine) to include regular A1c monitoring, annual albumin/creatinine ratio (ACR), annual diabetic retinopathy screening, foot exams, annual flu vaccine, and updates to pneumonia vaccines as appropriate     LIFESTYLE RECOMMENDATIONS:     Smoking:  reports that he has never smoked. He has never used smokeless tobacco.   - continued complete avoidance of all tobacco products     Physical Activity: continue healthy activity to improve CV fitness, see care instructions for additional details     Weight Management and Nutrition: Dietary plan was discussed with patient at this visit including DASH, low sodium and/or as outlined in care instructions     OTHER:    # COVID positive, stable recovered   - monitor for long-haul sx     Instructed to follow-up with PCP for remainder of adult medical needs: yes  We will partner with other providers in the management of established vascular disease and cardiometabolic risk factors.    Studies to Be Obtained: consider repeat carotid 5/2022 - not ordered  Labs to Be Obtained: as noted above     Follow up in: 6mo with me, sandra Jarvis M.D.  [facility]   [facility]   [phone]

## 2021-07-26 NOTE — PROGRESS NOTES
CBC showed severe thrombocytosis.  Start Aspirin in addition to warfarin since patient is at high risk for recurrent thrombosis.    Consider getting hematology consult.    I texted Dr. Winkler with my recommendation.   normal appearance , no deformities , trachea midline

## 2021-09-08 ENCOUNTER — ANTICOAGULATION MONITORING (OUTPATIENT)
Dept: VASCULAR LAB | Facility: MEDICAL CENTER | Age: 46
End: 2021-09-08

## 2021-09-08 DIAGNOSIS — I65.21 THROMBOSIS OF RIGHT COMMON CAROTID ARTERY: ICD-10-CM

## 2021-09-08 NOTE — PROGRESS NOTES
Discharged from [facility].  Pt has been transitioned to ASA    Mana Filter, Clinical Pharmacist, CDE, CACP

## 2021-12-30 ENCOUNTER — DOCUMENTATION (OUTPATIENT)
Dept: VASCULAR LAB | Facility: MEDICAL CENTER | Age: 46
End: 2021-12-30

## 2021-12-30 NOTE — PROGRESS NOTES
Called and left VM for pt to call back to get rescheduled for upcoming appt on 1/14. Provider will not be here at that time. We can reschedule to same day at 1pm or the 21st at 8:20am or just next available.

## 2022-01-03 ENCOUNTER — DOCUMENTATION (OUTPATIENT)
Dept: VASCULAR LAB | Facility: MEDICAL CENTER | Age: 47
End: 2022-01-03

## 2022-01-03 NOTE — PROGRESS NOTES
Called and left another VM for pt to call back to get rescheduled for upcoming appt on 1/14. Provider will not be here at that time. We can reschedule to same day at 1pm or the 21st at 8:20am or just next available.

## 2022-01-24 ENCOUNTER — OFFICE VISIT (OUTPATIENT)
Dept: VASCULAR LAB | Facility: MEDICAL CENTER | Age: 47
End: 2022-01-24
Attending: FAMILY MEDICINE
Payer: OTHER MISCELLANEOUS

## 2022-01-24 VITALS
HEART RATE: 62 BPM | DIASTOLIC BLOOD PRESSURE: 80 MMHG | HEIGHT: 65 IN | SYSTOLIC BLOOD PRESSURE: 130 MMHG | WEIGHT: 204 LBS | BODY MASS INDEX: 33.99 KG/M2

## 2022-01-24 DIAGNOSIS — R73.03 PREDIABETES: ICD-10-CM

## 2022-01-24 DIAGNOSIS — E78.5 DYSLIPIDEMIA: ICD-10-CM

## 2022-01-24 DIAGNOSIS — I65.21 THROMBOSIS OF RIGHT COMMON CAROTID ARTERY: ICD-10-CM

## 2022-01-24 DIAGNOSIS — I10 ESSENTIAL HYPERTENSION: ICD-10-CM

## 2022-01-24 PROCEDURE — 99212 OFFICE O/P EST SF 10 MIN: CPT

## 2022-01-24 PROCEDURE — 99214 OFFICE O/P EST MOD 30 MIN: CPT | Performed by: FAMILY MEDICINE

## 2022-01-24 RX ORDER — EZETIMIBE 10 MG/1
10 TABLET ORAL DAILY
Qty: 90 TABLET | Refills: 3 | Status: SHIPPED | OUTPATIENT
Start: 2022-01-24 | End: 2023-01-19

## 2022-01-24 ASSESSMENT — ENCOUNTER SYMPTOMS
WEAKNESS: 0
BRUISES/BLEEDS EASILY: 0
MYALGIAS: 0
NAUSEA: 0
CHILLS: 0
CLAUDICATION: 0
COUGH: 0
ORTHOPNEA: 0
FEVER: 0
PALPITATIONS: 0

## 2022-01-24 ASSESSMENT — FIBROSIS 4 INDEX: FIB4 SCORE: 0.58

## 2022-01-24 NOTE — PROGRESS NOTES
FOLLOW-UP VASCULAR ANTICOAGULATION VISIT  Subjective:   Herrera Barros is a  male who presents 20  for   Chief Complaint   Patient presents with   • Follow-Up     Initially referred by Bloch, Michael J, M.D. for length of therapy (LOT) determination and management of anticoagulation in context of acute R CCA thrombosis    Subjective       HPI:    R CCA thrombosis:   Interval hx/concerns: seen 2 weeks ago by vasc surg, reports mild pins/needles feelings when lifting R arm at area of the incision.  No new othe rsx.     Pmhx:   Presented to ED with acute onset LUE weakness, had CTA showing R CCA thrombosis, had been dx with COVID 21.  Had carotid surgery, stabilized.   Had been admitted  due to worsening sx of covid, used oxygen and steroids, had acute onset LUE weakness, numbness.  CTA showed thrombosis.  Started hep gtts, then VKA with ASA.    Current antithrombotic agent:  ASA only , s/p VKA x 6mo in 2021     T2D vs preDM  No prior dx.  Had been using steroids and noted to have hyperglycemia.    No symptoms. No current meds.    Last A1c   Lab Results   Component Value Date    HBA1C 6.4 (H) 2021    HBA1C 7.5 (H) 2021      HTN:  No changes , tolerating meds   Home BP los/70s     Hyperlipidemia:   Stable, no current concerns, tolerating meds   Current treatment: lifestyle changes  and High intensity          Current Outpatient Medications:   •  ezetimibe, 10 mg, Oral, DAILY  •  aspirin EC, 162 mg, Oral, DAILY, Taking  •  amlodipine-benazepril, 1 Capsule, Oral, DAILY, Taking  •  tamsulosin, 0.4 mg, Oral, AFTER BREAKFAST, Taking  •  atorvastatin, 80 mg, Oral, Q EVENING, Taking  •  benzonatate, 200 mg, Oral, TID PRN (Patient not taking: Reported on 2022), Not Taking  •  guaiFENesin ER, 600 mg, Oral, BID PRN (Patient not taking: Reported on 2022), Not Taking     Social History     Tobacco Use   • Smoking status: Never Smoker   • Smokeless tobacco: Never Used   Vaping  "Use   • Vaping Use: Never used   Substance Use Topics   • Alcohol use: Yes     Comment: 1 per month   • Drug use: No       DIET AND EXERCISE:  Weight Change:stable   BMI Readings from Last 5 Encounters:   01/24/22 33.95 kg/m²   07/09/21 32.60 kg/m²   04/01/21 33.02 kg/m²   02/08/21 31.15 kg/m²   02/02/21 30.60 kg/m²     Diet: common adult  Exercise: no regular exercise program     Review of Systems   Constitutional: Negative for chills and fever.   Respiratory: Negative for cough.    Cardiovascular: Negative for chest pain, palpitations, orthopnea, claudication and leg swelling.   Gastrointestinal: Negative for nausea.   Musculoskeletal: Negative for myalgias.   Neurological: Negative for weakness.   Endo/Heme/Allergies: Does not bruise/bleed easily.        Objective        Objective:     Vitals:    01/24/22 0931   BP: 130/80   BP Location: Left arm   Patient Position: Sitting   BP Cuff Size: Adult   Pulse: 62   Weight: 92.5 kg (204 lb)   Height: 1.651 m (5' 5\")      BP Readings from Last 5 Encounters:   01/24/22 130/80   07/09/21 110/65   04/01/21 147/89   03/05/21 127/93   02/10/21 136/86      Body mass index is 33.95 kg/m².  Physical Exam  Constitutional:       Appearance: Normal appearance.   HENT:      Head: Normocephalic.   Eyes:      Extraocular Movements: Extraocular movements intact.      Conjunctiva/sclera: Conjunctivae normal.   Neurological:      General: No focal deficit present.      Mental Status: He is alert and oriented to person, place, and time.   Psychiatric:         Mood and Affect: Mood normal.         Behavior: Behavior normal.         Thought Content: Thought content normal.         Lab Results   Component Value Date    CHOLSTRLTOT 141 04/02/2021    CHOLSTRLTOT 123 01/23/2021    LDL 62 01/23/2021    HDL 41 04/02/2021    HDL 39 (A) 01/23/2021    TRIGLYCERIDE 150 (H) 04/02/2021    TRIGLYCERIDE 111 01/23/2021     Lab Results   Component Value Date/Time    LIPOPROTA 10.5 04/02/2021 05:13 AM    "   No results found for: APOB    Lab Results   Component Value Date/Time    MICRALB 12.2 2021 05:13 AM        Lab Results   Component Value Date    PROTHROMBTM 32.6 (H) 2021    INR 2.80 2021       Lab Results   Component Value Date    HBA1C 6.4 (H) 2021    HBA1C 7.5 (H) 2021      Lab Results   Component Value Date    SODIUM 141 2021    SODIUM 135 2021    POTASSIUM 4.0 2021    POTASSIUM 4.2 2021    CHLORIDE 106 2021    CHLORIDE 101 2021    CO2 20 2021    CO2 24 2021    GLUCOSE 104 (H) 2021    GLUCOSE 115 (H) 2021    BUN 9 2021    BUN 16 2021    CREATININE 0.63 (L) 2021    CREATININE 0.77 2021    BUNCREATRAT 14 2021    IFAFRICA 136 2021    IFAFRICA >60 2021    IFNOTAFR 117 2021    IFNOTAFR >60 2021        Lab Results   Component Value Date    WBC 8.0 2021    WBC 11.6 (H) 2021    RBC 5.36 2021    RBC 4.99 2021    HEMOGLOBIN 14.5 2021    HEMOGLOBIN 13.5 (L) 2021    HEMATOCRIT 45.5 2021    HEMATOCRIT 43.1 2021    MCV 85 2021    MCV 86.4 2021    MCH 27.1 2021    MCH 27.1 2021    MCHC 31.9 2021    MCHC 31.3 (L) 2021    MPV 10.3 2021      LC lab (22)  CMP:  Gluc 128, o/w wnl     LDL 73   TG 69   UACR wnl   TSH wnl     VASCULAR IMAGING:     Last EKG:   Results for orders placed or performed during the hospital encounter of 21   EKG   Result Value Ref Range    Report       [facility] Emergency Dept.    Test Date:  2021  Pt Name:    ADDIE OHARA   Department: ER  MRN:        [id number]                      Room:        18  Gender:     Male                         Technician: [id number]  :        1975                   Requested By:MITRA VIZCAINO  Order #:    [id number]                    Reading MD: MITRA VIZCAINO,  MD    Measurements  Intervals                                Axis  Rate:       92                           P:          30  AR:         168                          QRS:        20  QRSD:       88                           T:          18  QT:         364  QTc:        451    Interpretive Statements  SINUS RHYTHM  EARLY PRECORDIAL R/S TRANSITION  Compared to ECG 01/22/2021 18:02:29  No significant changes  Electronically Signed On 2-3-2021 2:41:42 PST by MITRA VIZCAINO MD       CTA head/neck 1/26/21   1.  No large vessel occlusion or aneurysm appreciated.  1.  Low-density filling defect in the distal right common carotid artery, appearance most compatible with thrombus adherent to the wall, results in approximately 65% stenosis.  2.  Peripheral reticular opacities in the bilateral lung apices, appearance favors Covid infiltrates.    Echo 1/2021   Limited echocardiogram study, Covid 19.  No prior study is available for comparison.   Normal left ventricular systolic function.  Left ventricular ejection fraction is visually estimated to be 75%.  No significant valve abnormalities.    CTA head/neck 2/2/21   No large vessel occlusion, hemodynamically significant stenosis or aneurysm.   Hypodensity at the right frontoparietal vertex did not with recent infarct. See recent brain MRI 1/29/2021. No acute intracranial hemorrhage.   Previously seen right common carotid artery thrombus is no longer visualized with interval postoperative changes seen.   No significant stenosis of the carotid or vertebral arteries.   Extensive pulmonary opacities again noted presumably related to Covid pneumonia.    MRI brain 1/29/21   Areas of ischemia in the RIGHT frontal, parietal and occipital cortex, suspect watershed injury    OP note 1/2721 (Dr. Bella)   1.  Right carotid thrombectomy.  2.  Needle, right carotid.  3.  Right carotid angiogram, cervical and cerebral.         Medical Decision Making:  Today's Assessment / Status / Plan:     1.  Thrombosis of right common carotid artery     2. Focal infarction of brain (HCC)     3. Prediabetes  HEMOGLOBIN A1C    CBC WITHOUT DIFFERENTIAL    Comp Metabolic Panel    Lipid Profile    MICROALBUMIN CREAT RATIO URINE   4. Dyslipidemia  ezetimibe (ZETIA) 10 MG Tab    HEMOGLOBIN A1C    CBC WITHOUT DIFFERENTIAL    Comp Metabolic Panel    Lipid Profile    MICROALBUMIN CREAT RATIO URINE   5. Essential hypertension       PATIENT TYPE: Secondary Prevention    Etiology of Established CVD if Present:     1) R common carotid art thrombosis, S/p thrombectomy (Dr. Bella) 1/2021   - stable, no new sx except for presumed scar tissue related incisional pain, seen by surg and told normal healing  -There are no definitive guidelines on LOT for this type of thrombosis, so through shared MDM we opted for 6mo on VKA with low dose ASA due to arterial thrombosis (completed 7/2021).    -No indications of VTE dz (july 2021)  - med mgmt as noted below   - repeat surveillance carotid (May 2022) - if normal, then unlikely needs ongoing surveillance unless new sx   - requested records from Dr. Bella's office     2) s/p CVA, resolved, stable no current sx   Does not appear has had f/u with stroke clinic as outpatient.    - continue secondary med mgmt, monitor for new onset s/s     ANTITHROMBOTIC THERAPY:  Anti-Platelet/Anti-Coagulant Tx recommended: yes  Indication: R CCA thrombosis, covid associated   Date of initiation: 1/2021   HAS-BLED bleeding risk calc ([email or web address]): 1 pt, 3.4%, low risk  Thrombophilia/hypercoag evaluation:  not recommended  Factors to consider for indefinite OAC: None   Expected duration: will continue for 6mo VKA therapy due to CCA thrombosis, then transition to ASA indefinitely (completed 7/2021)  Antithrombotic therapy plan:  - continue ASA 162mg daily indefinitely     LIPID MANAGEMENT:   Qualifies for Statin Therapy Based on 2018 ACC/AHA Guidelines: yes, Secondary prevention - <74yo, ASCVD not at very high  risk  10-yr ASCVD risk score: N/A  Major ASCVD events: Documented clinical evidence of ASCVD: and Carotid plaque, >50% stenosis  High-risk conditions: None   Risk-enhancers: N/A   Lp(a) normal   Currently on Statin: Yes  Treatment goals: LDL-C <70 (consider non-HDL-C <100, apoB <80)  At goal? 7  Plan:   - reinforced ongoing TLC measures as noted   - monitor labs prior to next visit   Meds:   - continue atorva 80mg daily  - start zetia 10mg vo     BLOOD PRESSURE MANAGEMENT:  ACC/AHA (2017) goal <130/80  Home BP at goal:  yes  Office BP at goal:  yes  Indications of end organ damage: Hx of CVA/TIA  Device candidate? no  Plan:   Monitoring:   - start/continue home BP monitoring, reviewed correct technique, provide BP log and instructions  - order 24h ABPM:  NO  - monitor lytes/gfr routinely   - contact office if BP consistently >140/>90 to discussion of tx adjustments   Medications:  ACEi/ARB: continue benazepril 40mg (combo) daily  DHP-CCB: continue amlodipine 10mg (combo) daily  Thiazide: none   Kendrick-receptor Antagonist: not indicated at this time     GLYCEMIC STATUS:  Prediabetic , a1c preDM level, has gluc 128   High risk for chronic DM  Goal A1c < 7.0  Last A1c    Lab Results   Component Value Date    HBA1C 6.4 (H) 04/02/2021    HBA1C 7.5 (H) 01/23/2021    UACR: normal   Plan:  - plan to start metformin if a1c >6.5 at next lab check   - recommmend for routine care with PCP (or endocrine) to include regular A1c monitoring, annual albumin/creatinine ratio (ACR), annual diabetic retinopathy screening, foot exams, annual flu vaccine, and updates to pneumonia vaccines as appropriate     LIFESTYLE RECOMMENDATIONS:     Smoking:  reports that he has never smoked. He has never used smokeless tobacco.   - continued complete avoidance of all tobacco products     Physical Activity: continue healthy activity to improve CV fitness, see care instructions for additional details     Weight Management and Nutrition: Dietary plan  was discussed with patient at this visit including DASH, low sodium and/or as outlined in care instructions     OTHER:  None     Instructed to follow-up with PCP for remainder of adult medical needs: yes  We will partner with other providers in the management of established vascular disease and cardiometabolic risk factors.    Studies to Be Obtained: carotid duplex 5/2022 - not ordered, aprn to track   Labs to Be Obtained: as noted above     Follow up in:  May , sooner prn     Pablo Jarvis M.D.  [facility]   [facility]   [phone]

## 2022-01-31 ENCOUNTER — DOCUMENTATION (OUTPATIENT)
Dept: VASCULAR LAB | Facility: MEDICAL CENTER | Age: 47
End: 2022-01-31

## 2022-01-31 NOTE — PROGRESS NOTES
[facility]    Spoke with Pao Castillo (ph [phone]) with [facility] and [employer], the patients insurance, and no PA is needed to see the pt.  Pt may be seen at our clinic.    Pablo Gallegos, PharmD

## 2022-03-10 ENCOUNTER — DOCUMENTATION (OUTPATIENT)
Dept: VASCULAR LAB | Facility: MEDICAL CENTER | Age: 47
End: 2022-03-10
Payer: OTHER MISCELLANEOUS

## 2022-03-10 DIAGNOSIS — I65.21 THROMBOSIS OF RIGHT COMMON CAROTID ARTERY: ICD-10-CM

## 2022-05-11 ENCOUNTER — TELEPHONE (OUTPATIENT)
Dept: VASCULAR LAB | Facility: MEDICAL CENTER | Age: 47
End: 2022-05-11
Payer: OTHER MISCELLANEOUS

## 2022-05-11 ENCOUNTER — TELEPHONE (OUTPATIENT)
Dept: VASCULAR LAB | Facility: MEDICAL CENTER | Age: 47
End: 2022-05-11

## 2022-05-11 NOTE — TELEPHONE ENCOUNTER
Relayed this information to the patient through an  service on the phone. Patient expressed understanding and will contact Dr. Bella.     ----- Message from Pablo Jarvis M.D. sent at 5/11/2022  9:51 AM PDT -----  Regarding: RE: OON Patient  Recommend f/u with Dr. Bella his vasc surg for ongoing care.     ----- Message -----  From: Ezequiel Gamboa, Derick Ass't  Sent: 5/11/2022   8:43 AM PDT  To: Pablo Gallegos, PharmD, Pablo Jarvis M.D., #  Subject: OON Patient                                      Hello,    This patient called today to cancel their follow up and stated they cannot keep paying OON costs due to their insurance. Looks like per the most recent note from Pablo Gallegos, the patient can be seen at our clinic; however, they have to pay out of pocket each time. They had asked if we had a recommendation for a different provider/hospital, otherwise they will only be able to come in when they have the money, which they said they're unsure will be.

## 2022-05-11 NOTE — TELEPHONE ENCOUNTER
This patient called today to cancel their follow up and stated they cannot keep paying OON costs due to their insurance. Looks like per the most recent note from Pablo Gallegos, the patient can be seen at our clinic; however, they have to pay out of pocket each time. They had asked if we had a recommendation for a different provider/hospital, otherwise they will only be able to come in when they have the money, which they said they're unsure will be. Information sent to Dr. Bloch, Dr. Jarvis, and Pablo Gallegos for further advisement.

## 2022-05-13 ENCOUNTER — APPOINTMENT (OUTPATIENT)
Dept: VASCULAR LAB | Facility: MEDICAL CENTER | Age: 47
End: 2022-05-13
Payer: OTHER MISCELLANEOUS

## 2023-01-31 NOTE — DISCHARGE INSTRUCTIONS
DR. Toribio DISCHARGE INSTRUCTIONS FOLLOWING   URETEROSCOPY AND LASER LITHOTRIPSY      DIET:  You can resume your regular diet. We encourage you to eat well-balanced and nutritious meals.      ACTIVITY:  Please restrain from strenuous activity or heavy lifting (more than 20 pounds) for the next week.  Please walk daily as much as tolerated, making exercise a part of your daily life. Do not drive while using narcotics for pain control. You may resumes showering and bathing without any restrictions.     WOUND CARE:  1. You have no dressing or open wounds to manage.   2. You do have a internal ureteral stent on strings.  Please do not pull these strings as they will be used at your follow up visit to remove the stent.          MEDICATIONS:  1. Please use an over the counter antiinflammatory (ie Ibuprofen, naproxen, Ketoralac) and/or Tylenol for pain control as needed.  2. You may take 3 days of pyridium as prescribed for numbing the bladder and burning with urination.  3a. You have been prescribed oxybutynin prn to help with bladder spasms or burning with urination. Please hold this if having difficulty urinating however.   3b. If you have severe pain refractory to Ibuprofen you may use oxycodone for pain control as well as prescribed. If taking a narcotic, please use a stool softener like miralax or colace to prevent constipation.  3c. Please use flomax daily as prescribed while you have a stent in place to lessen stone pain.   4. If you are using aspirin, Plavix, or coumadin, please don’t restart these medications until two days after your discharge if you are not having large amounts of blood in the urine.    FOLLOW-UP:  We will call you to schedule your follow up appointment in about 7 days with Dr. Toribio's physician assistant for stent removal on strings and discussion of stone diet. You will have f/u with  in approximately 8 weeks with renal ultrasound and possibly metabolic work up if you've had recurrent  Please notify patient that her cholesterol results are at goal:  LDL (bad) cholesterol:all normal  HDL (good) cholesterol: all normal  continue Simvastatin and a heart healthy diet and exercise for 50 minutes at least 4-5x per week    Thyroid/Glucose/liver/kidneys are all normal  Please notify patient of results  Follow up 2/2023  Gillian Martin MD     stones. If you have not heard from us in 1-2 business days, please call [phone] to schedule your follow-up appointment. You may also contact this number if you have questions or concerns that can be answered by Dr. Abel' staff.      WARNING SIGNS:  Fever greater than 101 degrees Fahrenheit, chills, nausea or vomiting, Large amount of clots in urine that make it difficult to urinate or for urine to drain from , increasing pain, or abdominal swelling. If you are experiencing these symptoms, call the Urology Clinic or go to your local PCP or emergency room.    It is normal to see blood in your urine for up to 2 weeks even from surgery. The urine may clear up entirely, and then turn bloody again a few days later depending on your activity level; do not be alarmed. However, if you experience severe pain or tenderness, have a lot of increased bleeding, or find that you are unable to urinate because of large clots, please notify your doctor immediately     [email or web address]         Ric Toribio M.D.   [address] [facility]  [place] [address]   [phone]     Discharge Instructions    Discharged to home by car with relative. Discharged via wheelchair, hospital escort: Yes.  Special equipment needed: Not Applicable    Be sure to schedule a follow-up appointment with your primary care doctor or any specialists as instructed.     Discharge Plan:   Influenza Vaccine Indication: Patient Refuses    I understand that a diet low in cholesterol, fat, and sodium is recommended for good health. Unless I have been given specific instructions below for another diet, I accept this instruction as my diet prescription.   Other diet: Regular    Special Instructions: None    · Is patient discharged on Warfarin / Coumadin?   No     Depression / Suicide Risk    As you are discharged from this [facility], it is important to learn how to keep safe from harming yourself.    Recognize the warning signs:  · Abrupt changes in  personality, positive or negative- including increase in energy   · Giving away possessions  · Change in eating patterns- significant weight changes-  positive or negative  · Change in sleeping patterns- unable to sleep or sleeping all the time   · Unwillingness or inability to communicate  · Depression  · Unusual sadness, discouragement and loneliness  · Talk of wanting to die  · Neglect of personal appearance   · Rebelliousness- reckless behavior  · Withdrawal from people/activities they love  · Confusion- inability to concentrate     If you or a loved one observes any of these behaviors or has concerns about self-harm, here's what you can do:  · Talk about it- your feelings and reasons for harming yourself  · Remove any means that you might use to hurt yourself (examples: pills, rope, extension cords, firearm)  · Get professional help from the community (Mental Health, Substance Abuse, psychological counseling)  · Do not be alone:Call your Safe Contact- someone whom you trust who will be there for you.  · Call your local CRISIS HOTLINE 925-8659 or 606-887-1255  · Call your local Children's Mobile Crisis Response Team [place] (499) 701-4823 or www.VoluBill  · Call the toll free National Suicide Prevention Hotlines   · National Suicide Prevention Lifeline 542-886-HQSG (4606)  · National Hope Line Network 800-SUICIDE (984-2935)

## 2023-02-03 NOTE — PROGRESS NOTES
Hospital Medicine Daily Progress Note    Date of Service  1/28/2021    Chief Complaint  45 y.o. male admitted 1/22/2021 with SOB, cough    Hospital Course  A 45-year-old man with h/o HTN, BPH, snoring, obesity, recently diagnosed COVID 19 infection (1/17/21) presented with worsening SOB, cough for one week.  Saturation was 74% on room air, improved to 97% on 6 L NC oxygen. Leukocytosis  13.1. CXR showed diffuse interstitial infiltrates concerning for COVID 19 infection.  Blood culture negative so far. CTA neck low-density filling defect in the distal right common carotid artery, appearance most compatible with thrombus adherent to the wall, results in approximately 65% stenosis.    Interval Problem Update  Patient was seen and examined at bedside.  No acute events overnight. Patient is resting comfortably in bed and in no acute distress. No upper extremity weakness, numbness, tingling.      Decadron day 6/10  Lasix 20mg daily  5L nasal cannula  Heparin infusion   S/p thrombectomy    Consultants/Specialty  Vascular    Code Status  Full Code    Disposition  TBD    Review of Systems  Review of Systems   Constitutional: Positive for malaise/fatigue. Negative for chills and fever.   HENT: Negative.    Eyes: Negative.    Respiratory: Positive for cough and shortness of breath.    Cardiovascular: Negative for chest pain.   Gastrointestinal: Negative for abdominal pain, nausea and vomiting.   Genitourinary: Negative for dysuria.   Musculoskeletal: Negative for myalgias.   Skin: Negative for rash.   Neurological: Positive for weakness. Negative for headaches.     Physical Exam  Temp:  [36.2 °C (97.1 °F)-37.3 °C (99.1 °F)] 36.6 °C (97.8 °F)  Pulse:  [] 85  Resp:  [18-40] 18  BP: (120-150)/(73-93) 130/78  SpO2:  [91 %-97 %] 95 %    Physical Exam  Vitals signs and nursing note reviewed.   Constitutional:       Appearance: He is obese. He is ill-appearing.   HENT:      Head: Normocephalic.      Mouth/Throat: no erythema,  no confestion     Mouth: Mucous membranes are moist.      Pharynx: Oropharynx is clear.   Eyes:      Pupils: Pupils are equal, round, and reactive to light.   Neck:      Musculoskeletal: Normal range of motion.   Cardiovascular:      Rate and Rhythm: Normal rate and regular rhythm.      Heart sounds: Normal heart sounds.   Pulmonary:      Effort: Pulmonary effort is normal.      Breath sounds: faint crackles auscultated in lower lung bases bilaterally  Abdominal:      General: Abdomen is flat. Bowel sounds are normal.      Tenderness: There is no abdominal tenderness.   Musculoskeletal: Normal range of motion.   Skin:     General: Skin is warm.   Neurological:      General: No focal deficit present.      Mental Status: He is alert and oriented to person, place, and time.     Fluids    Intake/Output Summary (Last 24 hours) at 1/28/2021 1412  Last data filed at 1/28/2021 0800  Gross per 24 hour   Intake 800 ml   Output 625 ml   Net 175 ml       Laboratory  Recent Labs     01/26/21  0446 01/27/21  0802 01/28/21  0708   WBC 15.5* 12.7* 12.6*   RBC 4.81 5.01 4.54*   HEMOGLOBIN 13.3* 13.8* 12.7*   HEMATOCRIT 41.3* 42.8 39.2*   MCV 85.9 85.4 86.3   MCH 27.7 27.5 28.0   MCHC 32.2* 32.2* 32.4*   RDW 45.3 43.8 45.1   PLATELETCT 588* 733* 778*   MPV 10.0 9.9 9.6     Recent Labs     01/26/21  0446 01/27/21  0802 01/28/21  0708   SODIUM 134* 132* 141   POTASSIUM 3.7 4.1 4.4   CHLORIDE 99 98 103   CO2 23 17* 24   GLUCOSE 97 85 111*   BUN 15 17 15   CREATININE 0.63 0.69 0.73   CALCIUM 8.3* 9.0 8.6     Recent Labs     01/27/21  0802 01/27/21  1956   APTT 28.4 35.4   INR 1.07  --                Imaging  EC-ECHOCARDIOGRAM LTD W/O CONT   Final Result      DX-PORTABLE FLUOROSCOPY < 1 HOUR   Final Result      Portable fluoroscopy utilized for 15 seconds.         INTERPRETING LOCATION: [address] [place] [address], [id number]      CT-CTA HEAD WITH & W/O-POST PROCESS   Final Result         1.  No large vessel occlusion or aneurysm appreciated.       CT-CTA NECK WITH & W/O-POST PROCESSING   Final Result         1.  Low-density filling defect in the distal right common carotid artery, appearance most compatible with thrombus adherent to the wall, results in approximately 65% stenosis.   2.  Peripheral reticular opacities in the bilateral lung apices, appearance favors Covid infiltrates.      These findings were discussed with the patient's clinician, Jeremy Townsend, on 1/26/2021 10:20 PM.         DX-CHEST-PORTABLE (1 VIEW)   Final Result      Multifocal bilateral pneumonia.      MR-BRAIN-W/O    (Results Pending)        Assessment/Plan  * Acute hypoxemic respiratory failure due to COVID-19 (HCC)- (present on admission)  Assessment & Plan  COVID positive 1/17/21  Contact, droplet, eye precaution  Oxygen per protocol  Decadron day 4/10  Lasix 20mg daily  Trend inflammatory markers  5L nasal cannula    Thrombosis of right common carotid artery  Assessment & Plan  As identified on CTA neck: Low-density filling defect in the distal right common carotid artery, appearance most compatible with thrombus adherent to the wall, results in approximately 65% stenosis.    - Heparin infusion  - s/p thrombectomy  - bridge to coumadin per vascular  - 2d echo: no thrombus noted, EF 75%  - await MRI brain    Elevated glucose- (present on admission)  Assessment & Plan  Hemoglobin A1c 7.5  Newly diagnosed DM  Diet consistent carbohydrates  POCT glucose  Sliding scale insulin  Diabetic education    HTN (hypertension)- (present on admission)  Assessment & Plan  Controlled  Continue home losartan.    Numbness and tingling of right leg- (present on admission)  Assessment & Plan  Possibly due neuropathy  Consider gabapentin if persistent  D-dimer 1.21    Obesity (BMI 30.0-34.9)- (present on admission)  Assessment & Plan  BMI 34.   healthy lifestyle changes when stable     VTE prophylaxis: heparin gtt   Sandhya Carrero

## 2023-02-14 NOTE — PROGRESS NOTES
Patient: Bhavesh Bishop Date: 2/15/2023   : 1954 Attending: Bentley Nguyen MD   68 year old male Room: /A     Chief Complaint: Cardiomyopathy   Post-op Day #: 1  Surgical Procedure:   Redo sternotomy, LVAD explant, AICD explant, OHT    Subjective: Moderate incisional/CT pain. Nausea earlier, now improved. Up in chair this AM.    Vital Last Value 24 Hour Range   Temperature 98.8 °F (37.1 °C) (02/15/23 0902) Temp  Min: 96.8 °F (36 °C)  Max: 99.5 °F (37.5 °C)   Pulse (!) 108 (02/15/23 0902) Pulse  Min: 86  Max: 109   Respiratory Rate (!) 26 (02/15/23 0902) Resp  Min: 16  Max: 35   Non-Invasive   Blood Pressure 104/53 (02/15/23 0923) BP  Min: 77/52  Max: 113/56   Arterial  Blood Pressure 104/52 (02/15/23 0902) Arterial Line BP  Min: 0/0  Max: 134/52   Pulse Oximetry 98 % (02/15/23 0902) SpO2  Min: 96 %  Max: 100 %     Oxygen: 4L NC; Uyen @ 40PPM    Hemodynamics:      Last Value 24 Hour Range   CVP (!) 11 mmHg (02/15/23 0902) CVP (mmHg)  Min: 0 mmHg  Max: 13 mmHg   PAS/PAD (!) 27/14 (02/15/23 0902) PAP (mmHg)  Min: 15/4  Max: 30/12   CO 6 l/min (02/15/23 0902) Cardiac Output (l/min)  Min: 5.2 l/min  Max: 8.6 l/min   CI 2.9 l/min/m2 (02/15/23 0902) Cardiac Index (l/min/m2)  Min: 2.5 l/min/m2  Max: 4.1 l/min/m2   SV02 (!) 52 % (02/15/23 0902) SVO2  Min: 0 %  Max: 78 %   .67 (dyne*sec)/cm5 (02/15/23 0902)   SVR (dyne*sec)/cm5 (calculated)  Min: 0 (dyne*sec)/cm5  Max: 1032.26 (dyne*sec)/cm5       Weight over the past 48 Hours:   Patient Vitals for the past 48 hrs:   Weight   23 1800 94.4 kg (208 lb 1.8 oz)   23 0448 93.1 kg (205 lb 4 oz)   02/15/23 0449 97.1 kg (214 lb 1.1 oz)      Admit Weight:   Weight: 94.4 kg (208 lb 1.8 oz) (23 1800)  BMI:   BMI (Calculated): 31.64 (23 1800)    Intake/Output:    Last Stool Occurrence:      I/O this shift:  In: -   Out: 140 [Urine:70; Chest Tube:70]    I/O last 3 completed shifts:  In: 8214.7 [I.V.:5254.8; Blood:1407; IV  12 hour chart check     Piggyback:1552.9]  Out: 5205 [Urine:2660; Other:1400; Chest Tube:1145]      Intake/Output Summary (Last 24 hours) at 2/15/2023 0956  Last data filed at 2/15/2023 0900  Gross per 24 hour   Intake 6557.72 ml   Output 3195 ml   Net 3362.72 ml       Rhythm: Atrial pacing @ 110; underlying SR in 80s    Physical Exam:   Heart: Regular rhythm, tachycardic, +rub  Lungs: Diminished breath sounds  bibasilar  Chest Tube: 1 to -20cm suction, Serosanguinous drainage and No air leak noted and 2 to -20cm suction, Serosanguinous drainage and intermittent air leak  Abdomen: Soft, non-tender, hypoactive bowel tones  Skin: Skin color, texture, turgor normal. No rashes or lesions  Incision(s): Sternal DDI, Left chest DDI and prior driveline site DDI  Neurologic: Grossly normal, Alert and oriented to person, place and time and Full/equal strength in all extremities  Extremities: edema generalized, warm and pulses DP +1 bilaterally    Laboratory Results:    Recent Labs   Lab 02/15/23  0419 02/14/23  1734 02/14/23  1045 02/13/23  2158 02/13/23  1922 02/10/23  1156 02/10/23  1155   SODIUM 142 144  --   --  140  --  136   POTASSIUM 4.8  4.7  --  3.4  --  4.3  --  4.2   CHLORIDE 114*  --   --   --  110  --  107   CO2 23  --   --   --  24  --  27   BUN 13  --   --   --  16  --  14   CREATININE 0.91  --   --   --  1.25*  --  1.20*   GLUCOSE 91  --   --   --  91  --  102*   MG 2.3  --  2.5*  --  2.0  --   --    WBC 10.0  --  14.7*  --  5.2   < >  --    HGB 7.7*  --  10.2*  --  12.4*   < >  --    HCT 23.0*  --  31.1*  --  38.0*   < >  --    *  --  214  --  205   < >  --    PT 13.0*  --  13.6*  --  22.4*  --  23.2*   INR 1.3  --  1.3  --  2.3  --  2.3   PTT 27  --  24 32*  --   --   --    BILIRUBIN 0.5 0.9  --   --  0.5  --   --    ALKPT 40* 37*  --   --  102  --   --    AST 77* 78*  --   --  26  --   --    GPT 22 23  --   --  25  --   --     < > = values in this interval not displayed.       AB    Recent Labs   Lab 02/15/23  0701   APH  7.40   APO2 89   AHCO3 22   ASAT 98         Cultures: Reviewed    Chest X-Ray: Reviewed    Medications/Infusions:  Scheduled:   • lidocaine (LIDOCARE) 4 % patch 2 patch Transdermal Daily   • insulin lispro (ADMELOG, HumaLOG) injection 8 Units Subcutaneous TID PC   • acetaminophen (TYLENOL) tablet 650 mg Oral 4 times per day   • famotidine (PEPCID) injection 20 mg Intravenous Daily   • docusate sodium-sennosides (SENOKOT S) 50-8.6 MG 2 tablet Oral Daily   • [START ON 2/16/2023] bisacodyl (DULCOLAX) suppository 10 mg Rectal Once   • [START ON 2/17/2023] sodium biphosphate (FLEET) enema Rectal Once   • [START ON 2/18/2023] polyethylene glycol (MIRALAX) packet 17 g Oral BID   • valGANciclovir (VALCYTE) tablet 450 mg Oral Daily Tx   • sulfamethoxazole-trimethoprim (BACTRIM DS) 800-160 MG tablet 1 tablet Oral Once per day on Mon Wed Fri   • nystatin (MYCOSTATIN) 543799 UNIT/ML suspension 500,000 Units Swish & Swallow HS   • mycophenolate (CELLCEPT) 1,000 mg in dextrose 5 % 140 mL IVPB Intravenous BIDTX    And   • dextrose 5 % flush IVPB 40 mL Intravenous BIDTX       Continuous Infusions:   Current Facility-Administered Medications   Medication Dose Route Frequency Provider Last Rate Last Admin   • DOBUTamine (DOBUTREX) 500 mg/250 mL dextrose 5 % infusion  2-4 mcg/kg/min (Dosing Weight) Intravenous Continuous SHIMA [place]       • vasopressin (VASOSTRICT) 20 unit/100 mL dextrose 5 % infusion  0-0.1 Units/min Intravenous Continuous Kyle Austin MD   Stopped at 02/15/23 0923   • EPINEPHrine (ADRENALIN) 4 mg/250 mL in dextrose 5 % infusion  0-4 mcg/min Intravenous Continuous Kyle Austin MD       • NORepinephrine (LEVOPHED) 8 mg/250 mL in dextrose 5 % infusion  0-15 mcg/min Intravenous Continuous Bentley Nguyen MD   Stopped at 02/14/23 2130   • nitroGLYCERIN 50 mg in dextrose 5% 250 mL infusion  0-150 mcg/min Intravenous Continuous DELTA Claudio       • niCARdipine (CARDENE) 40 mg/200 mL in NaCl infusion   0-15 mg/hr Intravenous Continuous DELTA Claudio       • dextrose 5 % / sodium chloride 0.45% with KCl 40 mEq infusion   Intravenous Continuous PRN DELTA Claudio       • dextrose 5 % / sodium chloride 0.45% infusion   Intravenous Continuous PRN DELTA Claudio 70 mL/hr at 02/15/23 0800 Maintain Infusion Rate/Dose at 02/15/23 0800   • dextrose 5 % / sodium chloride 0.45% infusion   Intravenous Continuous PRN DELTA Claudio       • dextrose 5 % / sodium chloride 0.45% infusion   Intravenous Continuous PRN DELTA Claudio       • sodium chloride 0.9% infusion   Intravenous Continuous PRN DELTA Claudio       • sodium chloride 0.9% infusion   Intravenous Continuous PRN DELTA Claudio       • sodium chloride 0.9% infusion   Intravenous Continuous PRN DELTA Claudio       • isoproterenol (ISUPREL) 1 mg/250 mL in sodium chloride 0.9 % infusion  2 mcg/min Intravenous Continuous DELTA Claudio 30 mL/hr at 02/15/23 0800 2 mcg/min at 02/15/23 0800   • insulin regular (human) (HumuLIN R) 100 units in sodium chloride 0.9% 100 mL infusion  0-117 Units/hr Intravenous Continuous DELTA Claudio 6.8 mL/hr at 02/15/23 0954 6.8 Units/hr at 02/15/23 0954   • dextrose 5 % / sodium chloride 0.45% infusion   Intravenous Continuous DELTA Claudio 20 mL/hr at 02/15/23 0800 Maintain Infusion Rate/Dose at 02/15/23 0800       CMS Criteria:  ASA: Yes    BB: Beta-blocker not ordered due to other: OHT    Statin:Statin not ordered due to Other: Immediate post-op. Add when able    ACE: Not Applicable    Surgical Care Improvement Project:  Jones in Place: Yes, Jones to remain in place due to: Persistent clinical instability    Antibiotics D/C'd within 24 hours of surgery end: No -on Bactrim S/P OHT    Surgical Central Line: Yes, medically necessary    Cardiologist: EDIL  EF: 15% pre-op  Nares MRSA:.Neg   MSSA: Neg  Hemoglobin A1c:   Hemoglobin A1C (%)   Date Value   02/14/2023 4.8     Preoperative Creatinine:   Creatinine (mg/dL)    Date Value   2023 1.25 (H)       Assessment/Plan:  Ischemic cardiomyopathy/hx LVAD placement ()S/P Redo sternotomy, LVAD explant, AICD explant, OHT:   -HD overall stable   -Dobutamine @ 4; Uyen @ 40PPM; wean  to 2    -Immunosupression per [facility]   -1st RHC/EMBx    -Hepatology following as donor hep B/C positive  Hypotension: Vaso @ 0.02-wean. Levo off. Maintain MAP >60.  Lactic acidosis: Improving. Continue to trending.    Hypoxemia: 4L NC with Uyen delivery. Aggressive pulmonary hygiene.    Chronotropic incompetence: Isuprel @ 2-maintain today per MD. Pacing AAI @ 110; underlying SR in 80s.   Hypervolemia: Give bolus dose IV Lasix and start Lasix gtt @ 5. UO ~20cc/hr. Creat stable. Weight up   Hx atrial fibrillation: SR. Hold PTA Amio.    Acute post-operative blood loss anemia: Expected. Stable. Monitor.   Thrombocytopenia: Expected. Monitor.   Hyperglycemia: Endocrine following. Insulin gtt.   Dysphagia: Consult ST.   Hx pulmonary embolism/positive lupus anticoagulant/concern for antiphospholipid syndrome: On Coumadin pre-op with LVAD; will discuss with MD/EDIL.    PT/OT    Pathways:  Wires Out: No  Ambulating: No  Coumadin: Not Applicable    Discussed with or notes reviewed:  Patient, RN and MD    Discharge Disposition: To be determined lives alone; son lives nearby

## 2023-04-03 NOTE — ED NOTES
-stable  -continue statin therapy  -management per PCP Showed pt to room. Pt walked with steady gait. Pt was gowned, connected to cardiac monitor, Spo2, and BP.

## 2023-08-12 ENCOUNTER — APPOINTMENT (OUTPATIENT)
Dept: RADIOLOGY | Facility: MEDICAL CENTER | Age: 48
End: 2023-08-12
Attending: EMERGENCY MEDICINE
Payer: OTHER MISCELLANEOUS

## 2023-08-12 ENCOUNTER — HOSPITAL ENCOUNTER (OUTPATIENT)
Facility: MEDICAL CENTER | Age: 48
End: 2023-08-13
Attending: EMERGENCY MEDICINE | Admitting: FAMILY MEDICINE
Payer: OTHER MISCELLANEOUS

## 2023-08-12 DIAGNOSIS — R07.9 CHEST PAIN, UNSPECIFIED TYPE: ICD-10-CM

## 2023-08-12 PROBLEM — N17.9 AKI (ACUTE KIDNEY INJURY) (HCC): Status: ACTIVE | Noted: 2023-08-12

## 2023-08-12 PROBLEM — E11.51 TYPE 2 DIABETES MELLITUS WITH DIABETIC PERIPHERAL ANGIOPATHY WITHOUT GANGRENE, WITHOUT LONG-TERM CURRENT USE OF INSULIN (HCC): Status: ACTIVE | Noted: 2023-08-12

## 2023-08-12 LAB
ALBUMIN SERPL BCP-MCNC: 4.7 G/DL (ref 3.2–4.9)
ALBUMIN/GLOB SERPL: 1.6 G/DL
ALP SERPL-CCNC: 80 U/L (ref 30–99)
ALT SERPL-CCNC: 30 U/L (ref 2–50)
ANION GAP SERPL CALC-SCNC: 15 MMOL/L (ref 7–16)
AST SERPL-CCNC: 25 U/L (ref 12–45)
BASOPHILS # BLD AUTO: 0.3 % (ref 0–1.8)
BASOPHILS # BLD: 0.04 K/UL (ref 0–0.12)
BILIRUB SERPL-MCNC: 0.3 MG/DL (ref 0.1–1.5)
BUN SERPL-MCNC: 31 MG/DL (ref 8–22)
CALCIUM ALBUM COR SERPL-MCNC: 9.4 MG/DL (ref 8.5–10.5)
CALCIUM SERPL-MCNC: 10 MG/DL (ref 8.5–10.5)
CHLORIDE SERPL-SCNC: 101 MMOL/L (ref 96–112)
CO2 SERPL-SCNC: 22 MMOL/L (ref 20–33)
CREAT SERPL-MCNC: 1.8 MG/DL (ref 0.5–1.4)
D DIMER PPP IA.FEU-MCNC: <0.27 UG/ML (FEU) (ref 0–0.5)
EKG IMPRESSION: NORMAL
EOSINOPHIL # BLD AUTO: 0.05 K/UL (ref 0–0.51)
EOSINOPHIL NFR BLD: 0.4 % (ref 0–6.9)
ERYTHROCYTE [DISTWIDTH] IN BLOOD BY AUTOMATED COUNT: 44.1 FL (ref 35.9–50)
FLUAV RNA SPEC QL NAA+PROBE: NEGATIVE
FLUBV RNA SPEC QL NAA+PROBE: NEGATIVE
GFR SERPLBLD CREATININE-BSD FMLA CKD-EPI: 45 ML/MIN/1.73 M 2
GLOBULIN SER CALC-MCNC: 2.9 G/DL (ref 1.9–3.5)
GLUCOSE BLD STRIP.AUTO-MCNC: 95 MG/DL (ref 65–99)
GLUCOSE SERPL-MCNC: 110 MG/DL (ref 65–99)
HCT VFR BLD AUTO: 46.2 % (ref 42–52)
HGB BLD-MCNC: 15 G/DL (ref 14–18)
IMM GRANULOCYTES # BLD AUTO: 0.05 K/UL (ref 0–0.11)
IMM GRANULOCYTES NFR BLD AUTO: 0.4 % (ref 0–0.9)
LYMPHOCYTES # BLD AUTO: 2.65 K/UL (ref 1–4.8)
LYMPHOCYTES NFR BLD: 20.2 % (ref 22–41)
MCH RBC QN AUTO: 28 PG (ref 27–33)
MCHC RBC AUTO-ENTMCNC: 32.5 G/DL (ref 32.3–36.5)
MCV RBC AUTO: 86.2 FL (ref 81.4–97.8)
MONOCYTES # BLD AUTO: 0.92 K/UL (ref 0–0.85)
MONOCYTES NFR BLD AUTO: 7 % (ref 0–13.4)
NEUTROPHILS # BLD AUTO: 9.44 K/UL (ref 1.82–7.42)
NEUTROPHILS NFR BLD: 71.7 % (ref 44–72)
NRBC # BLD AUTO: 0 K/UL
NRBC BLD-RTO: 0 /100 WBC (ref 0–0.2)
NT-PROBNP SERPL IA-MCNC: <36 PG/ML (ref 0–125)
PLATELET # BLD AUTO: 368 K/UL (ref 164–446)
PMV BLD AUTO: 10.2 FL (ref 9–12.9)
POTASSIUM SERPL-SCNC: 4.4 MMOL/L (ref 3.6–5.5)
PROCALCITONIN SERPL-MCNC: 0.07 NG/ML
PROT SERPL-MCNC: 7.6 G/DL (ref 6–8.2)
RBC # BLD AUTO: 5.36 M/UL (ref 4.7–6.1)
RSV RNA SPEC QL NAA+PROBE: NEGATIVE
SARS-COV-2 RNA RESP QL NAA+PROBE: NOTDETECTED
SODIUM SERPL-SCNC: 138 MMOL/L (ref 135–145)
SPECIMEN SOURCE: NORMAL
TROPONIN T SERPL-MCNC: 10 NG/L (ref 6–19)
TROPONIN T SERPL-MCNC: 10 NG/L (ref 6–19)
TROPONIN T SERPL-MCNC: 12 NG/L (ref 6–19)
WBC # BLD AUTO: 13.2 K/UL (ref 4.8–10.8)

## 2023-08-12 PROCEDURE — 700111 HCHG RX REV CODE 636 W/ 250 OVERRIDE (IP)

## 2023-08-12 PROCEDURE — 83880 ASSAY OF NATRIURETIC PEPTIDE: CPT

## 2023-08-12 PROCEDURE — 82962 GLUCOSE BLOOD TEST: CPT

## 2023-08-12 PROCEDURE — 93005 ELECTROCARDIOGRAM TRACING: CPT

## 2023-08-12 PROCEDURE — 99222 1ST HOSP IP/OBS MODERATE 55: CPT | Mod: GC | Performed by: FAMILY MEDICINE

## 2023-08-12 PROCEDURE — 0241U HCHG SARS-COV-2 COVID-19 NFCT DS RESP RNA 4 TRGT MIC: CPT

## 2023-08-12 PROCEDURE — A9270 NON-COVERED ITEM OR SERVICE: HCPCS

## 2023-08-12 PROCEDURE — 99285 EMERGENCY DEPT VISIT HI MDM: CPT

## 2023-08-12 PROCEDURE — 93005 ELECTROCARDIOGRAM TRACING: CPT | Performed by: EMERGENCY MEDICINE

## 2023-08-12 PROCEDURE — 84145 PROCALCITONIN (PCT): CPT

## 2023-08-12 PROCEDURE — 85379 FIBRIN DEGRADATION QUANT: CPT

## 2023-08-12 PROCEDURE — 36415 COLL VENOUS BLD VENIPUNCTURE: CPT

## 2023-08-12 PROCEDURE — 71045 X-RAY EXAM CHEST 1 VIEW: CPT

## 2023-08-12 PROCEDURE — 80053 COMPREHEN METABOLIC PANEL: CPT

## 2023-08-12 PROCEDURE — 700102 HCHG RX REV CODE 250 W/ 637 OVERRIDE(OP)

## 2023-08-12 PROCEDURE — G0378 HOSPITAL OBSERVATION PER HR: HCPCS

## 2023-08-12 PROCEDURE — 96372 THER/PROPH/DIAG INJ SC/IM: CPT

## 2023-08-12 PROCEDURE — 85025 COMPLETE CBC W/AUTO DIFF WBC: CPT

## 2023-08-12 PROCEDURE — 84484 ASSAY OF TROPONIN QUANT: CPT | Mod: 91

## 2023-08-12 PROCEDURE — C9803 HOPD COVID-19 SPEC COLLECT: HCPCS | Performed by: EMERGENCY MEDICINE

## 2023-08-12 RX ORDER — BENAZEPRIL HYDROCHLORIDE 10 MG/1
40 TABLET ORAL
Status: DISCONTINUED | OUTPATIENT
Start: 2023-08-12 | End: 2023-08-13 | Stop reason: HOSPADM

## 2023-08-12 RX ORDER — OXYCODONE HYDROCHLORIDE 5 MG/1
2.5 TABLET ORAL
Status: DISCONTINUED | OUTPATIENT
Start: 2023-08-12 | End: 2023-08-13 | Stop reason: HOSPADM

## 2023-08-12 RX ORDER — ONDANSETRON 4 MG/1
4 TABLET, ORALLY DISINTEGRATING ORAL EVERY 4 HOURS PRN
Status: DISCONTINUED | OUTPATIENT
Start: 2023-08-12 | End: 2023-08-13 | Stop reason: HOSPADM

## 2023-08-12 RX ORDER — ASPIRIN 81 MG/1
324 TABLET, CHEWABLE ORAL
Status: DISCONTINUED | OUTPATIENT
Start: 2023-08-12 | End: 2023-08-13 | Stop reason: HOSPADM

## 2023-08-12 RX ORDER — TIRZEPATIDE 2.5 MG/.5ML
2.5 INJECTION, SOLUTION SUBCUTANEOUS
COMMUNITY

## 2023-08-12 RX ORDER — AMLODIPINE AND BENAZEPRIL HYDROCHLORIDE 10; 40 MG/1; MG/1
1 CAPSULE ORAL DAILY
Status: DISCONTINUED | OUTPATIENT
Start: 2023-08-12 | End: 2023-08-12

## 2023-08-12 RX ORDER — CETIRIZINE HYDROCHLORIDE 10 MG/1
10 TABLET ORAL NIGHTLY
COMMUNITY

## 2023-08-12 RX ORDER — ACETAMINOPHEN 325 MG/1
650 TABLET ORAL EVERY 6 HOURS PRN
Status: DISCONTINUED | OUTPATIENT
Start: 2023-08-12 | End: 2023-08-13 | Stop reason: HOSPADM

## 2023-08-12 RX ORDER — CETIRIZINE HYDROCHLORIDE 10 MG/1
10 TABLET ORAL NIGHTLY
Status: DISCONTINUED | OUTPATIENT
Start: 2023-08-12 | End: 2023-08-13 | Stop reason: HOSPADM

## 2023-08-12 RX ORDER — ATORVASTATIN CALCIUM 80 MG/1
80 TABLET, FILM COATED ORAL EVERY EVENING
Status: DISCONTINUED | OUTPATIENT
Start: 2023-08-12 | End: 2023-08-13 | Stop reason: HOSPADM

## 2023-08-12 RX ORDER — HEPARIN SODIUM 5000 [USP'U]/ML
5000 INJECTION, SOLUTION INTRAVENOUS; SUBCUTANEOUS EVERY 8 HOURS
Status: DISCONTINUED | OUTPATIENT
Start: 2023-08-12 | End: 2023-08-13 | Stop reason: HOSPADM

## 2023-08-12 RX ORDER — ONDANSETRON 2 MG/ML
4 INJECTION INTRAMUSCULAR; INTRAVENOUS EVERY 4 HOURS PRN
Status: DISCONTINUED | OUTPATIENT
Start: 2023-08-12 | End: 2023-08-13 | Stop reason: HOSPADM

## 2023-08-12 RX ORDER — PROMETHAZINE HYDROCHLORIDE 25 MG/1
12.5-25 SUPPOSITORY RECTAL EVERY 4 HOURS PRN
Status: DISCONTINUED | OUTPATIENT
Start: 2023-08-12 | End: 2023-08-13 | Stop reason: HOSPADM

## 2023-08-12 RX ORDER — HYDROMORPHONE HYDROCHLORIDE 1 MG/ML
0.25 INJECTION, SOLUTION INTRAMUSCULAR; INTRAVENOUS; SUBCUTANEOUS
Status: DISCONTINUED | OUTPATIENT
Start: 2023-08-12 | End: 2023-08-13 | Stop reason: HOSPADM

## 2023-08-12 RX ORDER — DEXTROSE MONOHYDRATE 25 G/50ML
25 INJECTION, SOLUTION INTRAVENOUS
Status: DISCONTINUED | OUTPATIENT
Start: 2023-08-12 | End: 2023-08-13 | Stop reason: HOSPADM

## 2023-08-12 RX ORDER — PROMETHAZINE HYDROCHLORIDE 25 MG/1
12.5-25 TABLET ORAL EVERY 4 HOURS PRN
Status: DISCONTINUED | OUTPATIENT
Start: 2023-08-12 | End: 2023-08-13 | Stop reason: HOSPADM

## 2023-08-12 RX ORDER — PROCHLORPERAZINE EDISYLATE 5 MG/ML
5-10 INJECTION INTRAMUSCULAR; INTRAVENOUS EVERY 4 HOURS PRN
Status: DISCONTINUED | OUTPATIENT
Start: 2023-08-12 | End: 2023-08-13 | Stop reason: HOSPADM

## 2023-08-12 RX ORDER — TAMSULOSIN HYDROCHLORIDE 0.4 MG/1
0.4 CAPSULE ORAL
Status: DISCONTINUED | OUTPATIENT
Start: 2023-08-12 | End: 2023-08-13 | Stop reason: HOSPADM

## 2023-08-12 RX ORDER — BISACODYL 10 MG
10 SUPPOSITORY, RECTAL RECTAL
Status: DISCONTINUED | OUTPATIENT
Start: 2023-08-12 | End: 2023-08-13 | Stop reason: HOSPADM

## 2023-08-12 RX ORDER — AMLODIPINE BESYLATE 10 MG/1
10 TABLET ORAL
Status: DISCONTINUED | OUTPATIENT
Start: 2023-08-12 | End: 2023-08-13 | Stop reason: HOSPADM

## 2023-08-12 RX ORDER — ASPIRIN 81 MG/1
324 TABLET, CHEWABLE ORAL
COMMUNITY

## 2023-08-12 RX ORDER — AMOXICILLIN 250 MG
2 CAPSULE ORAL 2 TIMES DAILY
Status: DISCONTINUED | OUTPATIENT
Start: 2023-08-12 | End: 2023-08-13 | Stop reason: HOSPADM

## 2023-08-12 RX ORDER — POLYETHYLENE GLYCOL 3350 17 G/17G
1 POWDER, FOR SOLUTION ORAL
Status: DISCONTINUED | OUTPATIENT
Start: 2023-08-12 | End: 2023-08-13 | Stop reason: HOSPADM

## 2023-08-12 RX ORDER — OXYCODONE HYDROCHLORIDE 5 MG/1
5 TABLET ORAL
Status: DISCONTINUED | OUTPATIENT
Start: 2023-08-12 | End: 2023-08-13 | Stop reason: HOSPADM

## 2023-08-12 RX ADMIN — BENAZEPRIL HYDROCHLORIDE 40 MG: 10 TABLET ORAL at 21:11

## 2023-08-12 RX ADMIN — ATORVASTATIN CALCIUM 80 MG: 80 TABLET, FILM COATED ORAL at 18:00

## 2023-08-12 RX ADMIN — CETIRIZINE HYDROCHLORIDE 10 MG: 10 TABLET, FILM COATED ORAL at 21:10

## 2023-08-12 RX ADMIN — AMLODIPINE BESYLATE 10 MG: 10 TABLET ORAL at 16:07

## 2023-08-12 RX ADMIN — TAMSULOSIN HYDROCHLORIDE 0.4 MG: 0.4 CAPSULE ORAL at 16:07

## 2023-08-12 RX ADMIN — ASPIRIN 81 MG 324 MG: 81 TABLET ORAL at 21:09

## 2023-08-12 RX ADMIN — HEPARIN SODIUM 5000 UNITS: 5000 INJECTION, SOLUTION INTRAVENOUS; SUBCUTANEOUS at 23:57

## 2023-08-12 RX ADMIN — HEPARIN SODIUM 5000 UNITS: 5000 INJECTION, SOLUTION INTRAVENOUS; SUBCUTANEOUS at 18:05

## 2023-08-12 ASSESSMENT — LIFESTYLE VARIABLES
TOTAL SCORE: 0
EVER FELT BAD OR GUILTY ABOUT YOUR DRINKING: NO
CONSUMPTION TOTAL: NEGATIVE
ALCOHOL_USE: YES
ON A TYPICAL DAY WHEN YOU DRINK ALCOHOL HOW MANY DRINKS DO YOU HAVE: 1
AVERAGE NUMBER OF DAYS PER WEEK YOU HAVE A DRINK CONTAINING ALCOHOL: 2
HAVE PEOPLE ANNOYED YOU BY CRITICIZING YOUR DRINKING: NO
HAVE YOU EVER FELT YOU SHOULD CUT DOWN ON YOUR DRINKING: NO
EVER HAD A DRINK FIRST THING IN THE MORNING TO STEADY YOUR NERVES TO GET RID OF A HANGOVER: NO
HOW MANY TIMES IN THE PAST YEAR HAVE YOU HAD 5 OR MORE DRINKS IN A DAY: 0

## 2023-08-12 ASSESSMENT — FIBROSIS 4 INDEX: FIB4 SCORE: 0.66

## 2023-08-12 ASSESSMENT — PATIENT HEALTH QUESTIONNAIRE - PHQ9
1. LITTLE INTEREST OR PLEASURE IN DOING THINGS: NOT AT ALL
2. FEELING DOWN, DEPRESSED, IRRITABLE, OR HOPELESS: NOT AT ALL
SUM OF ALL RESPONSES TO PHQ9 QUESTIONS 1 AND 2: 0

## 2023-08-12 ASSESSMENT — PAIN DESCRIPTION - PAIN TYPE
TYPE: ACUTE PAIN
TYPE: ACUTE PAIN

## 2023-08-12 NOTE — PROGRESS NOTES
Report received, pt care assumed. Pt to Butt-02, Pt family to U lobby; Awaiting T207 to be cleaned.

## 2023-08-12 NOTE — ED NOTES
Med Rec complete per patient and family at bedside   Allergies reviewed  No oral antibiotics in the last 30 days  Preferred pharmacy: [facility]

## 2023-08-12 NOTE — ED NOTES
Patient transported to  [facility] by T2 RN on [facility]. Aox4, on room air. Patient care tranferred.

## 2023-08-12 NOTE — H&P
"    FAMILY MEDICINE HISTORY AND PHYSICAL       PATIENT ID:  NAME:  Herrera Barros  MRN:               [id number]  YOB: 1975    Date of Admission: 8/12/2023     Attending: Dhiraj Momin M.d.     Resident: Jolanta Mendez M.D.    Primary Care Physician:  Jermaine Mckenzie M.D.    CC:    Chief Complaint   Patient presents with    Chest Pain     Associated with shortness of breath, dizziness, headache, left eye pain and nausea that started 2 hours ago  Pain: 7/10       HPI: Herrera Barros is a 47 y.o. male who presented with concern of chest pain.    Patient with intermittent chest pain starting 8/12 AM that was persistent. States it feels \"tight.\" Patient states it began when lifting heavy things and has continued since then. Patient believes he has worsening of pain with exertion. It is not worse with touch to the area. States he was sweaty and nauseous at work but that has resolved. Denies radiating pain to jaw, arm, or back. Has never had pain like this before.     No family history of early cardiac death.     Denies change of vision, dizziness, syncope, change of speech, difficulty with balance, weakness, swelling of legs, pain with inspiration, shortness of breath, nausea, vomiting, fever, chills.     ERCourse:  CBC non-contributory, WBC elevated without left shift.     CMP with NASRA, BUN:Cr of 17.     Patient with EKG with new RBBB compared to 2021.     Troponin at 12. ProBNP <36. Viral panel negative. CXR without acute process.     REVIEW OF SYSTEMS:   Ten systems reviewed and were negative except as noted in the HPI.                PAST MEDICAL HISTORY:   has a past medical history of Arrhythmia, Common carotid artery thrombosis, right (01/2021), Hypertension, Renal disorder (2020), and Snoring.     PAST SURGICAL HISTORY:   has a past surgical history that includes other orthopedic surgery (Left, 2001); pr cystoscopy,insert ureteral stent (10/12/2020); pr " cysto/uretero/pyeloscopy, dx (10/12/2020); ventral hernia repair robotic xi (11/3/2020); pr thromboendartectmy neck,neck incis (Right, 1/27/2021); and angiogram (Right, 1/27/2021).     FAMILY HISTORY:  family history includes Hypertension in his father and mother.     SOCIAL HISTORY:   Employment: Works as   Living Situation: Lives with wife, daughter at hospital with patient  Smoking: Denies   Etoh: Social  Recreational Drug: Denies    DIET:   Orders Placed This Encounter   Procedures    Diet NPO Restrict to: Sips with Medications     Standing Status:   Standing     Number of Occurrences:   1     Order Specific Question:   Diet NPO Restrict to:     Answer:   Sips with Medications [3]       ALLERGIES:  No Known Allergies    OUTPATIENT MEDICATIONS:    Current Facility-Administered Medications:     senna-docusate (Pericolace Or Senokot S) 8.6-50 MG per tablet 2 Tablet, 2 Tablet, Oral, BID **AND** polyethylene glycol/lytes (Miralax) PACKET 1 Packet, 1 Packet, Oral, QDAY PRN **AND** magnesium hydroxide (Milk Of Magnesia) suspension 30 mL, 30 mL, Oral, QDAY PRN **AND** bisacodyl (Dulcolax) suppository 10 mg, 10 mg, Rectal, QDAY PRN, Jolanta Mendez M.D.    heparin injection 5,000 Units, 5,000 Units, Subcutaneous, Q8HRS, Jolanta Mendez M.D.    ondansetron (Zofran) syringe/vial injection 4 mg, 4 mg, Intravenous, Q4HRS PRN, Jolanta Mendez M.D.    ondansetron (Zofran ODT) dispertab 4 mg, 4 mg, Oral, Q4HRS PRN, Jolanta Mendez M.D.    promethazine (Phenergan) tablet 12.5-25 mg, 12.5-25 mg, Oral, Q4HRS PRN, Jolanta Mendez M.D.    promethazine (Phenergan) suppository 12.5-25 mg, 12.5-25 mg, Rectal, Q4HRS PRN, Jolanta Mendez M.D.    prochlorperazine (Compazine) injection 5-10 mg, 5-10 mg, Intravenous, Q4HRS PRN, Jolanta Mendez M.D.    Pharmacy Consult Request ...Pain Management Review 1 Each, 1 Each, Other, PHARMACY TO DOSE, Jolanta Mendez M.D.    acetaminophen (Tylenol) tablet 650 mg, 650  mg, Oral, Q6HRS PRN, Jolanta Mendez M.D.    oxyCODONE immediate-release (Roxicodone) tablet 2.5 mg, 2.5 mg, Oral, Q3HRS PRN **OR** oxyCODONE immediate-release (Roxicodone) tablet 5 mg, 5 mg, Oral, Q3HRS PRN **OR** HYDROmorphone (Dilaudid) injection 0.25 mg, 0.25 mg, Intravenous, Q3HRS PRN, Jolanta Mendez M.D.    insulin regular (HumuLIN R,NovoLIN R) injection, 1-6 Units, Subcutaneous, Q6HRS **AND** POC blood glucose manual result, , , Q6H **AND** NOTIFY MD and PharmD, , , Once **AND** Administer 20 grams of glucose (approximately 8 ounces of fruit juice) every 15 minutes PRN FSBG less than 70 mg/dL, , , PRN **AND** dextrose 50% (D50W) injection 25 g, 25 g, Intravenous, Q15 MIN PRN, Jolanta Mendez M.D.    aspirin (Asa) chewable tab 324 mg, 324 mg, Oral, QHS, Jolanta Mendez M.D.    atorvastatin (Lipitor) tablet 80 mg, 80 mg, Oral, Q EVENING, Jolanta Mendez M.D.    cetirizine (ZyrTEC) tablet 10 mg, 10 mg, Oral, Nightly, Jolanta Mendez M.D.    tamsulosin (Flomax) capsule 0.4 mg, 0.4 mg, Oral, AFTER BREAKFAST, Jolanta Mendez M.D., 0.4 mg at 23 1607    amLODIPine (Norvasc) tablet 10 mg, 10 mg, Oral, Q DAY, 10 mg at 23 1607 **AND** benazepril (Lotensin) tablet 40 mg, 40 mg, Oral, Q DAY, Jolanta Mendez M.D.    PHYSICAL EXAM:  Vitals:    23 1402 23 1504 23 1602 23 1642   BP: 120/74 114/67 113/65 115/56   Pulse: 81 73 69 73   Resp: 18  18 18   Temp:    36.5 °C (97.7 °F)   TempSrc:    Temporal   SpO2: 94% 91% 94% 93%   Weight:       Height:       , Temp (24hrs), Av.4 °C (97.6 °F), Min:36.4 °C (97.5 °F), Max:36.5 °C (97.7 °F)  , Pulse Oximetry: 93 %, O2 (LPM): 0, O2 Delivery Device: None - Room Air    Physical Exam  Constitutional:       General: He is not in acute distress.     Appearance: Normal appearance. He is not toxic-appearing or diaphoretic.   HENT:      Head: Normocephalic and atraumatic.      Nose: Nose normal. No congestion.      Mouth/Throat:       Mouth: Mucous membranes are moist.      Pharynx: Oropharynx is clear.   Eyes:      Extraocular Movements: Extraocular movements intact.      Conjunctiva/sclera: Conjunctivae normal.   Cardiovascular:      Rate and Rhythm: Normal rate and regular rhythm.      Heart sounds: No murmur heard.     No friction rub. No gallop.   Pulmonary:      Effort: Pulmonary effort is normal. No respiratory distress.      Breath sounds: No wheezing or rales.   Abdominal:      General: Abdomen is flat. There is no distension.      Palpations: Abdomen is soft.      Tenderness: There is no abdominal tenderness. There is no right CVA tenderness, left CVA tenderness, guarding or rebound.   Musculoskeletal:         General: No swelling or deformity. Normal range of motion.      Cervical back: Normal range of motion. No rigidity.      Right lower leg: No edema.      Left lower leg: No edema.   Lymphadenopathy:      Cervical: No cervical adenopathy.   Skin:     General: Skin is warm.      Capillary Refill: Capillary refill takes less than 2 seconds.      Coloration: Skin is not jaundiced.      Findings: No bruising.   Neurological:      General: No focal deficit present.      Mental Status: He is alert and oriented to person, place, and time.   Psychiatric:         Mood and Affect: Mood normal.           LAB TESTS:   Recent Labs     08/12/23  1220   WBC 13.2*   RBC 5.36   HEMOGLOBIN 15.0   HEMATOCRIT 46.2   MCV 86.2   MCH 28.0   MCHC 32.5   RDW 44.1   PLATELETCT 368   MPV 10.2     Recent Labs     08/12/23  1220   SODIUM 138   POTASSIUM 4.4   CHLORIDE 101   CO2 22   GLUCOSE 110*   BUN 31*   CREATININE 1.80*   CALCIUM 10.0     Recent Labs     08/12/23  1220   ALTSGPT 30   ASTSGOT 25   ALKPHOSPHAT 80   TBILIRUBIN 0.3   GLUCOSE 110*         Recent Labs     08/12/23  1220   NTPROBNP <36         Recent Labs     08/12/23  1220 08/12/23  1425   TROPONINT 12 10       CULTURES:   Results       Procedure Component Value Units Date/Time    CoV-2, FLU  A/B, and RSV by PCR (2-4 Hours Pharm[facility]) : Collect NP swab in VTM [[id number]] Collected: 08/12/23 1343    Order Status: Completed Specimen: Respirate Updated: 08/12/23 1456     Influenza virus A RNA Negative     Influenza virus B, PCR Negative     RSV, PCR Negative     SARS-CoV-2 by PCR NotDetected     Comment: [facility] providers: PLEASE REFER TO DE-ESCALATION AND RETESTING PROTOCOL  on [email or web address]    **The [employer] GeneXpert Xpress SARS-CoV-2 RT-PCR Test has been made  available for use under the Emergency Use Authorization (EUA) only.          SARS-CoV-2 Source NP Swab            IMAGES:  DX-CHEST-PORTABLE (1 VIEW)   Final Result      1.  There is no acute cardiopulmonary process.          CONSULTS:   None    ASSESSMENT/PLAN:   47 y.o. male admitted for chest pain rule out ACS    I anticipate this patient is appropriate for observation status at this time because unlikely that chest pain is ACS.     Patient will need a Telemetry bed secondary to rule out ACS      * Chest pain- (present on admission)  Assessment & Plan  Patient with heart score of 5 given prior TIA with removal of right carotid thrombosis, DM, HLD, HTN. Unlikely ACS given ECG and labs; however at high risk of ACS so admitted to observation. Troponin trending down.     -Trend troponin x3, if down trending will discontinue   -NPO until troponin down trending with concern for need of intervention   -Will consider consult to cardiology if concern for need for intervention arises   -ECG for chest pain         NASRA (acute kidney injury) (HCC)  Assessment & Plan  Patient with Cr of 1.8 from baseline 0.6-0.9. With BUN:Cr of 17. Likely prerenal injury. Patient with concern for ACS. In setting of r/o ACS and patient troponin down trending likely to not need PCI or further intervention.     -If f/u troponin is continuing to downtrend, will allow patient to hydrate orally.   -Repeat BMP in AM     Type 2 diabetes mellitus with diabetic peripheral angiopathy  without gangrene, without long-term current use of insulin (HCC)  Assessment & Plan  Patient with last A1c in 04/2021 of 6.4 from chart review. On    -Holding home DM meds  -LDSSI  -A1c in AM     Dyslipidemia- (present on admission)  Assessment & Plan  Last lipid panel on file 04/2021.   -Home meds  -Lipid panel in AM    Leukocytosis- (present on admission)  Assessment & Plan  Patient with leukocytosis to 13 in ED without left shift. No evidence of acute infection, likely 2/2 hemoconcentration.     -See NASRA plan     Essential hypertension- (present on admission)  Assessment & Plan  -Home meds      Core Measures:  Fluids: None  Lines: PIV  Abx: None   Diet: Cardiac and diabetic  PPX: SCDs/TEDs and heparin ppx    CODE Status: Full Code

## 2023-08-12 NOTE — ED TRIAGE NOTES
Chief Complaint   Patient presents with    Chest Pain     Associated with shortness of breath, dizziness, headache, left eye pain and nausea that started 2 hours ago  Pain: 7/10     ECG: New RBB    Pt came in to triage ambulatory with steady gait for the above complaints.     Pt is alert and oriented x 4, speaking in full sentences, follows commands and responds appropriately to questions.     Respirations are even and unlabored.    Pt placed in lobby. Pt educated on triage process.     Pt encouraged to inform staff for any changes in condition or if needs help while waiting to be room in.    Vitals:    08/12/23 1205   BP: 124/82   Pulse: (!) 105   Resp: 18   Temp: 36.4 °C (97.5 °F)   SpO2: 97%

## 2023-08-12 NOTE — ED PROVIDER NOTES
ED Provider Note    CHIEF COMPLAINT  Chief Complaint   Patient presents with    Chest Pain     Associated with shortness of breath, dizziness, headache, left eye pain and nausea that started 2 hours ago  Pain: 7/10       EXTERNAL RECORDS REVIEWED  Discharge summary completed on 2/1/2021  45-year-old male currently admitted with Covid infection who on 01/22/2021. Patient was placed on steroids and oxygen. On 1/26/2021 had neurological changes consisting of left upper extremity numbness and weakness.  A CTA of the head and neck was obtained showing a right common carotid artery thrombus with 65% stenosis.  The patient was not a TPA candidate.  Vascular surgery was consulted; the patient underwent a carotid endarterectomy with thrombectomy and subsequently was on a heparin drip and bridged to coumadin. 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  Overall, these infarcts are consistent with the vascular territory supplied by the carotid artery and are likely secondary to watershed injury from thrombus in the carotid. Patient had no lasting neurologic deficits. Patients labs demonstrated thrombocytosis which was likely reactive in nature. Recommend follow up CBC in 1 week after establishing care with PCP. Patient was instructed to have follow up with stroke clinic, anticoagulation clinic, vascular surgery and establish care with PCP. Patient was discharged home with home O2.   HPI/ROS    OUTSIDE HISTORIAN(S):  Patient's family at bedside stating has had chest discomfort intermittently for the last several days and this morning he was out working out severe chest pain in the center of his chest.  He was evaluated his daughter and she states that he was very ashen, diaphoretic and complained of chest pain in the center of his chest.  Patient did have COVID with CVA-like symptoms back in 2021 and had an endarterectomy of the right carotid artery at  that point.    Herrera Barros is a 47 y.o. male who presents chest pain center of his chest, came approximate 2 hours prior to arrival.  Is no radiation to his neck, to his back, to his jaw, to his arms.  He has had intermittent chest discomfort last several days, denies fever, shakes, chills, sweats, cough, swelling of his lower extremities, history of cardiac disease.  He does have hypertension and diabetes and recently had his medications changed.  He states he does not have a history of a right bundle branch block in the past.    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    SURGICAL HISTORY   has a past surgical history that includes other orthopedic surgery (Left, 2001); cystoscopy,insert ureteral stent (10/12/2020); cysto/uretero/pyeloscopy, dx (10/12/2020); ventral hernia repair robotic xi (11/3/2020); thromboendartectmy neck,neck incis (Right, 1/27/2021); and angiogram (Right, 1/27/2021).    FAMILY HISTORY  Family History   Problem Relation Age of Onset    Hypertension Mother     Hypertension Father        SOCIAL HISTORY  Social History     Tobacco Use    Smoking status: Never    Smokeless tobacco: Never   Vaping Use    Vaping Use: Never used   Substance and Sexual Activity    Alcohol use: Yes     Comment: 1 per month    Drug use: No    Sexual activity: Not on file       CURRENT MEDICATIONS  Home Medications       Reviewed by Virginia Painting (Pharmacy Tech) on 08/12/23 at 1348  Med List Status: Complete     Medication Last Dose Status   amlodipine-benazepril (LOTREL) 10-40 MG per capsule 8/12/2023 Active   aspirin (ASA) 81 MG Chew Tab chewable tablet 8/11/2023 Active   atorvastatin (LIPITOR) 80 MG tablet 8/11/2023 Active   Calcium-Phosphorus-Vitamin D (CALCIUM GUMMIES PO) 8/12/2023 Active   cetirizine (ZYRTEC) 10 MG Tab 8/11/2023 Active   metFORMIN (GLUCOPHAGE) 500 MG  "Tab 8/12/2023 Active   tamsulosin (FLOMAX) 0.4 MG capsule 8/11/2023 Active   Tirzepatide (MOUNJARO) 2.5 MG/0.5ML Solution Pen-injector 8/9/2023 Active                    ALLERGIES  No Known Allergies    PHYSICAL EXAM  VITAL SIGNS: /82   Pulse (!) 105   Temp 36.4 °C (97.5 °F) (Temporal)   Resp 18   Ht 1.651 m (5' 5\")   Wt 96.2 kg (212 lb)   SpO2 97%   BMI 35.28 kg/m²      Nursing notes and vitals reviewed.  Constitutional: Well developed, Well nourished, No acute distress, Non-toxic appearance.   Eyes: PERRLA, EOMI, Conjunctiva normal, No discharge.   Cardiovascular: Normal heart rate, Normal rhythm, No murmurs, No rubs, No gallops.   Thorax & Lungs: No respiratory distress, No rales, No rhonchi, No wheezing, No chest tenderness.   Abdomen: Bowel sounds normal, Soft, No tenderness, No guarding, No rebound, No masses, No pulsatile masses.   Skin: Warm, Dry, No erythema, No rash.   Extremities: No deformity, no pedal edema, good range of motion range of motion upper lower extremes bilaterally  Neurologic: Alert & oriented x 3, no focal abnormalities noted, acting appropriately on examination  Psychiatric: Affect normal for clinical presentation.        DIAGNOSTIC STUDIES / PROCEDURES  EKG  I have independently interpreted this EKG  Sinus rhythm on monitor    LABS  Results for orders placed or performed during the hospital encounter of 08/12/23   CBC with Differential   Result Value Ref Range    WBC 13.2 (H) 4.8 - 10.8 K/uL    RBC 5.36 4.70 - 6.10 M/uL    Hemoglobin 15.0 14.0 - 18.0 g/dL    Hematocrit 46.2 42.0 - 52.0 %    MCV 86.2 81.4 - 97.8 fL    MCH 28.0 27.0 - 33.0 pg    MCHC 32.5 32.3 - 36.5 g/dL    RDW 44.1 35.9 - 50.0 fL    Platelet Count 368 164 - 446 K/uL    MPV 10.2 9.0 - 12.9 fL    Neutrophils-Polys 71.70 44.00 - 72.00 %    Lymphocytes 20.20 (L) 22.00 - 41.00 %    Monocytes 7.00 0.00 - 13.40 %    Eosinophils 0.40 0.00 - 6.90 %    Basophils 0.30 0.00 - 1.80 %    Immature Granulocytes 0.40 0.00 - " 0.90 %    Nucleated RBC 0.00 0.00 - 0.20 /100 WBC    Neutrophils (Absolute) 9.44 (H) 1.82 - 7.42 K/uL    Lymphs (Absolute) 2.65 1.00 - 4.80 K/uL    Monos (Absolute) 0.92 (H) 0.00 - 0.85 K/uL    Eos (Absolute) 0.05 0.00 - 0.51 K/uL    Baso (Absolute) 0.04 0.00 - 0.12 K/uL    Immature Granulocytes (abs) 0.05 0.00 - 0.11 K/uL    NRBC (Absolute) 0.00 K/uL   Complete Metabolic Panel (CMP)   Result Value Ref Range    Sodium 138 135 - 145 mmol/L    Potassium 4.4 3.6 - 5.5 mmol/L    Chloride 101 96 - 112 mmol/L    Co2 22 20 - 33 mmol/L    Anion Gap 15.0 7.0 - 16.0    Glucose 110 (H) 65 - 99 mg/dL    Bun 31 (H) 8 - 22 mg/dL    Creatinine 1.80 (H) 0.50 - 1.40 mg/dL    Calcium 10.0 8.5 - 10.5 mg/dL    Correct Calcium 9.4 8.5 - 10.5 mg/dL    AST(SGOT) 25 12 - 45 U/L    ALT(SGPT) 30 2 - 50 U/L    Alkaline Phosphatase 80 30 - 99 U/L    Total Bilirubin 0.3 0.1 - 1.5 mg/dL    Albumin 4.7 3.2 - 4.9 g/dL    Total Protein 7.6 6.0 - 8.2 g/dL    Globulin 2.9 1.9 - 3.5 g/dL    A-G Ratio 1.6 g/dL   Troponins NOW   Result Value Ref Range    Troponin T 12 6 - 19 ng/L   ESTIMATED GFR   Result Value Ref Range    GFR (CKD-EPI) 45 (A) >60 mL/min/1.73 m 2   D-DIMER   Result Value Ref Range    D-Dimer <0.27 0.00 - 0.50 ug/mL (FEU)   EKG (NOW)   Result Value Ref Range    Report       [facility] Emergency Dept.    Test Date:  2023  Pt Name:    ADDIE OHARA   Department: ER  MRN:        [id number]                      Room:  Gender:     Male                         Technician: [id number]  :        1975                   Requested By:ER TRIAGE PROTOCOL  Order #:    [id number]                    Reading MD: RONNIE CHRISTOPHER, DO    Measurements  Intervals                                Axis  Rate:       96                           P:          42  CO:         186                          QRS:        -8  QRSD:       159                          T:          25  QT:         379  QTc:        479    Interpretive  Statements  Sinus rhythm  Right bundle branch block  Compared to ECG 02/02/2021 23:29:14  Right bundle-branch block now present  Electronically Signed On 08- 13:11:27 PDT by RONNIE CHRISTOPHER DO           RADIOLOGY  I have independently interpreted the diagnostic imaging associated with this visit and am waiting the final reading from the radiologist.   My preliminary interpretation is as follows: Chest x-ray is negative for infiltrate  Radiologist interpretation:   DX-CHEST-PORTABLE (1 VIEW)   Final Result      1.  There is no acute cardiopulmonary process.            COURSE & MEDICAL DECISION MAKING    ED Observation Status? No; Patient does not meet criteria for ED Observation.     INITIAL ASSESSMENT, COURSE AND PLAN  Care Narrative: This is a pleasant 47-year-old gent with a heart score of 5 who presents with chest pain and new onset right bundle branch block.  I do not have access to old EKGs except for 1 back in 2021 that is a normal sinus rhythm with no bundle branch block.  Now the patient has new bony [place] and in the setting of chest pain with hypertension, diabetes, age I do believe the patient warrants hospitalization.  His negative troponin and in no evidence of ST elevation myocardial infarction on EKG is reassuring but the patient does have significant risk factors as a heart score of 5.  He has a negative D-dimer excluding pulmonary embolism with a low Wells score, does not have clinical historical complaint consistent with aortic dissection or aortic aneurysm.  This reason, I will be hospitalized the patient to the hospitalist.      DISPOSITION AND DISCUSSIONS  I have discussed management of the patient with the following physicians and NOA's: [facility] for hospitalization.      Decision tools and prescription drugs considered including, but not limited to: Negative D-dimer therefore CTA pulm angiogram was not completed with low Wells score.    FINAL DIAGNOSIS  Chest pain        Electronically signed by: Garret To D.O., 8/12/2023 1:47 PM

## 2023-08-13 ENCOUNTER — PHARMACY VISIT (OUTPATIENT)
Dept: PHARMACY | Facility: MEDICAL CENTER | Age: 48
End: 2023-08-13
Payer: COMMERCIAL

## 2023-08-13 VITALS
OXYGEN SATURATION: 91 % | HEART RATE: 61 BPM | WEIGHT: 212 LBS | RESPIRATION RATE: 18 BRPM | DIASTOLIC BLOOD PRESSURE: 55 MMHG | HEIGHT: 65 IN | TEMPERATURE: 97 F | SYSTOLIC BLOOD PRESSURE: 98 MMHG | BODY MASS INDEX: 35.32 KG/M2

## 2023-08-13 LAB
ANION GAP SERPL CALC-SCNC: 13 MMOL/L (ref 7–16)
BUN SERPL-MCNC: 26 MG/DL (ref 8–22)
CALCIUM SERPL-MCNC: 9.5 MG/DL (ref 8.5–10.5)
CHLORIDE SERPL-SCNC: 100 MMOL/L (ref 96–112)
CHOLEST SERPL-MCNC: 87 MG/DL (ref 100–199)
CO2 SERPL-SCNC: 23 MMOL/L (ref 20–33)
CREAT SERPL-MCNC: 1.41 MG/DL (ref 0.5–1.4)
ERYTHROCYTE [DISTWIDTH] IN BLOOD BY AUTOMATED COUNT: 43.3 FL (ref 35.9–50)
GFR SERPLBLD CREATININE-BSD FMLA CKD-EPI: 61 ML/MIN/1.73 M 2
GLUCOSE BLD STRIP.AUTO-MCNC: 108 MG/DL (ref 65–99)
GLUCOSE BLD STRIP.AUTO-MCNC: 86 MG/DL (ref 65–99)
GLUCOSE SERPL-MCNC: 87 MG/DL (ref 65–99)
HCT VFR BLD AUTO: 43.7 % (ref 42–52)
HDLC SERPL-MCNC: 30 MG/DL
HGB BLD-MCNC: 14.6 G/DL (ref 14–18)
LDLC SERPL CALC-MCNC: 26 MG/DL
MCH RBC QN AUTO: 28.5 PG (ref 27–33)
MCHC RBC AUTO-ENTMCNC: 33.4 G/DL (ref 32.3–36.5)
MCV RBC AUTO: 85.4 FL (ref 81.4–97.8)
PLATELET # BLD AUTO: 326 K/UL (ref 164–446)
PMV BLD AUTO: 10.3 FL (ref 9–12.9)
POTASSIUM SERPL-SCNC: 4.2 MMOL/L (ref 3.6–5.5)
RBC # BLD AUTO: 5.12 M/UL (ref 4.7–6.1)
SODIUM SERPL-SCNC: 136 MMOL/L (ref 135–145)
TRIGL SERPL-MCNC: 157 MG/DL (ref 0–149)
WBC # BLD AUTO: 10.3 K/UL (ref 4.8–10.8)

## 2023-08-13 PROCEDURE — 80048 BASIC METABOLIC PNL TOTAL CA: CPT

## 2023-08-13 PROCEDURE — A9270 NON-COVERED ITEM OR SERVICE: HCPCS

## 2023-08-13 PROCEDURE — 82962 GLUCOSE BLOOD TEST: CPT

## 2023-08-13 PROCEDURE — 99239 HOSP IP/OBS DSCHRG MGMT >30: CPT | Performed by: HOSPITALIST

## 2023-08-13 PROCEDURE — 83036 HEMOGLOBIN GLYCOSYLATED A1C: CPT

## 2023-08-13 PROCEDURE — 85027 COMPLETE CBC AUTOMATED: CPT

## 2023-08-13 PROCEDURE — 80061 LIPID PANEL: CPT

## 2023-08-13 PROCEDURE — 700102 HCHG RX REV CODE 250 W/ 637 OVERRIDE(OP)

## 2023-08-13 PROCEDURE — G0378 HOSPITAL OBSERVATION PER HR: HCPCS

## 2023-08-13 PROCEDURE — RXMED WILLOW AMBULATORY MEDICATION CHARGE: Performed by: HOSPITALIST

## 2023-08-13 RX ORDER — OMEPRAZOLE 20 MG/1
20 CAPSULE, DELAYED RELEASE ORAL 2 TIMES DAILY
Qty: 60 CAPSULE | Refills: 0 | Status: SHIPPED | OUTPATIENT
Start: 2023-08-13 | End: 2023-09-12

## 2023-08-13 RX ADMIN — TAMSULOSIN HYDROCHLORIDE 0.4 MG: 0.4 CAPSULE ORAL at 09:41

## 2023-08-13 RX ADMIN — AMLODIPINE BESYLATE 10 MG: 10 TABLET ORAL at 06:15

## 2023-08-13 ASSESSMENT — PAIN DESCRIPTION - PAIN TYPE: TYPE: ACUTE PAIN

## 2023-08-13 NOTE — ASSESSMENT & PLAN NOTE
Patient with leukocytosis to 13 in ED without left shift. No evidence of acute infection, likely 2/2 hemoconcentration.     -See NASRA plan

## 2023-08-13 NOTE — DISCHARGE SUMMARY
"Discharge Summary    CHIEF COMPLAINT ON ADMISSION  Chief Complaint   Patient presents with    Chest Pain     Associated with shortness of breath, dizziness, headache, left eye pain and nausea that started 2 hours ago  Pain: 7/10       Reason for Admission  chest pain     Admission Date  8/12/2023    CODE STATUS  Full Code    HPI & HOSPITAL COURSE  As per unr h+p    Herrera Barros is a 47 y.o. male who presented with concern of chest pain.     Patient with intermittent chest pain starting 8/12 AM that was persistent. States it feels \"tight.\" Patient states it began when lifting heavy things and has continued since then. Patient believes he has worsening of pain with exertion. It is not worse with touch to the area. States he was sweaty and nauseous at work but that has resolved. Denies radiating pain to jaw, arm, or back. Has never had pain like this before.      No family history of early cardiac death.      Denies change of vision, dizziness, syncope, change of speech, difficulty with balance, weakness, swelling of legs, pain with inspiration, shortness of breath, nausea, vomiting, fever, chills.       This patient was monitored on telemetry.  There is no evidence of arrhythmia.  There is no recurrence of any chest pain.  Patient had serial troponins that were negative.  Patient was cleared for discharge home to follow with PCP for the current management patient given medication for his stomach  Therefore, he is discharged in good and stable condition to home with close outpatient follow-up.    The patient recovered much more quickly than anticipated on admission.    Discharge Date  8/13/2023    FOLLOW UP ITEMS POST DISCHARGE      DISCHARGE DIAGNOSES  Principal Problem:    Chest pain (POA: Yes)  Active Problems:    Essential hypertension (POA: Yes)    Leukocytosis (POA: Yes)    Dyslipidemia (POA: Yes)    Type 2 diabetes mellitus with diabetic peripheral angiopathy without gangrene, without long-term " current use of insulin (HCC) (POA: Yes)    NASRA (acute kidney injury) (HCC) (POA: Yes)  Resolved Problems:    * No resolved hospital problems. *      FOLLOW UP  No future appointments.  No follow-up provider specified.    MEDICATIONS ON DISCHARGE     Medication List        START taking these medications        Instructions   omeprazole 20 MG delayed-release capsule  Commonly known as: PriLOSEC   Take 1 Capsule by mouth 2 times a day for 30 days.  Dose: 20 mg            CONTINUE taking these medications        Instructions   amlodipine-benazepril 10-40 MG per capsule  Commonly known as: Lotrel   Take 1 capsule by mouth every day.  Dose: 1 Capsule     aspirin 81 MG Chew chewable tablet  Commonly known as: Asa   Chew 324 mg at bedtime. 324 MG= 4 TABS  Dose: 324 mg     atorvastatin 80 MG tablet  Commonly known as: Lipitor   Take 1 Tab by mouth every evening.  Dose: 80 mg     CALCIUM GUMMIES PO   Take 2 Tablets by mouth every day.  Dose: 2 Tablet     cetirizine 10 MG Tabs  Commonly known as: ZyrTEC   Take 10 mg by mouth every evening.  Dose: 10 mg     metFORMIN 500 MG Tabs  Commonly known as: Glucophage   Take 500 mg by mouth 2 times a day.  Dose: 500 mg     Mounjaro 2.5 MG/0.5ML Sopn  Generic drug: Tirzepatide   Inject 2.5 mg under the skin every 7 days.  Dose: 2.5 mg     tamsulosin 0.4 MG capsule  Commonly known as: Flomax   Take 1 Cap by mouth 1/2 hour after breakfast.  Dose: 0.4 mg              Allergies  No Known Allergies    DIET  Orders Placed This Encounter   Procedures    Diet Order Diet: Cardiac; Second Modifier: (optional): Consistent CHO (Diabetic)     Standing Status:   Standing     Number of Occurrences:   1     Order Specific Question:   Diet:     Answer:   Cardiac [6]     Order Specific Question:   Second Modifier: (optional)     Answer:   Consistent CHO (Diabetic) [4]       ACTIVITY  As tolerated.  Weight bearing as tolerated    CONSULTATIONS      PROCEDURES      LABORATORY  Lab Results   Component Value  Date    SODIUM 136 08/13/2023    POTASSIUM 4.2 08/13/2023    CHLORIDE 100 08/13/2023    CO2 23 08/13/2023    GLUCOSE 87 08/13/2023    BUN 26 (H) 08/13/2023    CREATININE 1.41 (H) 08/13/2023        Lab Results   Component Value Date    WBC 10.3 08/13/2023    HEMOGLOBIN 14.6 08/13/2023    HEMATOCRIT 43.7 08/13/2023    PLATELETCT 326 08/13/2023        Total time of the discharge process exceeds 37  minutes.

## 2023-08-13 NOTE — PROGRESS NOTES
4 Eyes Skin Assessment Completed by KATHERYN Fair and KATHERYN Shi.    Head WDL  Ears WDL  Nose WDL  Mouth WDL  Neck WDL  Breast/Chest WDL  Shoulder Blades WDL  Spine WDL  (R) Arm/Elbow/Hand WDL  (L) Arm/Elbow/Hand WDL  Abdomen WDL  Groin WDL  Scrotum/Coccyx/Buttocks WDL  (R) Leg WDL  (L) Leg WDL  (R) Heel/Foot/Toe WDL  (L) Heel/Foot/Toe WDL          Devices In Places Tele Box and Pulse Ox      Interventions In Place N/A    Possible Skin Injury No    Pictures Uploaded Into Epic N/A  Wound Consult Placed N/A  RN Wound Prevention Protocol Ordered No

## 2023-08-13 NOTE — CARE PLAN
The patient is Stable - Low risk of patient condition declining or worsening    Shift Goals  Clinical Goals: tele monitoring  Patient Goals: feel better  Family Goals: feel better    Progress made toward(s) clinical / shift goals:        Problem: Knowledge Deficit - Standard  Goal: Patient and family/care givers will demonstrate understanding of plan of care, disease process/condition, diagnostic tests and medications  Outcome: Progressing       Patient is not progressing towards the following goals:

## 2023-08-13 NOTE — ASSESSMENT & PLAN NOTE
Patient with Cr of 1.8 from baseline 0.6-0.9. With BUN:Cr of 17. Likely prerenal injury. Patient with concern for ACS. In setting of r/o ACS and patient troponin down trending likely to not need PCI or further intervention.     -If f/u troponin is continuing to downtrend, will allow patient to hydrate orally.   -Repeat BMP in AM

## 2023-08-13 NOTE — CARE PLAN
The patient is Stable - Low risk of patient condition declining or worsening    Shift Goals  Clinical Goals: no CP  Patient Goals: feel better  Family Goals: feel better    Progress made toward(s) clinical / shift goals:  pt cleared to dc to home, no episodes of CP, VSS, chest xray negative    Patient is not progressing towards the following goals:      Problem: Knowledge Deficit - Standard  Goal: Patient and family/care givers will demonstrate understanding of plan of care, disease process/condition, diagnostic tests and medications  Description: Target End Date:  1-3 days or as soon as patient condition allows    Document in Patient Education    1.  Patient and family/caregiver oriented to unit, equipment, visitation policy and means for communicating concern  2.  Complete/review Learning Assessment  3.  Assess knowledge level of disease process/condition, treatment plan, diagnostic tests and medications  4.  Explain disease process/condition, treatment plan, diagnostic tests and medications  Outcome: Met     Problem: Pain - Standard  Goal: Alleviation of pain or a reduction in pain to the patient’s comfort goal  Description: Target End Date:  Prior to discharge or change in level of care    Document on Vitals flowsheet    1.  Document pain using the appropriate pain scale per order or unit policy  2.  Educate and implement non-pharmacologic comfort measures (i.e. relaxation, distraction, massage, cold/heat therapy, etc.)  3.  Pain management medications as ordered  4.  Reassess pain after pain med administration per policy  5.  If opiods administered assess patient's response to pain medication is appropriate per POSS sedation scale  6.  Follow pain management plan developed in collaboration with patient and interdisciplinary team (including palliative care or pain specialists if applicable)  Outcome: Met

## 2023-08-13 NOTE — PROGRESS NOTES
Pt cleared to dc to home, denies any pain, VSS. Educated pt and SO on dc instructions and new medication regimen with translation. Meds to beds delivered, IV removed, tele box off, pt waiting for daughter to come pick him up. Pt verbalizes understanding of dc instructions, answered questions, pt cleared to dc

## 2023-08-13 NOTE — ASSESSMENT & PLAN NOTE
Patient with heart score of 5 given prior TIA with removal of right carotid thrombosis, DM, HLD, HTN. Unlikely ACS given ECG and labs; however at high risk of ACS so admitted to observation. Troponin trending down.     -Trend troponin x3, if down trending will discontinue   -NPO until troponin down trending with concern for need of intervention   -Will consider consult to cardiology if concern for need for intervention arises   -ECG for chest pain

## 2023-08-13 NOTE — PROGRESS NOTES
Assessment completed. Pt A&Ox4. Portuguese speaking, able to communicate with this RN. Respirations are even and unlabored on RA. Pt denies pain at this time. Monitors applied, VS stable, call light and belongings within reach. POC updated (NPO for possible procedure). Pt educated on room and call light, pt verbalized understanding. Communication board updated. Needs met. Wife at bedside.

## 2023-08-13 NOTE — ASSESSMENT & PLAN NOTE
Patient with last A1c in 04/2021 of 6.4 from chart review. On    -Holding home DM meds  -LDSSI  -A1c in AM

## 2023-08-15 LAB
EST. AVERAGE GLUCOSE BLD GHB EST-MCNC: 148 MG/DL
HBA1C MFR BLD: 6.8 % (ref 4–5.6)

## 2023-10-11 ENCOUNTER — HOSPITAL ENCOUNTER (EMERGENCY)
Facility: MEDICAL CENTER | Age: 48
End: 2023-10-11
Attending: EMERGENCY MEDICINE
Payer: OTHER MISCELLANEOUS

## 2023-10-11 ENCOUNTER — APPOINTMENT (OUTPATIENT)
Dept: RADIOLOGY | Facility: MEDICAL CENTER | Age: 48
End: 2023-10-11
Attending: EMERGENCY MEDICINE
Payer: OTHER MISCELLANEOUS

## 2023-10-11 VITALS
HEART RATE: 98 BPM | HEIGHT: 65 IN | OXYGEN SATURATION: 90 % | SYSTOLIC BLOOD PRESSURE: 111 MMHG | TEMPERATURE: 101 F | BODY MASS INDEX: 31.92 KG/M2 | RESPIRATION RATE: 18 BRPM | WEIGHT: 191.58 LBS | DIASTOLIC BLOOD PRESSURE: 59 MMHG

## 2023-10-11 DIAGNOSIS — R10.9 ABDOMINAL PAIN, UNSPECIFIED ABDOMINAL LOCATION: ICD-10-CM

## 2023-10-11 DIAGNOSIS — R19.7 DIARRHEA OF PRESUMED INFECTIOUS ORIGIN: ICD-10-CM

## 2023-10-11 LAB
ALBUMIN SERPL BCP-MCNC: 4.6 G/DL (ref 3.2–4.9)
ALBUMIN/GLOB SERPL: 1.3 G/DL
ALP SERPL-CCNC: 71 U/L (ref 30–99)
ALT SERPL-CCNC: 25 U/L (ref 2–50)
ANION GAP SERPL CALC-SCNC: 17 MMOL/L (ref 7–16)
AST SERPL-CCNC: 19 U/L (ref 12–45)
BASOPHILS # BLD AUTO: 0.1 % (ref 0–1.8)
BASOPHILS # BLD: 0.03 K/UL (ref 0–0.12)
BILIRUB SERPL-MCNC: 0.6 MG/DL (ref 0.1–1.5)
BUN SERPL-MCNC: 22 MG/DL (ref 8–22)
CALCIUM ALBUM COR SERPL-MCNC: 8.8 MG/DL (ref 8.5–10.5)
CALCIUM SERPL-MCNC: 9.3 MG/DL (ref 8.5–10.5)
CHLORIDE SERPL-SCNC: 101 MMOL/L (ref 96–112)
CO2 SERPL-SCNC: 14 MMOL/L (ref 20–33)
CREAT SERPL-MCNC: 1.45 MG/DL (ref 0.5–1.4)
EOSINOPHIL # BLD AUTO: 0.02 K/UL (ref 0–0.51)
EOSINOPHIL NFR BLD: 0.1 % (ref 0–6.9)
ERYTHROCYTE [DISTWIDTH] IN BLOOD BY AUTOMATED COUNT: 45.1 FL (ref 35.9–50)
GFR SERPLBLD CREATININE-BSD FMLA CKD-EPI: 59 ML/MIN/1.73 M 2
GLOBULIN SER CALC-MCNC: 3.6 G/DL (ref 1.9–3.5)
GLUCOSE SERPL-MCNC: 155 MG/DL (ref 65–99)
HCT VFR BLD AUTO: 49.7 % (ref 42–52)
HGB BLD-MCNC: 15.9 G/DL (ref 14–18)
IMM GRANULOCYTES # BLD AUTO: 0.16 K/UL (ref 0–0.11)
IMM GRANULOCYTES NFR BLD AUTO: 0.7 % (ref 0–0.9)
LIPASE SERPL-CCNC: 68 U/L (ref 11–82)
LYMPHOCYTES # BLD AUTO: 0.97 K/UL (ref 1–4.8)
LYMPHOCYTES NFR BLD: 4.3 % (ref 22–41)
MCH RBC QN AUTO: 27.5 PG (ref 27–33)
MCHC RBC AUTO-ENTMCNC: 32 G/DL (ref 32.3–36.5)
MCV RBC AUTO: 85.8 FL (ref 81.4–97.8)
MONOCYTES # BLD AUTO: 1.17 K/UL (ref 0–0.85)
MONOCYTES NFR BLD AUTO: 5.2 % (ref 0–13.4)
NEUTROPHILS # BLD AUTO: 20.06 K/UL (ref 1.82–7.42)
NEUTROPHILS NFR BLD: 89.6 % (ref 44–72)
NRBC # BLD AUTO: 0 K/UL
NRBC BLD-RTO: 0 /100 WBC (ref 0–0.2)
PLATELET # BLD AUTO: 349 K/UL (ref 164–446)
PMV BLD AUTO: 10.3 FL (ref 9–12.9)
POTASSIUM SERPL-SCNC: 5.3 MMOL/L (ref 3.6–5.5)
PROT SERPL-MCNC: 8.2 G/DL (ref 6–8.2)
RBC # BLD AUTO: 5.79 M/UL (ref 4.7–6.1)
SODIUM SERPL-SCNC: 132 MMOL/L (ref 135–145)
WBC # BLD AUTO: 22.4 K/UL (ref 4.8–10.8)

## 2023-10-11 PROCEDURE — 74177 CT ABD & PELVIS W/CONTRAST: CPT

## 2023-10-11 PROCEDURE — 80053 COMPREHEN METABOLIC PANEL: CPT

## 2023-10-11 PROCEDURE — 700102 HCHG RX REV CODE 250 W/ 637 OVERRIDE(OP): Performed by: EMERGENCY MEDICINE

## 2023-10-11 PROCEDURE — 99284 EMERGENCY DEPT VISIT MOD MDM: CPT

## 2023-10-11 PROCEDURE — 85025 COMPLETE CBC W/AUTO DIFF WBC: CPT

## 2023-10-11 PROCEDURE — 36415 COLL VENOUS BLD VENIPUNCTURE: CPT

## 2023-10-11 PROCEDURE — 83690 ASSAY OF LIPASE: CPT

## 2023-10-11 PROCEDURE — A9270 NON-COVERED ITEM OR SERVICE: HCPCS | Performed by: EMERGENCY MEDICINE

## 2023-10-11 PROCEDURE — 700117 HCHG RX CONTRAST REV CODE 255: Performed by: EMERGENCY MEDICINE

## 2023-10-11 RX ORDER — ACETAMINOPHEN 325 MG/1
650 TABLET ORAL ONCE
Status: COMPLETED | OUTPATIENT
Start: 2023-10-11 | End: 2023-10-11

## 2023-10-11 RX ADMIN — IOHEXOL 100 ML: 350 INJECTION, SOLUTION INTRAVENOUS at 09:32

## 2023-10-11 RX ADMIN — ACETAMINOPHEN 650 MG: 325 TABLET, FILM COATED ORAL at 10:30

## 2023-10-11 ASSESSMENT — FIBROSIS 4 INDEX: FIB4 SCORE: 0.69

## 2023-10-11 ASSESSMENT — PAIN DESCRIPTION - DESCRIPTORS: DESCRIPTORS: CRAMPING

## 2023-10-11 NOTE — ED TRIAGE NOTES
"Chief Complaint   Patient presents with    Abdominal Pain     Abd pain since yesterday, pain worsened this AM severe. Pt also endorses of shivering, nausea and diarrhea      /79   Pulse 90   Temp 36.5 °C (97.7 °F) (Temporal)   Resp 18   Ht 1.651 m (5' 5\")   Wt 86.9 kg (191 lb 9.3 oz)   SpO2 98%   BMI 31.88 kg/m²     Pt here w/ daughter for above cc  Abd pain since yesterday w/ diarrhea  Pain worsened this AM 10/10, mid abd pain/cramping     Abd pain protocol ordered  Pt to phleb for lab draw    Pt provided urine cup  "

## 2023-10-11 NOTE — ED PROVIDER NOTES
ER Provider Note    Scribed for Ric Urrutia M.D. by Denise Jasso. 10/11/2023   8:52 AM    Primary Care Provider: Jermaine Mckenzie M.D.    CHIEF COMPLAINT  Chief Complaint   Patient presents with    Abdominal Pain     Abd pain since yesterday, pain worsened this AM severe. Pt also endorses of shivering, nausea and diarrhea      EXTERNAL RECORDS REVIEWED  Outpatient Notes: Review of records shows history of kidney stones and umbilical hernia repair.    HPI/ROS  LIMITATION TO HISTORY   Select: : None  OUTSIDE HISTORIAN(S):  Family Herrera Barros is a 47 y.o. male who presents to the ED for evaluation of right lower abdominal pain onset yesterday, but worsening this morning. He reports associated fever, nausea and diarrhea, but denies any vomiting. No alleviating factors attempted.     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    Hypertension     Renal disorder 2020    kidney stones    Snoring        SURGICAL HISTORY  Past Surgical History:   Procedure Laterality Date    WY THROMBOENDARTECTMY NECK,NECK INCIS Right 1/27/2021    Procedure: THROMBECTOMY RIGHT CAROTID ARTERY;  Surgeon: Joelle Bella M.D.;  Location: [facility];  Service: General    ANGIOGRAM Right 1/27/2021    Procedure: ANGIOGRAM RIGHT CAROTID;  Surgeon: Joelle Bella M.D.;  Location: [facility];  Service: General    VENTRAL HERNIA REPAIR ROBOTIC XI  11/3/2020    Procedure: REPAIR, HERNIA, VENTRAL, ROBOT-ASSISTED, USING DA FERNIE XI- UMBILICAL WITH MESH;  Surgeon: Tamir Hall M.D.;  Location: [facility];  Service: Gen Robotic    WY CYSTOSCOPY,INSERT URETERAL STENT  10/12/2020    Procedure: CYSTOSCOPY, WITH URETERAL STENT INSERTION;  Surgeon: Chad Lucas M.D.;  Location: [facility];  Service: Urology    WY CYSTO/URETERO/PYELOSCOPY, DX  10/12/2020    Procedure: URETEROSCOPY;  Surgeon: Chad Lucas,  "M.D.;  Location: SUR[facility]ERY [facility];  Service: Urology    OTHER ORTHOPEDIC SURGERY Left 2001    knee tendon       FAMILY HISTORY  Family History   Problem Relation Age of Onset    Hypertension Mother     Hypertension Father        SOCIAL HISTORY   reports that he has never smoked. He has never used smokeless tobacco. He reports current alcohol use. He reports that he does not use drugs.    CURRENT MEDICATIONS  Previous Medications    AMLODIPINE-BENAZEPRIL (LOTREL) 10-40 MG PER CAPSULE    Take 1 capsule by mouth every day.    ASPIRIN (ASA) 81 MG CHEW TAB CHEWABLE TABLET    Chew 324 mg at bedtime. 324 MG= 4 TABS    ATORVASTATIN (LIPITOR) 80 MG TABLET    Take 1 Tab by mouth every evening.    CALCIUM-PHOSPHORUS-VITAMIN D (CALCIUM GUMMIES PO)    Take 2 Tablets by mouth every day.    CETIRIZINE (ZYRTEC) 10 MG TAB    Take 10 mg by mouth every evening.    METFORMIN (GLUCOPHAGE) 500 MG TAB    Take 500 mg by mouth 2 times a day.    TAMSULOSIN (FLOMAX) 0.4 MG CAPSULE    Take 1 Cap by mouth 1/2 hour after breakfast.    TIRZEPATIDE (MOUNJARO) 2.5 MG/0.5ML SOLUTION PEN-INJECTOR    Inject 2.5 mg under the skin every 7 days.       ALLERGIES  No Known Allergies     PHYSICAL EXAM  /79   Pulse 90   Temp 36.5 °C (97.7 °F) (Temporal)   Resp 18   Ht 1.651 m (5' 5\")   Wt 86.9 kg (191 lb 9.3 oz)   SpO2 98%   BMI 31.88 kg/m²      Nursing note and vitals reviewed.  Constitutional: Well-developed and well-nourished. No distress.   HENT: Head is normocephalic and atraumatic. Oropharynx is clear and moist without exudate or erythema.   Eyes: Pupils are equal, round, and reactive to light. Conjunctiva are normal.   Cardiovascular: Normal rate and regular rhythm. No murmur heard. Normal radial pulses.  Pulmonary/Chest: Breath sounds normal. No wheezes or rales.   Abdominal: Soft and right lower quadrant tenderness. No rebound. No distention    Musculoskeletal: Extremities exhibit normal range of motion without edema or tenderness. "   Neurological: Awake, alert and oriented to person, place, and time. No focal deficits noted.  Skin: Skin is warm and dry. No rash.   Psychiatric: Normal mood and affect. Appropriate for clinical situation    DIAGNOSTIC STUDIES    Labs:   Results for orders placed or performed during the hospital encounter of 10/11/23   CBC WITH DIFFERENTIAL   Result Value Ref Range    WBC 22.4 (H) 4.8 - 10.8 K/uL    RBC 5.79 4.70 - 6.10 M/uL    Hemoglobin 15.9 14.0 - 18.0 g/dL    Hematocrit 49.7 42.0 - 52.0 %    MCV 85.8 81.4 - 97.8 fL    MCH 27.5 27.0 - 33.0 pg    MCHC 32.0 (L) 32.3 - 36.5 g/dL    RDW 45.1 35.9 - 50.0 fL    Platelet Count 349 164 - 446 K/uL    MPV 10.3 9.0 - 12.9 fL    Neutrophils-Polys 89.60 (H) 44.00 - 72.00 %    Lymphocytes 4.30 (L) 22.00 - 41.00 %    Monocytes 5.20 0.00 - 13.40 %    Eosinophils 0.10 0.00 - 6.90 %    Basophils 0.10 0.00 - 1.80 %    Immature Granulocytes 0.70 0.00 - 0.90 %    Nucleated RBC 0.00 0.00 - 0.20 /100 WBC    Neutrophils (Absolute) 20.06 (H) 1.82 - 7.42 K/uL    Lymphs (Absolute) 0.97 (L) 1.00 - 4.80 K/uL    Monos (Absolute) 1.17 (H) 0.00 - 0.85 K/uL    Eos (Absolute) 0.02 0.00 - 0.51 K/uL    Baso (Absolute) 0.03 0.00 - 0.12 K/uL    Immature Granulocytes (abs) 0.16 (H) 0.00 - 0.11 K/uL    NRBC (Absolute) 0.00 K/uL   COMP METABOLIC PANEL   Result Value Ref Range    Sodium 132 (L) 135 - 145 mmol/L    Potassium 5.3 3.6 - 5.5 mmol/L    Chloride 101 96 - 112 mmol/L    Co2 14 (L) 20 - 33 mmol/L    Anion Gap 17.0 (H) 7.0 - 16.0    Glucose 155 (H) 65 - 99 mg/dL    Bun 22 8 - 22 mg/dL    Creatinine 1.45 (H) 0.50 - 1.40 mg/dL    Calcium 9.3 8.5 - 10.5 mg/dL    Correct Calcium 8.8 8.5 - 10.5 mg/dL    AST(SGOT) 19 12 - 45 U/L    ALT(SGPT) 25 2 - 50 U/L    Alkaline Phosphatase 71 30 - 99 U/L    Total Bilirubin 0.6 0.1 - 1.5 mg/dL    Albumin 4.6 3.2 - 4.9 g/dL    Total Protein 8.2 6.0 - 8.2 g/dL    Globulin 3.6 (H) 1.9 - 3.5 g/dL    A-G Ratio 1.3 g/dL   LIPASE   Result Value Ref Range    Lipase 68 11  - 82 U/L   ESTIMATED GFR   Result Value Ref Range    GFR (CKD-EPI) 59 (A) >60 mL/min/1.73 m 2     Radiology:   This attending emergency physician has independently interpreted the diagnostic imaging associated with this visit and is awaiting the final reading from the radiologist.   Preliminary interpretation is a follows:     Radiologist interpretation:   CT-ABDOMEN-PELVIS WITH   Final Result      1.  No acute abnormality of the abdomen or pelvis.   2.  6 mm nonobstructive right renal stone.   3.  Mild hepatomegaly.   4.  Small fat-containing bilateral inguinal hernias.           INITIAL ASSESSMENT AND PLAN    8:52 AM - Patient was evaluated at bedside with family for abdominal pain. Ordered for CT-Abdomen- Pelvis, CBC with diff, CMP, Lipase and UA to evaluate. Patient verbalizes understanding and support with my plan of care.  Differential diagnoses include but not limited to: appendicitis, kidney stone, viral syndrome, UTI, colitis, diverticulitis.     ED Observation Status? Yes; I am placing the patient in to an observation status due to a diagnostic uncertainty as well as therapeutic intensity. Patient placed in observation status at 8:58 AM, 10/11/2023.     Observation plan is as follows: no acute abnormality    Upon Reevaluation, the patient's condition has: Improved; and will be discharged.    Patient discharged from ED Observation status at 9:50 AM (Time) 10/11/2023 (Date).      COURSE AND MEDICAL DECISION MAKING  9:51 AM - The patient has chronic renal insufficeny consistent with prior. WBC elevated at 22. CT scan shows no acute abnormality.  Normal appendix. The patient is stable for discharge home. Patient verbalizes understanding and agreement to this plan of care.       DISPOSITION AND DISCUSSIONS    I have discussed management of the patient with the following physicians and NOA's:  None noted    Discussion of management with other QHP or appropriate source(s): None     Escalation of care considered,  and ultimately not performed: acute inpatient care management, however at this time, the patient is most appropriate for outpatient management.    Barriers to care at this time, including but not limited to:  None noted .     Decision tools and prescription drugs considered including, but not limited to: Antibiotics I considered prescribing antibiotics, however I have not identified a bacterial source of infection.    The patient will return for new or worsening symptoms and is stable at the time of discharge.    DISPOSITION:  Patient will be discharged home in stable condition.    FOLLOW UP:  Jermaine Mckenzie M.D.  [address]  [place] [address]  [phone]    Schedule an appointment as soon as possible for a visit       [facility], Emergency Dept  [address] [address] [phone]    If symptoms worsen    FINAL DIAGNOSIS  1. Abdominal pain, unspecified abdominal location    2. Diarrhea of presumed infectious origin         IDenise (Scribe), am scribing for, and in the presence of, Ric Urrutia M.D..    Electronically signed by: Denise Jasso (Ezraibe), 10/11/2023    IRic M.D. personally performed the services described in this documentation, as scribed by Denise Jasso in my presence, and it is both accurate and complete.      The note accurately reflects work and decisions made by me.  Ric Urrutia M.D.  10/11/2023  1:03 PM

## 2023-10-11 NOTE — ED NOTES
Patient discharged from [facility] ED to home with self. Discharge teaching completed at bedside and patient signature obtained. All questions and concerns addressed. PIV removed. All pt belongings with pt at time of discharge.

## 2023-10-11 NOTE — ED NOTES
Pt ambulatory to red 08. Changed into gown, connected to cardiac monitor. 20L AC placed by this RN. Chart up for ERP.

## 2024-01-10 ENCOUNTER — HOSPITAL ENCOUNTER (EMERGENCY)
Facility: MEDICAL CENTER | Age: 49
End: 2024-01-10
Attending: EMERGENCY MEDICINE
Payer: OTHER MISCELLANEOUS

## 2024-01-10 ENCOUNTER — APPOINTMENT (OUTPATIENT)
Dept: RADIOLOGY | Facility: MEDICAL CENTER | Age: 49
End: 2024-01-10
Attending: EMERGENCY MEDICINE
Payer: OTHER MISCELLANEOUS

## 2024-01-10 VITALS
RESPIRATION RATE: 18 BRPM | HEART RATE: 73 BPM | SYSTOLIC BLOOD PRESSURE: 120 MMHG | DIASTOLIC BLOOD PRESSURE: 74 MMHG | BODY MASS INDEX: 31.78 KG/M2 | WEIGHT: 191 LBS | TEMPERATURE: 98.4 F | OXYGEN SATURATION: 94 %

## 2024-01-10 DIAGNOSIS — R20.2 PARESTHESIAS: ICD-10-CM

## 2024-01-10 LAB
ABO + RH BLD: NORMAL
ABO GROUP BLD: NORMAL
ALBUMIN SERPL BCP-MCNC: 4.8 G/DL (ref 3.2–4.9)
ALBUMIN/GLOB SERPL: 1.6 G/DL
ALP SERPL-CCNC: 71 U/L (ref 30–99)
ALT SERPL-CCNC: 26 U/L (ref 2–50)
ANION GAP SERPL CALC-SCNC: 13 MMOL/L (ref 7–16)
APTT PPP: 26.4 SEC (ref 24.7–36)
AST SERPL-CCNC: 20 U/L (ref 12–45)
BASOPHILS # BLD AUTO: 0.3 % (ref 0–1.8)
BASOPHILS # BLD: 0.04 K/UL (ref 0–0.12)
BILIRUB SERPL-MCNC: 0.6 MG/DL (ref 0.1–1.5)
BLD GP AB SCN SERPL QL: NORMAL
BUN SERPL-MCNC: 14 MG/DL (ref 8–22)
CALCIUM ALBUM COR SERPL-MCNC: 8.6 MG/DL (ref 8.5–10.5)
CALCIUM SERPL-MCNC: 9.2 MG/DL (ref 8.5–10.5)
CHLORIDE SERPL-SCNC: 102 MMOL/L (ref 96–112)
CO2 SERPL-SCNC: 22 MMOL/L (ref 20–33)
CREAT SERPL-MCNC: 0.78 MG/DL (ref 0.5–1.4)
EKG IMPRESSION: NORMAL
EOSINOPHIL # BLD AUTO: 0.14 K/UL (ref 0–0.51)
EOSINOPHIL NFR BLD: 1.2 % (ref 0–6.9)
ERYTHROCYTE [DISTWIDTH] IN BLOOD BY AUTOMATED COUNT: 44.5 FL (ref 35.9–50)
GFR SERPLBLD CREATININE-BSD FMLA CKD-EPI: 109 ML/MIN/1.73 M 2
GLOBULIN SER CALC-MCNC: 3 G/DL (ref 1.9–3.5)
GLUCOSE SERPL-MCNC: 89 MG/DL (ref 65–99)
HCT VFR BLD AUTO: 42.9 % (ref 42–52)
HGB BLD-MCNC: 14.2 G/DL (ref 14–18)
IMM GRANULOCYTES # BLD AUTO: 0.04 K/UL (ref 0–0.11)
IMM GRANULOCYTES NFR BLD AUTO: 0.3 % (ref 0–0.9)
INR PPP: 1.06 (ref 0.87–1.13)
LYMPHOCYTES # BLD AUTO: 3.66 K/UL (ref 1–4.8)
LYMPHOCYTES NFR BLD: 31.3 % (ref 22–41)
MCH RBC QN AUTO: 28.1 PG (ref 27–33)
MCHC RBC AUTO-ENTMCNC: 33.1 G/DL (ref 32.3–36.5)
MCV RBC AUTO: 84.8 FL (ref 81.4–97.8)
MONOCYTES # BLD AUTO: 0.79 K/UL (ref 0–0.85)
MONOCYTES NFR BLD AUTO: 6.7 % (ref 0–13.4)
NEUTROPHILS # BLD AUTO: 7.04 K/UL (ref 1.82–7.42)
NEUTROPHILS NFR BLD: 60.2 % (ref 44–72)
NRBC # BLD AUTO: 0 K/UL
NRBC BLD-RTO: 0 /100 WBC (ref 0–0.2)
PLATELET # BLD AUTO: 374 K/UL (ref 164–446)
PMV BLD AUTO: 10.2 FL (ref 9–12.9)
POTASSIUM SERPL-SCNC: 3.9 MMOL/L (ref 3.6–5.5)
PROT SERPL-MCNC: 7.8 G/DL (ref 6–8.2)
PROTHROMBIN TIME: 13.9 SEC (ref 12–14.6)
RBC # BLD AUTO: 5.06 M/UL (ref 4.7–6.1)
RH BLD: NORMAL
SODIUM SERPL-SCNC: 137 MMOL/L (ref 135–145)
TROPONIN T SERPL-MCNC: 8 NG/L (ref 6–19)
WBC # BLD AUTO: 11.7 K/UL (ref 4.8–10.8)

## 2024-01-10 PROCEDURE — 86901 BLOOD TYPING SEROLOGIC RH(D): CPT

## 2024-01-10 PROCEDURE — 700117 HCHG RX CONTRAST REV CODE 255: Performed by: EMERGENCY MEDICINE

## 2024-01-10 PROCEDURE — 71045 X-RAY EXAM CHEST 1 VIEW: CPT

## 2024-01-10 PROCEDURE — 86850 RBC ANTIBODY SCREEN: CPT

## 2024-01-10 PROCEDURE — 70496 CT ANGIOGRAPHY HEAD: CPT

## 2024-01-10 PROCEDURE — 85025 COMPLETE CBC W/AUTO DIFF WBC: CPT

## 2024-01-10 PROCEDURE — 86900 BLOOD TYPING SEROLOGIC ABO: CPT

## 2024-01-10 PROCEDURE — 36415 COLL VENOUS BLD VENIPUNCTURE: CPT

## 2024-01-10 PROCEDURE — 70450 CT HEAD/BRAIN W/O DYE: CPT

## 2024-01-10 PROCEDURE — 85610 PROTHROMBIN TIME: CPT

## 2024-01-10 PROCEDURE — 84484 ASSAY OF TROPONIN QUANT: CPT

## 2024-01-10 PROCEDURE — 80053 COMPREHEN METABOLIC PANEL: CPT

## 2024-01-10 PROCEDURE — 0042T CT-CEREBRAL PERFUSION ANALYSIS: CPT

## 2024-01-10 PROCEDURE — 70498 CT ANGIOGRAPHY NECK: CPT

## 2024-01-10 PROCEDURE — 93005 ELECTROCARDIOGRAM TRACING: CPT | Performed by: EMERGENCY MEDICINE

## 2024-01-10 PROCEDURE — 99284 EMERGENCY DEPT VISIT MOD MDM: CPT

## 2024-01-10 PROCEDURE — 85730 THROMBOPLASTIN TIME PARTIAL: CPT

## 2024-01-10 RX ORDER — CLOPIDOGREL BISULFATE 75 MG/1
75 TABLET ORAL DAILY
Qty: 30 TABLET | Refills: 2 | Status: SHIPPED | OUTPATIENT
Start: 2024-01-10 | End: 2024-04-09

## 2024-01-10 RX ADMIN — IOHEXOL 85 ML: 350 INJECTION, SOLUTION INTRAVENOUS at 11:15

## 2024-01-10 RX ADMIN — IOHEXOL 40 ML: 350 INJECTION, SOLUTION INTRAVENOUS at 11:15

## 2024-01-10 ASSESSMENT — FIBROSIS 4 INDEX: FIB4 SCORE: 0.54

## 2024-01-10 NOTE — ED TRIAGE NOTES
49 y/o male BIBA from work. At 0910 pt had onset left arm tingling. Rivera no weakness. No facial droop or slurred speech. A&O x 4 with gcs 15. FSBS 99 pta.   Pt on arrival at 1014 continues to have tingling to left arm. Labs collected and pt to imagine.  Report to Shima Mccarthy RN

## 2024-01-10 NOTE — ED NOTES
Discharge instructions given to pt. Prescriptions sent to pt's pharmacy. Pt educated, verbalizes understanding. All belongings accounted for. Pt to ambulate out of ED with steady gait to go home with family at side. PIV removed and dressing applied.

## 2024-01-10 NOTE — ED PROVIDER NOTES
ED Provider Note    CHIEF COMPLAINT  Chief Complaint   Patient presents with    Tingling     Left arm tingling onset 0910    Headache     Intermittent x 1 day   Left-sided numbness    EXTERNAL RECORDS REVIEWED  Other reviewed a discharge summary from August 13, 2023 when the patient was admitted to the chest pain unit for chest discomfort.  He was ruled out enzymatically.  I also reviewed a discharge summary from 1 February 2021 it was noted the patient was admitted for COVID secondary to hypoxemic respiratory failure.  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 Bella..    HPI/ROS  LIMITATION TO HISTORY   Select:  was used at the bedside,  Used   OUTSIDE HISTORIAN(S):  EMS presents with the patient and provides history    Herrera Barros is a 48 y.o. male who presents with left-sided numbness.  This started around 910 this morning.  The patient noted the left side of his body was not feeling right while at work.  Per EMS the patient had a stroke in 2021 and is currently on aspirin.  He does not have any motor dysfunction.  He states he does have a slight headache.  He has not had any associated difficulty with speech nor vision.  He presents with continued numbness to his left side that excludes his face.    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    SURGICAL HISTORY   has a past surgical history that includes other orthopedic surgery (Left, 2001); cystoscopy,insert ureteral stent (10/12/2020); cysto/uretero/pyeloscopy, dx (10/12/2020); ventral hernia repair robotic xi (11/3/2020); thromboendartectmy neck,neck incis (Right, 1/27/2021); and angiogram (Right, 1/27/2021).    FAMILY HISTORY  Family History   Problem Relation Age of Onset    Hypertension Mother     Hypertension Father        SOCIAL  HISTORY  Social History     Tobacco Use    Smoking status: Never    Smokeless tobacco: Never   Vaping Use    Vaping Use: Never used   Substance and Sexual Activity    Alcohol use: Yes     Comment: 1 per month    Drug use: No    Sexual activity: Not on file       CURRENT MEDICATIONS  Home Medications    **Home medications have not yet been reviewed for this encounter**         ALLERGIES  No Known Allergies    PHYSICAL EXAM  VITAL SIGNS: /53   Pulse 69   Resp 16   Wt 86.6 kg (191 lb)   SpO2 97%   BMI 31.78 kg/m²    In general the patient does not appear toxic    HEENT unremarkable    Pulmonary the patient's lungs are clear to auscultation bilaterally    Cardiovascular S1-S2 with a regular rate and rhythm    GI abdomen soft    Skin no rashes, pallor, no jaundice    Extremities no distal edema no evidence of trauma    Neurologic examination cranials 2 through 12 are intact, motor is 5 out of 5 and symmetric throughout, the patient does have subjective numbness to the left upper and lower extremity and this is his only deficit with an NIH stroke scale of 1.  Please see my NIH stroke scale worksheet.    DIAGNOSTIC STUDIES   Results for orders placed or performed during the hospital encounter of 01/10/24   CBC WITH DIFFERENTIAL   Result Value Ref Range    WBC 11.7 (H) 4.8 - 10.8 K/uL    RBC 5.06 4.70 - 6.10 M/uL    Hemoglobin 14.2 14.0 - 18.0 g/dL    Hematocrit 42.9 42.0 - 52.0 %    MCV 84.8 81.4 - 97.8 fL    MCH 28.1 27.0 - 33.0 pg    MCHC 33.1 32.3 - 36.5 g/dL    RDW 44.5 35.9 - 50.0 fL    Platelet Count 374 164 - 446 K/uL    MPV 10.2 9.0 - 12.9 fL    Neutrophils-Polys 60.20 44.00 - 72.00 %    Lymphocytes 31.30 22.00 - 41.00 %    Monocytes 6.70 0.00 - 13.40 %    Eosinophils 1.20 0.00 - 6.90 %    Basophils 0.30 0.00 - 1.80 %    Immature Granulocytes 0.30 0.00 - 0.90 %    Nucleated RBC 0.00 0.00 - 0.20 /100 WBC    Neutrophils (Absolute) 7.04 1.82 - 7.42 K/uL    Lymphs (Absolute) 3.66 1.00 - 4.80 K/uL    Monos  (Absolute) 0.79 0.00 - 0.85 K/uL    Eos (Absolute) 0.14 0.00 - 0.51 K/uL    Baso (Absolute) 0.04 0.00 - 0.12 K/uL    Immature Granulocytes (abs) 0.04 0.00 - 0.11 K/uL    NRBC (Absolute) 0.00 K/uL   COMP METABOLIC PANEL   Result Value Ref Range    Sodium 137 135 - 145 mmol/L    Potassium 3.9 3.6 - 5.5 mmol/L    Chloride 102 96 - 112 mmol/L    Co2 22 20 - 33 mmol/L    Anion Gap 13.0 7.0 - 16.0    Glucose 89 65 - 99 mg/dL    Bun 14 8 - 22 mg/dL    Creatinine 0.78 0.50 - 1.40 mg/dL    Calcium 9.2 8.5 - 10.5 mg/dL    Correct Calcium 8.6 8.5 - 10.5 mg/dL    AST(SGOT) 20 12 - 45 U/L    ALT(SGPT) 26 2 - 50 U/L    Alkaline Phosphatase 71 30 - 99 U/L    Total Bilirubin 0.6 0.1 - 1.5 mg/dL    Albumin 4.8 3.2 - 4.9 g/dL    Total Protein 7.8 6.0 - 8.2 g/dL    Globulin 3.0 1.9 - 3.5 g/dL    A-G Ratio 1.6 g/dL   PROTHROMBIN TIME   Result Value Ref Range    PT 13.9 12.0 - 14.6 sec    INR 1.06 0.87 - 1.13   APTT   Result Value Ref Range    APTT 26.4 24.7 - 36.0 sec   COD (ADULT)   Result Value Ref Range    ABO Grouping Only O     Rh Grouping Only POS     Antibody Screen-Cod NEG    TROPONIN   Result Value Ref Range    Troponin T 8 6 - 19 ng/L   ESTIMATED GFR   Result Value Ref Range    GFR (CKD-EPI) 109 >60 mL/min/1.73 m 2   ABO Rh Confirm   Result Value Ref Range    ABO Rh Confirm O POS    EKG (NOW)   Result Value Ref Range    Report       [facility] Emergency Dept.    Test Date:  2024-01-10  Pt Name:    ADDIE OHARA   Department: ER  MRN:        [id number]                      Room:       [facility]  Gender:     Male                         Technician: [id number]  :        1975                   Requested By:RASHAD LOPEZ  Order #:    [id number]                    Reading MD: RASHAD LOPEZ MD    Measurements  Intervals                                Axis  Rate:       72                           P:          45  DC:         204                          QRS:        13  QRSD:       151                           T:          50  QT:         415  QTc:        455    Interpretive Statements  Twelve-lead EKG shows a normal sinus rhythm with a ventricular rate of 72,  normal intervals, right bundle branch block that is not acute, no ST segment  elevation or depression, T waves inverted in V1 and V2 which is again is not  acute  Electronically Signed On 01- 12:15:36 PST by RASHAD FORD MD         EKG  I have independently interpreted this EKG    Please see my interpretation above          RADIOLOGY    Radiologist interpretation:   DX-CHEST-PORTABLE (1 VIEW)   Final Result      No acute cardiac or pulmonary abnormalities are identified.      CT-CTA NECK WITH & W/O-POST PROCESSING   Final Result      1.  CT angiogram of the neck within normal limits.   2.  Mildly enlarged RIGHT anterior neck   3.  Prior RIGHT carotid endarterectomy      CT-CTA HEAD WITH & W/O-POST PROCESS   Final Result      CT angiogram of the [place] within normal limits.         CT-CEREBRAL PERFUSION ANALYSIS   Final Result      1.  Cerebral blood flow less than 30% likely representing completed infarct = 0 mL.      2.  T Max more than 6 seconds likely representing combination of completed infarct and ischemia = 0 mL.      3.  Mismatched volume likely representing ischemic brain/penumbra = None      4.  Please note that the cerebral perfusion was performed on the limited brain tissue around the basal ganglia region. Infarct/ischemia outside the CT perfusion sections can be missed in this study.      CT-HEAD W/O   Final Result      1.  No acute intracranial abnormality.   2.  Small area of encephalomalacia in the RIGHT parietal cortex   3.  Mild atrophy   4.  Mild white matter changes                  COURSE & MEDICAL DECISION MAKING    This a 48-year-old gentleman who presents to the emergency department with left-sided paresthesias.  The sensory deficit is the only noted abnormality on arrival with an NIH stroke scale of 1 the  patient was not a candidate for thrombolytics.  He was sent for CT angiogram make sure there is no evidence of a large vessel injury and this was negative.  Does have a history of the right carotid endarterectomy and the artery appears patent at this time.  On repeat examination his paresthesias have completely resolved.  He states he does take aspirin as well as medication for dyslipidemia and hypertension.  The blood work does not show any evidence of metabolic disturbance that could cause his presenting symptoms.    Clinically not see any evidence of an infectious process.  I had a good discussion with neurology and we agreed the patient would benefit from dual antiplatelet therapy and I will prescribe Plavix for the next 3 weeks.  I would like the patient to follow-up with his primary care provider and that period of time.  The patient will return to the emergency department for any further symptoms..    FINAL DIAGNOSIS  Left-sided paresthesias    Disposition  Patient will be discharged home in stable condition and asymptomatic on Plavix as mentioned above.       Electronically signed by: Chuy Curiel M.D., 1/10/2024 10:33 AM

## (undated) DEVICE — MASK ANESTHESIA ADULT  - (100/CA)

## (undated) DEVICE — TUBING CLEARLINK DUO-VENT - C-FLO (48EA/CA)

## (undated) DEVICE — DRAPE LARGE 3 QUARTER - (20/CA)

## (undated) DEVICE — SUTURE GENERAL

## (undated) DEVICE — GOWN WARMING STANDARD FLEX - (30/CA)

## (undated) DEVICE — SUTURE 2-0 30CM STRATAFIX SPIRAL PDO ***WAS PART #SXPD1B401 *****

## (undated) DEVICE — PAD LAP STERILE 18 X 18 - (5/PK 40PK/CA)

## (undated) DEVICE — SHEATH URET ACCESS 10/12FX35 - (6/BX)

## (undated) DEVICE — PACK MINOR BASIN - (2EA/CA)

## (undated) DEVICE — NEPTUNE 4 PORT MANIFOLD - (20/PK)

## (undated) DEVICE — DECANTER FLD BLS - (50/CA)

## (undated) DEVICE — SLEEVE, VASO, THIGH, MED

## (undated) DEVICE — LACTATED RINGERS INJ 1000 ML - (14EA/CA 60CA/PF)

## (undated) DEVICE — GOWN SURGICAL X-LARGE ULTRA - FILM-REINFORCED (20/CA)

## (undated) DEVICE — GLOVE BIOGEL SZ 7 SURGICAL PF LTX - (50PR/BX 4BX/CA)

## (undated) DEVICE — PACK CYSTOSCOPY III - (8/CA)

## (undated) DEVICE — KIT ANESTHESIA W/CIRCUIT & 3/LT BAG W/FILTER (20EA/CA)

## (undated) DEVICE — BAG URODRAIN WITH TUBING - (20/CA)

## (undated) DEVICE — ELECTRODE 850 FOAM ADHESIVE - HYDROGEL RADIOTRNSPRNT (50/PK)

## (undated) DEVICE — SCULPS CEMENT (USE W/BONE - GLUE) 2/PK 6PK/BX

## (undated) DEVICE — ROBOTIC SURGERY SERVICES

## (undated) DEVICE — SUTURE 4-0 MONOCRYL PLUS PS-2 - 27 INCH (36/BX)

## (undated) DEVICE — SUTURE 6-0 PROLENE BV-1 D/A 24 (36PK/BX)"

## (undated) DEVICE — COVER TIP ENDOWRIST HOT SHEAR - (10EA/BX) DA VINCI

## (undated) DEVICE — CATHETER EMBOLECTOMY 3FR (1EA)

## (undated) DEVICE — SEAL 5MM-8MM UNIVERSAL  BOX OF 10

## (undated) DEVICE — DRAPE COLUMN  BOX OF 20

## (undated) DEVICE — CANISTER SUCTION 3000ML MECHANICAL FILTER AUTO SHUTOFF MEDI-VAC NONSTERILE LF DISP  (40EA/CA)

## (undated) DEVICE — Device

## (undated) DEVICE — HEAD HOLDER JUNIOR/ADULT

## (undated) DEVICE — CATHETER URETHRAL OPEN END AXXCESS (10EA/BX)

## (undated) DEVICE — WIPE INSTRUMENT MICROWIPE (20EA/SP)

## (undated) DEVICE — BASKET ZERO 1.9FR X 120CM

## (undated) DEVICE — FORCEPS FENESTRATED BIPOLAR DA VINCI 10X'S REUSABLE

## (undated) DEVICE — CANISTER SUCTION RIGID RED 1500CC (40EA/CA)

## (undated) DEVICE — NEEDLE DRIVER MEGA SUTURECUT DA VINCI 15X'S REUSABLE

## (undated) DEVICE — DRESSING TRANSPARENT FILM TEGADERM 4 X 4.75" (50EA/BX)"

## (undated) DEVICE — SYRINGE SAFETY 10 ML 18 GA X 1 1/2 BLUNT LL (100/BX 4BX/CA)

## (undated) DEVICE — GLOVE BIOGEL PI ORTHO SZ 6 SURGICAL PF LF (40PR/BX)

## (undated) DEVICE — OBTURATOR BLADELESS STANDARD 8MM (6EA/BX)

## (undated) DEVICE — SUTURE CV

## (undated) DEVICE — DRAPE ARM  BOX OF 20

## (undated) DEVICE — WIRE GUIDE SENSOR DUAL FLEX - 5/BX

## (undated) DEVICE — GLOVE BIOGEL PI INDICATOR SZ 6.5 SURGICAL PF LF - (50/BX 4BX/CA)

## (undated) DEVICE — SUCTION INSTRUMENT YANKAUER BULBOUS TIP W/O VENT (50EA/CA)

## (undated) DEVICE — DRAPE IOBAN II INCISE 23X17 - (10EA/BX 4BX/CA)

## (undated) DEVICE — GLOVE BIOGEL INDICATOR SZ 7.5 SURGICAL PF LTX - (50PR/BX 4BX/CA)

## (undated) DEVICE — SUTURE 3-0 VICRYL PLUS SH - 27 INCH (36/BX)

## (undated) DEVICE — SYRINGE 30 ML LL (56/BX)

## (undated) DEVICE — GLOVE BIOGEL INDICATOR SZ 7SURGICAL PF LTX - (50/BX 4BX/CA)

## (undated) DEVICE — DRAPE MAGNETIC (INSTRA-MAG) - (30/CA)

## (undated) DEVICE — CHLORAPREP 26 ML APPLICATOR - ORANGE TINT(25/CA)

## (undated) DEVICE — PROTECTOR ULNA NERVE - (36PR/CA)

## (undated) DEVICE — GLOVE BIOGEL SZ 7.5 SURGICAL PF LTX - (50PR/BX 4BX/CA)

## (undated) DEVICE — SHEARS MONOPOLAR CURVED  DA VINCI 10X'S REUSABLE

## (undated) DEVICE — SPONGE GAUZESTER. 2X2 4-PL - (2/PK 50PK/BX 30BX/CS)

## (undated) DEVICE — TRANSDUCER ADULT DISP. SINGLE BONDED STERILE - (20EA/CA)

## (undated) DEVICE — CLIP SM INTNL HRZN TI ESCP LGT - (24EA/PK 25PK/BX)

## (undated) DEVICE — SYRINGE SAFETY 5 ML 18 GA X 1-1/2 BLUNT LL (100/BX 4BX/CA)

## (undated) DEVICE — NEEDLE NON SAFETY 25 GA X 1 1/2 IN HYPO (100EA/BX)

## (undated) DEVICE — JELLY SURGILUBE STERILE FOIL 5 GM (150EA/BX)

## (undated) DEVICE — SET EXTENSION WITH 2 PORTS (48EA/CA) ***PART #2C8610 IS A SUBSTITUTE*****

## (undated) DEVICE — TOWELS CLOTH SURGICAL - (4/PK 20PK/CA)

## (undated) DEVICE — SET IRRIGATION CYSTOSCOPY Y-TYPE L81 IN (20EA/CA)

## (undated) DEVICE — DERMABOND ADVANCED - (12EA/BX)

## (undated) DEVICE — NEEDLE INSFL 120MM 14GA VRRS - (20/BX)

## (undated) DEVICE — ELECTRODE DUAL RETURN W/ CORD - (50/PK)

## (undated) DEVICE — CATHETER URET DUAL LUMEN

## (undated) DEVICE — SUTURE 3-0 SILK 12 X 18 IN - (36/BX)

## (undated) DEVICE — SENSOR SPO2 NEO LNCS ADHESIVE (20/BX) SEE USER NOTES

## (undated) DEVICE — SUTURE 5-0 PROLENE C-1 D/A 24 (36PK/BX)"

## (undated) DEVICE — DRAIN J-VAC 7MM FLAT - (10EA/CA)

## (undated) DEVICE — BLADE SURGICAL #11 - (50/BX)

## (undated) DEVICE — GLOVE, LITE (PAIR)

## (undated) DEVICE — BLADE SURGICAL #15 - (50/BX 3BX/CA)

## (undated) DEVICE — SODIUM CHL IRRIGATION 0.9% 1000ML (12EA/CA)

## (undated) DEVICE — SODIUM CHL. INJ. 0.9% 500ML (24EA/CA 50CA/PF)

## (undated) DEVICE — CLIP MED INTNL HRZN TI ESCP - (25/BX)

## (undated) DEVICE — GLOVE SZ 6.5 BIOGEL PI MICRO - PF LF (50PR/BX)

## (undated) DEVICE — VESSELOOP MAXI BLUE STERILE- SURG-I-LOOP (10EA/BX)

## (undated) DEVICE — SHEET THYROID - (10EA/CA)

## (undated) DEVICE — SET LEADWIRE 5 LEAD BEDSIDE DISPOSABLE ECG (1SET OF 5/EA)

## (undated) DEVICE — SYSTEM CLEARIFY VISUALIZATION (10EA/PK)

## (undated) DEVICE — BAG SPONGE COUNT 10.25 X 32 - BLUE (250/CA)

## (undated) DEVICE — INSTRUMENT JAWCOVER SUTURE - BOOTS (5PK/BX)

## (undated) DEVICE — SUTURE 4-0 SILK 12 X 18 INCH - (36/BX)

## (undated) DEVICE — TUBE CONNECTING SUCTION - CLEAR PLASTIC STERILE 72 IN (50EA/CA)

## (undated) DEVICE — SUTURE2-0 20CM STRATAFIX SPIRAL PDS CT-1 (12EA/BX)

## (undated) DEVICE — DRAPE 36X28IN RAD CARM BND BG - (25/CA) O

## (undated) DEVICE — BLADE SURGICAL CLIPPER - (50EA/CA)

## (undated) DEVICE — GOWN SURGEONS X-LARGE - DISP. (30/CA)

## (undated) DEVICE — WATER IRRIGATION STERILE 1000ML (12EA/CA)

## (undated) DEVICE — SPONGE GAUZESTER 4 X 4 4PLY - (128PK/CA)

## (undated) DEVICE — HUMID-VENT HEAT AND MOISTURE EXCHANGE- (50/BX)

## (undated) DEVICE — SYRINGE 10 ML CONTROL LL (25EA/BX 4BX/CA)

## (undated) DEVICE — GLOVE BIOGEL SZ 8 SURGICAL PF LTX - (50PR/BX 4BX/CA)

## (undated) DEVICE — SUTURE 2-0 SILK 12 X 18" (36PK/BX)"

## (undated) DEVICE — SET TUBING PNEUMOCLEAR HIGH FLOW SMOKE EVACUATION (10EA/BX)

## (undated) DEVICE — STOPCOCK MALE 4-WAY - (50/CA)

## (undated) DEVICE — DRESSING TRANSPARENT FILM TEGADERM 2.375 X 2.75"  (100EA/BX)"

## (undated) DEVICE — SOD. CHL. INJ. 0.9% 1000 ML - (14EA/CA 60CA/PF)

## (undated) DEVICE — CONTAINER SPECIMEN BAG OR - STERILE 4 OZ W/LID (100EA/CA)

## (undated) DEVICE — POLY UMBILICAL TAPE 1/8X30 - (36/BX)

## (undated) DEVICE — SUTURE 3-0 ETHILON FS-1 - (36/BX) 30 INCH

## (undated) DEVICE — VESSELOOP MINI BLUE STERILE - SURG-I-LOOP (10EA/BX)